# Patient Record
Sex: MALE | Race: BLACK OR AFRICAN AMERICAN | Employment: UNEMPLOYED | URBAN - METROPOLITAN AREA
[De-identification: names, ages, dates, MRNs, and addresses within clinical notes are randomized per-mention and may not be internally consistent; named-entity substitution may affect disease eponyms.]

---

## 2017-01-01 ENCOUNTER — APPOINTMENT (OUTPATIENT)
Dept: GENERAL RADIOLOGY | Age: 62
DRG: 871 | End: 2017-01-01
Attending: HOSPITALIST
Payer: MEDICARE

## 2017-01-01 ENCOUNTER — PATIENT OUTREACH (OUTPATIENT)
Dept: CASE MANAGEMENT | Age: 62
End: 2017-01-01

## 2017-01-01 ENCOUNTER — HOSPITAL ENCOUNTER (INPATIENT)
Age: 62
LOS: 5 days | Discharge: HOME HEALTH | DRG: 871 | End: 2017-07-02
Attending: EMERGENCY MEDICINE | Admitting: INTERNAL MEDICINE
Payer: MEDICARE

## 2017-01-01 ENCOUNTER — APPOINTMENT (OUTPATIENT)
Dept: CT IMAGING | Age: 62
DRG: 872 | End: 2017-01-01
Payer: MEDICARE

## 2017-01-01 ENCOUNTER — APPOINTMENT (OUTPATIENT)
Dept: GENERAL RADIOLOGY | Age: 62
DRG: 872 | End: 2017-01-01
Attending: EMERGENCY MEDICINE
Payer: MEDICARE

## 2017-01-01 ENCOUNTER — APPOINTMENT (OUTPATIENT)
Dept: CT IMAGING | Age: 62
DRG: 394 | End: 2017-01-01
Attending: EMERGENCY MEDICINE
Payer: MEDICARE

## 2017-01-01 ENCOUNTER — APPOINTMENT (OUTPATIENT)
Dept: MRI IMAGING | Age: 62
DRG: 394 | End: 2017-01-01
Payer: MEDICARE

## 2017-01-01 ENCOUNTER — HOSPICE ADMISSION (OUTPATIENT)
Dept: HOSPICE | Facility: HOSPICE | Age: 62
End: 2017-01-01
Payer: MEDICARE

## 2017-01-01 ENCOUNTER — APPOINTMENT (OUTPATIENT)
Dept: GENERAL RADIOLOGY | Age: 62
DRG: 871 | End: 2017-01-01
Attending: PHYSICIAN ASSISTANT
Payer: MEDICARE

## 2017-01-01 ENCOUNTER — APPOINTMENT (OUTPATIENT)
Dept: GENERAL RADIOLOGY | Age: 62
DRG: 871 | End: 2017-01-01
Attending: EMERGENCY MEDICINE
Payer: MEDICARE

## 2017-01-01 ENCOUNTER — APPOINTMENT (OUTPATIENT)
Dept: CT IMAGING | Age: 62
DRG: 871 | End: 2017-01-01
Attending: INTERNAL MEDICINE
Payer: MEDICARE

## 2017-01-01 ENCOUNTER — HOSPITAL ENCOUNTER (INPATIENT)
Age: 62
LOS: 5 days | End: 2017-07-30
Attending: INTERNAL MEDICINE | Admitting: INTERNAL MEDICINE

## 2017-01-01 ENCOUNTER — HOSPITAL ENCOUNTER (OUTPATIENT)
Dept: LAB | Age: 62
Discharge: HOME | End: 2017-06-17

## 2017-01-01 ENCOUNTER — APPOINTMENT (OUTPATIENT)
Dept: GENERAL RADIOLOGY | Age: 62
DRG: 871 | End: 2017-01-01
Attending: INTERNAL MEDICINE
Payer: MEDICARE

## 2017-01-01 ENCOUNTER — HOSPITAL ENCOUNTER (EMERGENCY)
Age: 62
Discharge: HOME | End: 2017-07-02
Attending: EMERGENCY MEDICINE
Payer: MEDICARE

## 2017-01-01 ENCOUNTER — HOSPITAL ENCOUNTER (OUTPATIENT)
Dept: LAB | Age: 62
Discharge: HOME | End: 2017-06-23

## 2017-01-01 ENCOUNTER — HOSPITAL ENCOUNTER (INPATIENT)
Age: 62
LOS: 4 days | Discharge: HOSPICE | DRG: 872 | End: 2017-07-25
Attending: EMERGENCY MEDICINE | Admitting: INTERNAL MEDICINE
Payer: MEDICARE

## 2017-01-01 ENCOUNTER — APPOINTMENT (OUTPATIENT)
Dept: ULTRASOUND IMAGING | Age: 62
DRG: 871 | End: 2017-01-01
Attending: HOSPITALIST
Payer: MEDICARE

## 2017-01-01 ENCOUNTER — HOSPITAL ENCOUNTER (INPATIENT)
Age: 62
LOS: 3 days | Discharge: NURSING, REHAB OR LONG-TERM CARE | DRG: 394 | End: 2017-06-16
Attending: EMERGENCY MEDICINE | Admitting: INTERNAL MEDICINE
Payer: MEDICARE

## 2017-01-01 ENCOUNTER — APPOINTMENT (OUTPATIENT)
Dept: ULTRASOUND IMAGING | Age: 62
DRG: 872 | End: 2017-01-01
Payer: MEDICARE

## 2017-01-01 VITALS
DIASTOLIC BLOOD PRESSURE: 62 MMHG | WEIGHT: 107 LBS | OXYGEN SATURATION: 94 % | HEIGHT: 60 IN | RESPIRATION RATE: 18 BRPM | SYSTOLIC BLOOD PRESSURE: 117 MMHG | HEART RATE: 89 BPM | TEMPERATURE: 99.7 F | BODY MASS INDEX: 21.01 KG/M2

## 2017-01-01 VITALS
DIASTOLIC BLOOD PRESSURE: 67 MMHG | HEIGHT: 60 IN | OXYGEN SATURATION: 98 % | HEART RATE: 80 BPM | WEIGHT: 107 LBS | SYSTOLIC BLOOD PRESSURE: 128 MMHG | BODY MASS INDEX: 21.01 KG/M2 | TEMPERATURE: 99.5 F | RESPIRATION RATE: 16 BRPM

## 2017-01-01 VITALS
OXYGEN SATURATION: 97 % | DIASTOLIC BLOOD PRESSURE: 63 MMHG | RESPIRATION RATE: 18 BRPM | HEIGHT: 68 IN | HEART RATE: 87 BPM | BODY MASS INDEX: 18.94 KG/M2 | SYSTOLIC BLOOD PRESSURE: 103 MMHG | TEMPERATURE: 98 F | WEIGHT: 125 LBS

## 2017-01-01 VITALS
TEMPERATURE: 101 F | OXYGEN SATURATION: 96 % | WEIGHT: 103.1 LBS | HEART RATE: 94 BPM | SYSTOLIC BLOOD PRESSURE: 116 MMHG | BODY MASS INDEX: 43.12 KG/M2 | RESPIRATION RATE: 19 BRPM | DIASTOLIC BLOOD PRESSURE: 57 MMHG

## 2017-01-01 VITALS
RESPIRATION RATE: 16 BRPM | HEART RATE: 93 BPM | DIASTOLIC BLOOD PRESSURE: 52 MMHG | SYSTOLIC BLOOD PRESSURE: 78 MMHG | TEMPERATURE: 96 F

## 2017-01-01 DIAGNOSIS — G93.41 ACUTE METABOLIC ENCEPHALOPATHY: ICD-10-CM

## 2017-01-01 DIAGNOSIS — J18.9 HOSPITAL-ACQUIRED PNEUMONIA: Primary | ICD-10-CM

## 2017-01-01 DIAGNOSIS — I50.22 CHRONIC SYSTOLIC CONGESTIVE HEART FAILURE (HCC): ICD-10-CM

## 2017-01-01 DIAGNOSIS — Z97.8 COMPLAINT ABOUT URINARY CATHETER: Primary | ICD-10-CM

## 2017-01-01 DIAGNOSIS — R53.81 DEBILITY: ICD-10-CM

## 2017-01-01 DIAGNOSIS — I95.9 HYPOTENSION, UNSPECIFIED HYPOTENSION TYPE: ICD-10-CM

## 2017-01-01 DIAGNOSIS — R91.8 PULMONARY INFILTRATES: ICD-10-CM

## 2017-01-01 DIAGNOSIS — F10.10 ETOH ABUSE: Chronic | ICD-10-CM

## 2017-01-01 DIAGNOSIS — F03.90 DEMENTIA WITHOUT BEHAVIORAL DISTURBANCE, UNSPECIFIED DEMENTIA TYPE: Chronic | ICD-10-CM

## 2017-01-01 DIAGNOSIS — Z89.612 S/P AKA (ABOVE KNEE AMPUTATION) BILATERAL (HCC): Chronic | ICD-10-CM

## 2017-01-01 DIAGNOSIS — K61.1 PERIRECTAL ABSCESS: ICD-10-CM
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
## 2017-01-01 DIAGNOSIS — R09.02 HYPOXEMIA: ICD-10-CM

## 2017-01-01 DIAGNOSIS — R39.9 COMPLAINT ABOUT URINARY CATHETER: Primary | ICD-10-CM

## 2017-01-01 DIAGNOSIS — J90 PLEURAL EFFUSION, BILATERAL: ICD-10-CM

## 2017-01-01 DIAGNOSIS — I10 ESSENTIAL HYPERTENSION: Chronic | ICD-10-CM

## 2017-01-01 DIAGNOSIS — K61.1 RECTAL ABSCESS: Primary | ICD-10-CM

## 2017-01-01 DIAGNOSIS — E16.2 HYPOGLYCEMIA: ICD-10-CM

## 2017-01-01 DIAGNOSIS — D64.9 ANEMIA, UNSPECIFIED TYPE: ICD-10-CM

## 2017-01-01 DIAGNOSIS — R79.89 ELEVATED BRAIN NATRIURETIC PEPTIDE (BNP) LEVEL: ICD-10-CM

## 2017-01-01 DIAGNOSIS — N17.9 ACUTE RENAL FAILURE, UNSPECIFIED ACUTE RENAL FAILURE TYPE (HCC): ICD-10-CM

## 2017-01-01 DIAGNOSIS — N17.9 AKI (ACUTE KIDNEY INJURY) (HCC): ICD-10-CM

## 2017-01-01 DIAGNOSIS — N30.01 ACUTE CYSTITIS WITH HEMATURIA: Primary | ICD-10-CM

## 2017-01-01 DIAGNOSIS — Z89.611 S/P AKA (ABOVE KNEE AMPUTATION) BILATERAL (HCC): Chronic | ICD-10-CM

## 2017-01-01 LAB
ABO + RH BLD: NORMAL
ALBUMIN SERPL BCP-MCNC: 1.4 G/DL (ref 3.2–4.6)
ALBUMIN SERPL BCP-MCNC: 1.5 G/DL (ref 3.2–4.6)
ALBUMIN SERPL BCP-MCNC: 1.8 G/DL (ref 3.2–4.6)
ALBUMIN SERPL BCP-MCNC: 2 G/DL (ref 3.2–4.6)
ALBUMIN SERPL BCP-MCNC: 2.1 G/DL (ref 3.2–4.6)
ALBUMIN SERPL BCP-MCNC: 2.4 G/DL (ref 3.2–4.6)
ALBUMIN SERPL BCP-MCNC: 2.6 G/DL (ref 3.2–4.6)
ALBUMIN/GLOB SERPL: 0.3 {RATIO} (ref 1.2–3.5)
ALBUMIN/GLOB SERPL: 0.4 {RATIO} (ref 1.2–3.5)
ALBUMIN/GLOB SERPL: 0.5 {RATIO} (ref 1.2–3.5)
ALBUMIN/GLOB SERPL: 0.5 {RATIO} (ref 1.2–3.5)
ALP SERPL-CCNC: 106 U/L (ref 50–136)
ALP SERPL-CCNC: 115 U/L (ref 50–136)
ALP SERPL-CCNC: 117 U/L (ref 50–136)
ALP SERPL-CCNC: 120 U/L (ref 50–136)
ALP SERPL-CCNC: 149 U/L (ref 50–136)
ALP SERPL-CCNC: 181 U/L (ref 50–136)
ALP SERPL-CCNC: 94 U/L (ref 50–136)
ALT SERPL-CCNC: 10 U/L (ref 12–65)
ALT SERPL-CCNC: 110 U/L (ref 12–65)
ALT SERPL-CCNC: 12 U/L (ref 12–65)
ALT SERPL-CCNC: 15 U/L (ref 12–65)
ALT SERPL-CCNC: 183 U/L (ref 12–65)
AMMONIA PLAS-SCNC: 22 UMOL/L (ref 11–32)
AMPHET UR QL SCN: NEGATIVE
ANION GAP BLD CALC-SCNC: 10 MMOL/L (ref 7–16)
ANION GAP BLD CALC-SCNC: 11 MMOL/L (ref 7–16)
ANION GAP BLD CALC-SCNC: 12 MMOL/L (ref 7–16)
ANION GAP BLD CALC-SCNC: 13 MMOL/L (ref 7–16)
ANION GAP BLD CALC-SCNC: 6 MMOL/L (ref 7–16)
ANION GAP BLD CALC-SCNC: 7 MMOL/L (ref 7–16)
ANION GAP BLD CALC-SCNC: 7 MMOL/L (ref 7–16)
ANION GAP BLD CALC-SCNC: 8 MMOL/L (ref 7–16)
ANION GAP BLD CALC-SCNC: 9 MMOL/L (ref 7–16)
ANION GAP BLD CALC-SCNC: 9 MMOL/L (ref 7–16)
APPEARANCE UR: ABNORMAL
APPEARANCE UR: ABNORMAL
APPEARANCE UR: CLEAR
ARTERIAL PATENCY WRIST A: POSITIVE
AST SERPL W P-5'-P-CCNC: 153 U/L (ref 15–37)
AST SERPL W P-5'-P-CCNC: 19 U/L (ref 15–37)
AST SERPL W P-5'-P-CCNC: 23 U/L (ref 15–37)
AST SERPL W P-5'-P-CCNC: 24 U/L (ref 15–37)
AST SERPL W P-5'-P-CCNC: 50 U/L (ref 15–37)
AST SERPL W P-5'-P-CCNC: 79 U/L (ref 15–37)
AST SERPL W P-5'-P-CCNC: 9 U/L (ref 15–37)
ATRIAL RATE: 86 BPM
BACTERIA SPEC CULT: ABNORMAL
BACTERIA SPEC CULT: NORMAL
BACTERIA URNS QL MICRO: ABNORMAL /HPF
BARBITURATES UR QL SCN: NEGATIVE
BASE DEFICIT BLDA-SCNC: 6.7 MMOL/L (ref 0–2)
BASOPHILS # BLD AUTO: 0 K/UL (ref 0–0.2)
BASOPHILS # BLD: 0 % (ref 0–2)
BASOPHILS # BLD: 1 % (ref 0–2)
BDY SITE: ABNORMAL
BENZODIAZ UR QL: NEGATIVE
BILIRUB DIRECT SERPL-MCNC: <0.1 MG/DL
BILIRUB SERPL-MCNC: 0.1 MG/DL (ref 0.2–1.1)
BILIRUB SERPL-MCNC: 0.2 MG/DL (ref 0.2–1.1)
BILIRUB SERPL-MCNC: 0.3 MG/DL (ref 0.2–1.1)
BILIRUB SERPL-MCNC: 0.3 MG/DL (ref 0.2–1.1)
BILIRUB SERPL-MCNC: 0.4 MG/DL (ref 0.2–1.1)
BILIRUB UR QL: ABNORMAL
BILIRUB UR QL: NEGATIVE
BILIRUB UR QL: NEGATIVE
BLD PROD TYP BPU: NORMAL
BLD PROD TYP BPU: NORMAL
BLOOD GROUP ANTIBODIES SERPL: NORMAL
BNP SERPL-MCNC: 1125 PG/ML
BNP SERPL-MCNC: 2285 PG/ML
BNP SERPL-MCNC: 884 PG/ML
BPU ID: NORMAL
BPU ID: NORMAL
BUN SERPL-MCNC: 10 MG/DL (ref 8–23)
BUN SERPL-MCNC: 17 MG/DL (ref 8–23)
BUN SERPL-MCNC: 19 MG/DL (ref 8–23)
BUN SERPL-MCNC: 20 MG/DL (ref 8–23)
BUN SERPL-MCNC: 21 MG/DL (ref 8–23)
BUN SERPL-MCNC: 23 MG/DL (ref 8–23)
BUN SERPL-MCNC: 23 MG/DL (ref 8–23)
BUN SERPL-MCNC: 25 MG/DL (ref 8–23)
BUN SERPL-MCNC: 27 MG/DL (ref 8–23)
BUN SERPL-MCNC: 31 MG/DL (ref 8–23)
BUN SERPL-MCNC: 33 MG/DL (ref 8–23)
BUN SERPL-MCNC: 37 MG/DL (ref 8–23)
BUN SERPL-MCNC: 39 MG/DL (ref 8–23)
BUN SERPL-MCNC: 45 MG/DL (ref 8–23)
BUN SERPL-MCNC: 51 MG/DL (ref 8–23)
BUN SERPL-MCNC: 56 MG/DL (ref 8–23)
BUN SERPL-MCNC: 58 MG/DL (ref 8–23)
BUN SERPL-MCNC: 60 MG/DL (ref 8–23)
C TRACH RRNA SPEC QL NAA+PROBE: NEGATIVE
CA-I BLD-SCNC: 1.11 MMOL/L (ref 1–1.3)
CALCIUM SERPL-MCNC: 7.2 MG/DL (ref 8.3–10.4)
CALCIUM SERPL-MCNC: 7.6 MG/DL (ref 8.3–10.4)
CALCIUM SERPL-MCNC: 7.7 MG/DL (ref 8.3–10.4)
CALCIUM SERPL-MCNC: 7.8 MG/DL (ref 8.3–10.4)
CALCIUM SERPL-MCNC: 7.9 MG/DL (ref 8.3–10.4)
CALCIUM SERPL-MCNC: 7.9 MG/DL (ref 8.3–10.4)
CALCIUM SERPL-MCNC: 8 MG/DL (ref 8.3–10.4)
CALCIUM SERPL-MCNC: 8.1 MG/DL (ref 8.3–10.4)
CALCIUM SERPL-MCNC: 8.1 MG/DL (ref 8.3–10.4)
CALCIUM SERPL-MCNC: 8.2 MG/DL (ref 8.3–10.4)
CALCIUM SERPL-MCNC: 8.5 MG/DL (ref 8.3–10.4)
CALCIUM SERPL-MCNC: 8.6 MG/DL (ref 8.3–10.4)
CALCIUM SERPL-MCNC: 8.7 MG/DL (ref 8.3–10.4)
CALCIUM SERPL-MCNC: 9 MG/DL (ref 8.3–10.4)
CALCIUM SERPL-MCNC: 9 MG/DL (ref 8.3–10.4)
CALCIUM SERPL-MCNC: 9.2 MG/DL (ref 8.3–10.4)
CALCULATED P AXIS, ECG09: 48 DEGREES
CALCULATED R AXIS, ECG10: 12 DEGREES
CALCULATED T AXIS, ECG11: -77 DEGREES
CANNABINOIDS UR QL SCN: NEGATIVE
CASTS URNS QL MICRO: 0 /LPF
CASTS URNS QL MICRO: 0 /LPF
CHLORIDE BLDA-SCNC: 99 MMOL/L (ref 98–106)
CHLORIDE SERPL-SCNC: 100 MMOL/L (ref 98–107)
CHLORIDE SERPL-SCNC: 102 MMOL/L (ref 98–107)
CHLORIDE SERPL-SCNC: 102 MMOL/L (ref 98–107)
CHLORIDE SERPL-SCNC: 103 MMOL/L (ref 98–107)
CHLORIDE SERPL-SCNC: 90 MMOL/L (ref 98–107)
CHLORIDE SERPL-SCNC: 94 MMOL/L (ref 98–107)
CHLORIDE SERPL-SCNC: 95 MMOL/L (ref 98–107)
CHLORIDE SERPL-SCNC: 95 MMOL/L (ref 98–107)
CHLORIDE SERPL-SCNC: 96 MMOL/L (ref 98–107)
CHLORIDE SERPL-SCNC: 98 MMOL/L (ref 98–107)
CHLORIDE SERPL-SCNC: 99 MMOL/L (ref 98–107)
CO2 SERPL-SCNC: 19 MMOL/L (ref 21–32)
CO2 SERPL-SCNC: 19 MMOL/L (ref 21–32)
CO2 SERPL-SCNC: 21 MMOL/L (ref 21–32)
CO2 SERPL-SCNC: 22 MMOL/L (ref 21–32)
CO2 SERPL-SCNC: 23 MMOL/L (ref 21–32)
CO2 SERPL-SCNC: 23 MMOL/L (ref 21–32)
CO2 SERPL-SCNC: 24 MMOL/L (ref 21–32)
CO2 SERPL-SCNC: 25 MMOL/L (ref 21–32)
CO2 SERPL-SCNC: 25 MMOL/L (ref 21–32)
CO2 SERPL-SCNC: 26 MMOL/L (ref 21–32)
CO2 SERPL-SCNC: 27 MMOL/L (ref 21–32)
CO2 SERPL-SCNC: 28 MMOL/L (ref 21–32)
CO2 SERPL-SCNC: 30 MMOL/L (ref 21–32)
CO2 SERPL-SCNC: 32 MMOL/L (ref 21–32)
CO2 SERPL-SCNC: 34 MMOL/L (ref 21–32)
COCAINE UR QL SCN: NEGATIVE
COHGB MFR BLD: 0.8 % (ref 0.5–1.5)
COLOR UR: YELLOW
CREAT SERPL-MCNC: 0.55 MG/DL (ref 0.8–1.5)
CREAT SERPL-MCNC: 0.62 MG/DL (ref 0.8–1.5)
CREAT SERPL-MCNC: 0.63 MG/DL (ref 0.8–1.5)
CREAT SERPL-MCNC: 0.65 MG/DL (ref 0.8–1.5)
CREAT SERPL-MCNC: 0.66 MG/DL (ref 0.8–1.5)
CREAT SERPL-MCNC: 0.66 MG/DL (ref 0.8–1.5)
CREAT SERPL-MCNC: 0.71 MG/DL (ref 0.8–1.5)
CREAT SERPL-MCNC: 0.72 MG/DL (ref 0.8–1.5)
CREAT SERPL-MCNC: 0.8 MG/DL (ref 0.8–1.5)
CREAT SERPL-MCNC: 0.81 MG/DL (ref 0.8–1.5)
CREAT SERPL-MCNC: 0.87 MG/DL (ref 0.8–1.5)
CREAT SERPL-MCNC: 0.92 MG/DL (ref 0.8–1.5)
CREAT SERPL-MCNC: 0.96 MG/DL (ref 0.8–1.5)
CREAT SERPL-MCNC: 0.98 MG/DL (ref 0.8–1.5)
CREAT SERPL-MCNC: 1.01 MG/DL (ref 0.8–1.5)
CREAT SERPL-MCNC: 1.19 MG/DL (ref 0.8–1.5)
CREAT SERPL-MCNC: 1.19 MG/DL (ref 0.8–1.5)
CREAT SERPL-MCNC: 1.2 MG/DL (ref 0.8–1.5)
CROSSMATCH RESULT,%XM: NORMAL
CROSSMATCH RESULT,%XM: NORMAL
CRYSTALS URNS QL MICRO: 0 /LPF
CRYSTALS URNS QL MICRO: 0 /LPF
DIAGNOSIS, 93000: NORMAL
DIFFERENTIAL METHOD BLD: ABNORMAL
DO-HGB BLD-MCNC: 7 % (ref 0–5)
EOSINOPHIL # BLD: 0 K/UL (ref 0–0.8)
EOSINOPHIL # BLD: 0.1 K/UL (ref 0–0.8)
EOSINOPHIL # BLD: 0.1 K/UL (ref 0–0.8)
EOSINOPHIL # BLD: 0.2 K/UL (ref 0–0.8)
EOSINOPHIL NFR BLD: 0 % (ref 0.5–7.8)
EOSINOPHIL NFR BLD: 0 % (ref 0.5–7.8)
EOSINOPHIL NFR BLD: 1 % (ref 0.5–7.8)
EOSINOPHIL NFR BLD: 2 % (ref 0.5–7.8)
EOSINOPHIL NFR BLD: 3 % (ref 0.5–7.8)
EPI CELLS #/AREA URNS HPF: 0 /HPF
EPI CELLS #/AREA URNS HPF: ABNORMAL /HPF
ERYTHROCYTE [DISTWIDTH] IN BLOOD BY AUTOMATED COUNT: 18 % (ref 11.9–14.6)
ERYTHROCYTE [DISTWIDTH] IN BLOOD BY AUTOMATED COUNT: 18.1 % (ref 11.9–14.6)
ERYTHROCYTE [DISTWIDTH] IN BLOOD BY AUTOMATED COUNT: 18.1 % (ref 11.9–14.6)
ERYTHROCYTE [DISTWIDTH] IN BLOOD BY AUTOMATED COUNT: 18.5 % (ref 11.9–14.6)
ERYTHROCYTE [DISTWIDTH] IN BLOOD BY AUTOMATED COUNT: 18.5 % (ref 11.9–14.6)
ERYTHROCYTE [DISTWIDTH] IN BLOOD BY AUTOMATED COUNT: 18.6 % (ref 11.9–14.6)
ERYTHROCYTE [DISTWIDTH] IN BLOOD BY AUTOMATED COUNT: 18.7 % (ref 11.9–14.6)
ERYTHROCYTE [DISTWIDTH] IN BLOOD BY AUTOMATED COUNT: 18.8 % (ref 11.9–14.6)
ERYTHROCYTE [DISTWIDTH] IN BLOOD BY AUTOMATED COUNT: 19.7 % (ref 11.9–14.6)
ERYTHROCYTE [DISTWIDTH] IN BLOOD BY AUTOMATED COUNT: 20.5 % (ref 11.9–14.6)
ERYTHROCYTE [DISTWIDTH] IN BLOOD BY AUTOMATED COUNT: 21.2 % (ref 11.9–14.6)
ERYTHROCYTE [DISTWIDTH] IN BLOOD BY AUTOMATED COUNT: 21.4 % (ref 11.9–14.6)
EST. AVERAGE GLUCOSE BLD GHB EST-MCNC: 289 MG/DL
EST. AVERAGE GLUCOSE BLD GHB EST-MCNC: 292 MG/DL
GAS FLOW.O2 O2 DELIVERY SYS: 4 L/MIN
GLOBULIN SER CALC-MCNC: 4.9 G/DL (ref 2.3–3.5)
GLOBULIN SER CALC-MCNC: 5 G/DL (ref 2.3–3.5)
GLOBULIN SER CALC-MCNC: 5.4 G/DL (ref 2.3–3.5)
GLOBULIN SER CALC-MCNC: 5.5 G/DL (ref 2.3–3.5)
GLOBULIN SER CALC-MCNC: 5.5 G/DL (ref 2.3–3.5)
GLOBULIN SER CALC-MCNC: 6.1 G/DL (ref 2.3–3.5)
GLOBULIN SER CALC-MCNC: 6.2 G/DL (ref 2.3–3.5)
GLUCOSE BLD STRIP.AUTO-MCNC: 100 MG/DL (ref 65–100)
GLUCOSE BLD STRIP.AUTO-MCNC: 101 MG/DL (ref 65–100)
GLUCOSE BLD STRIP.AUTO-MCNC: 104 MG/DL (ref 65–100)
GLUCOSE BLD STRIP.AUTO-MCNC: 107 MG/DL (ref 65–100)
GLUCOSE BLD STRIP.AUTO-MCNC: 107 MG/DL (ref 65–100)
GLUCOSE BLD STRIP.AUTO-MCNC: 108 MG/DL (ref 65–100)
GLUCOSE BLD STRIP.AUTO-MCNC: 109 MG/DL (ref 65–100)
GLUCOSE BLD STRIP.AUTO-MCNC: 109 MG/DL (ref 65–100)
GLUCOSE BLD STRIP.AUTO-MCNC: 119 MG/DL (ref 65–100)
GLUCOSE BLD STRIP.AUTO-MCNC: 120 MG/DL (ref 65–100)
GLUCOSE BLD STRIP.AUTO-MCNC: 128 MG/DL (ref 65–100)
GLUCOSE BLD STRIP.AUTO-MCNC: 130 MG/DL (ref 65–100)
GLUCOSE BLD STRIP.AUTO-MCNC: 135 MG/DL (ref 65–100)
GLUCOSE BLD STRIP.AUTO-MCNC: 135 MG/DL (ref 65–100)
GLUCOSE BLD STRIP.AUTO-MCNC: 137 MG/DL (ref 65–100)
GLUCOSE BLD STRIP.AUTO-MCNC: 141 MG/DL (ref 65–100)
GLUCOSE BLD STRIP.AUTO-MCNC: 146 MG/DL (ref 65–100)
GLUCOSE BLD STRIP.AUTO-MCNC: 148 MG/DL (ref 65–100)
GLUCOSE BLD STRIP.AUTO-MCNC: 163 MG/DL (ref 65–100)
GLUCOSE BLD STRIP.AUTO-MCNC: 172 MG/DL (ref 65–100)
GLUCOSE BLD STRIP.AUTO-MCNC: 173 MG/DL (ref 65–100)
GLUCOSE BLD STRIP.AUTO-MCNC: 175 MG/DL (ref 65–100)
GLUCOSE BLD STRIP.AUTO-MCNC: 184 MG/DL (ref 65–100)
GLUCOSE BLD STRIP.AUTO-MCNC: 185 MG/DL (ref 65–100)
GLUCOSE BLD STRIP.AUTO-MCNC: 189 MG/DL (ref 65–100)
GLUCOSE BLD STRIP.AUTO-MCNC: 19 MG/DL (ref 65–100)
GLUCOSE BLD STRIP.AUTO-MCNC: 196 MG/DL (ref 65–100)
GLUCOSE BLD STRIP.AUTO-MCNC: 199 MG/DL (ref 65–100)
GLUCOSE BLD STRIP.AUTO-MCNC: 208 MG/DL (ref 65–100)
GLUCOSE BLD STRIP.AUTO-MCNC: 210 MG/DL (ref 65–100)
GLUCOSE BLD STRIP.AUTO-MCNC: 213 MG/DL (ref 65–100)
GLUCOSE BLD STRIP.AUTO-MCNC: 219 MG/DL (ref 65–100)
GLUCOSE BLD STRIP.AUTO-MCNC: 221 MG/DL (ref 65–100)
GLUCOSE BLD STRIP.AUTO-MCNC: 224 MG/DL (ref 65–100)
GLUCOSE BLD STRIP.AUTO-MCNC: 245 MG/DL (ref 65–100)
GLUCOSE BLD STRIP.AUTO-MCNC: 247 MG/DL (ref 65–100)
GLUCOSE BLD STRIP.AUTO-MCNC: 250 MG/DL (ref 65–100)
GLUCOSE BLD STRIP.AUTO-MCNC: 254 MG/DL (ref 65–100)
GLUCOSE BLD STRIP.AUTO-MCNC: 260 MG/DL (ref 65–100)
GLUCOSE BLD STRIP.AUTO-MCNC: 265 MG/DL (ref 65–100)
GLUCOSE BLD STRIP.AUTO-MCNC: 269 MG/DL (ref 65–100)
GLUCOSE BLD STRIP.AUTO-MCNC: 27 MG/DL (ref 65–100)
GLUCOSE BLD STRIP.AUTO-MCNC: 276 MG/DL (ref 65–100)
GLUCOSE BLD STRIP.AUTO-MCNC: 311 MG/DL (ref 65–100)
GLUCOSE BLD STRIP.AUTO-MCNC: 322 MG/DL (ref 65–100)
GLUCOSE BLD STRIP.AUTO-MCNC: 324 MG/DL (ref 65–100)
GLUCOSE BLD STRIP.AUTO-MCNC: 324 MG/DL (ref 65–100)
GLUCOSE BLD STRIP.AUTO-MCNC: 334 MG/DL (ref 65–100)
GLUCOSE BLD STRIP.AUTO-MCNC: 345 MG/DL (ref 65–100)
GLUCOSE BLD STRIP.AUTO-MCNC: 359 MG/DL (ref 65–100)
GLUCOSE BLD STRIP.AUTO-MCNC: 374 MG/DL (ref 65–100)
GLUCOSE BLD STRIP.AUTO-MCNC: 384 MG/DL (ref 65–100)
GLUCOSE BLD STRIP.AUTO-MCNC: 393 MG/DL (ref 65–100)
GLUCOSE BLD STRIP.AUTO-MCNC: 45 MG/DL (ref 65–100)
GLUCOSE BLD STRIP.AUTO-MCNC: 46 MG/DL (ref 65–100)
GLUCOSE BLD STRIP.AUTO-MCNC: 65 MG/DL (ref 65–100)
GLUCOSE BLD STRIP.AUTO-MCNC: 79 MG/DL (ref 65–100)
GLUCOSE BLD STRIP.AUTO-MCNC: 79 MG/DL (ref 65–100)
GLUCOSE BLD STRIP.AUTO-MCNC: 94 MG/DL (ref 65–100)
GLUCOSE BLD STRIP.AUTO-MCNC: 95 MG/DL (ref 65–100)
GLUCOSE BLD STRIP.AUTO-MCNC: 97 MG/DL (ref 65–100)
GLUCOSE BLD STRIP.AUTO-MCNC: 98 MG/DL (ref 65–100)
GLUCOSE BLD STRIP.AUTO-MCNC: >600 MG/DL (ref 65–100)
GLUCOSE SERPL-MCNC: 103 MG/DL (ref 65–100)
GLUCOSE SERPL-MCNC: 123 MG/DL (ref 65–100)
GLUCOSE SERPL-MCNC: 132 MG/DL (ref 65–100)
GLUCOSE SERPL-MCNC: 146 MG/DL (ref 65–100)
GLUCOSE SERPL-MCNC: 154 MG/DL (ref 65–100)
GLUCOSE SERPL-MCNC: 173 MG/DL (ref 65–100)
GLUCOSE SERPL-MCNC: 195 MG/DL (ref 65–100)
GLUCOSE SERPL-MCNC: 241 MG/DL (ref 65–100)
GLUCOSE SERPL-MCNC: 244 MG/DL (ref 65–100)
GLUCOSE SERPL-MCNC: 336 MG/DL (ref 65–100)
GLUCOSE SERPL-MCNC: 336 MG/DL (ref 65–100)
GLUCOSE SERPL-MCNC: 359 MG/DL (ref 65–100)
GLUCOSE SERPL-MCNC: 38 MG/DL (ref 65–100)
GLUCOSE SERPL-MCNC: 45 MG/DL (ref 65–100)
GLUCOSE SERPL-MCNC: 596 MG/DL (ref 65–100)
GLUCOSE SERPL-MCNC: 644 MG/DL (ref 65–100)
GLUCOSE SERPL-MCNC: 76 MG/DL (ref 65–100)
GLUCOSE SERPL-MCNC: 96 MG/DL (ref 65–100)
GLUCOSE SERPL-MCNC: 97 MG/DL (ref 65–100)
GLUCOSE UR STRIP.AUTO-MCNC: >1000 MG/DL
GLUCOSE UR STRIP.AUTO-MCNC: >1000 MG/DL
GLUCOSE UR STRIP.AUTO-MCNC: NEGATIVE MG/DL
GRAM STN SPEC: ABNORMAL
HAV IGM SERPL QL IA: NEGATIVE
HBA1C MFR BLD: 11.7 % (ref 4.8–6)
HBA1C MFR BLD: 11.8 % (ref 4.8–6)
HBV CORE IGM SERPL QL IA: NEGATIVE
HBV SURFACE AG SERPL QL IA: NEGATIVE
HCO3 BLDA-SCNC: 18 MMOL/L (ref 22–26)
HCT VFR BLD AUTO: 17.1 % (ref 41.1–50.3)
HCT VFR BLD AUTO: 21.2 % (ref 41.1–50.3)
HCT VFR BLD AUTO: 22.6 % (ref 41.1–50.3)
HCT VFR BLD AUTO: 25.3 % (ref 41.1–50.3)
HCT VFR BLD AUTO: 25.3 % (ref 41.1–50.3)
HCT VFR BLD AUTO: 25.5 % (ref 41.1–50.3)
HCT VFR BLD AUTO: 25.5 % (ref 41.1–50.3)
HCT VFR BLD AUTO: 26.4 % (ref 41.1–50.3)
HCT VFR BLD AUTO: 26.4 % (ref 41.1–50.3)
HCT VFR BLD AUTO: 26.6 % (ref 41.1–50.3)
HCT VFR BLD AUTO: 27.1 % (ref 41.1–50.3)
HCT VFR BLD AUTO: 27.2 % (ref 41.1–50.3)
HCT VFR BLD AUTO: 27.4 % (ref 41.1–50.3)
HCT VFR BLD AUTO: 27.7 % (ref 41.1–50.3)
HCT VFR BLD AUTO: 28.1 % (ref 41.1–50.3)
HCT VFR BLD AUTO: 28.9 % (ref 41.1–50.3)
HCT VFR BLD AUTO: 29.9 % (ref 41.1–50.3)
HCT VFR BLD AUTO: 30.5 % (ref 41.1–50.3)
HCT VFR BLD AUTO: 32.1 % (ref 41.1–50.3)
HCT VFR BLD AUTO: 32.4 % (ref 41.1–50.3)
HCT VFR BLD AUTO: 33.4 % (ref 41.1–50.3)
HCT VFR BLD AUTO: 33.9 % (ref 41.1–50.3)
HCV AB S/CO SERPL IA: <0.1 S/CO RATIO (ref 0–0.9)
HEMOCCULT STL QL: POSITIVE
HGB BLD-MCNC: 10.9 G/DL (ref 13.6–17.2)
HGB BLD-MCNC: 11.1 G/DL (ref 13.6–17.2)
HGB BLD-MCNC: 11.1 G/DL (ref 13.6–17.2)
HGB BLD-MCNC: 11.6 G/DL (ref 13.6–17.2)
HGB BLD-MCNC: 5.7 G/DL (ref 13.6–17.2)
HGB BLD-MCNC: 7 G/DL (ref 13.6–17.2)
HGB BLD-MCNC: 7.6 G/DL (ref 13.6–17.2)
HGB BLD-MCNC: 8.6 G/DL (ref 13.6–17.2)
HGB BLD-MCNC: 8.7 G/DL (ref 13.6–17.2)
HGB BLD-MCNC: 8.8 G/DL (ref 13.6–17.2)
HGB BLD-MCNC: 8.9 G/DL (ref 13.6–17.2)
HGB BLD-MCNC: 9 G/DL (ref 13.6–17.2)
HGB BLD-MCNC: 9.1 G/DL (ref 13.6–17.2)
HGB BLD-MCNC: 9.1 G/DL (ref 13.6–17.2)
HGB BLD-MCNC: 9.2 G/DL (ref 13.6–17.2)
HGB BLD-MCNC: 9.3 G/DL (ref 13.6–17.2)
HGB BLD-MCNC: 9.5 G/DL (ref 13.6–17.2)
HGB BLD-MCNC: 9.8 G/DL (ref 13.6–17.2)
HGB BLD-MCNC: 9.9 G/DL (ref 13.6–17.2)
HGB BLD-MCNC: 9.9 G/DL (ref 13.6–17.2)
HGB BLDMV-MCNC: 6.4 GM/DL (ref 11.7–15)
HGB UR QL STRIP: ABNORMAL
HIV1 P24 AG SERPL QL IA: NONREACTIVE
HIV1+2 AB SERPL QL IA: NONREACTIVE
IMM GRANULOCYTES # BLD: 0 K/UL (ref 0–0.5)
IMM GRANULOCYTES # BLD: 0.1 K/UL (ref 0–0.5)
IMM GRANULOCYTES NFR BLD AUTO: 0.3 % (ref 0–5)
IMM GRANULOCYTES NFR BLD AUTO: 0.4 % (ref 0–5)
IMM GRANULOCYTES NFR BLD AUTO: 0.4 % (ref 0–5)
IMM GRANULOCYTES NFR BLD AUTO: 0.5 % (ref 0–5)
IMM GRANULOCYTES NFR BLD AUTO: 0.6 % (ref 0–5)
IMM GRANULOCYTES NFR BLD AUTO: 0.6 % (ref 0–5)
IMM GRANULOCYTES NFR BLD AUTO: 0.8 % (ref 0–5)
INR PPP: 1 (ref 0.9–1.2)
KETONES UR QL STRIP.AUTO: ABNORMAL MG/DL
KETONES UR QL STRIP.AUTO: NEGATIVE MG/DL
KETONES UR QL STRIP.AUTO: NEGATIVE MG/DL
LACOSAMIDE SERPL-MCNC: 11 UG/ML (ref 5–10)
LACOSAMIDE SERPL-MCNC: 9.6 UG/ML (ref 5–10)
LACTATE BLD-SCNC: 0.8 MMOL/L (ref 0.5–1.9)
LACTATE BLD-SCNC: 1.7 MMOL/L (ref 0.5–1.9)
LACTATE SERPL-SCNC: 1.4 MMOL/L (ref 0.4–2)
LACTATE SERPL-SCNC: 1.6 MMOL/L (ref 0.4–2)
LEUKOCYTE ESTERASE UR QL STRIP.AUTO: ABNORMAL
LEVETIRACETAM SERPL-MCNC: 42.3 UG/ML (ref 10–40)
LEVETIRACETAM SERPL-MCNC: 46.1 UG/ML (ref 10–40)
LEVETIRACETAM SERPL-MCNC: NORMAL UG/ML (ref 10–40)
LIPASE SERPL-CCNC: 164 U/L (ref 73–393)
LYMPHOCYTES # BLD AUTO: 13 % (ref 13–44)
LYMPHOCYTES # BLD AUTO: 14 % (ref 13–44)
LYMPHOCYTES # BLD AUTO: 16 % (ref 13–44)
LYMPHOCYTES # BLD AUTO: 17 % (ref 13–44)
LYMPHOCYTES # BLD AUTO: 19 % (ref 13–44)
LYMPHOCYTES # BLD AUTO: 22 % (ref 13–44)
LYMPHOCYTES # BLD AUTO: 23 % (ref 13–44)
LYMPHOCYTES # BLD AUTO: 38 % (ref 13–44)
LYMPHOCYTES # BLD AUTO: 46 % (ref 13–44)
LYMPHOCYTES # BLD AUTO: 50 % (ref 13–44)
LYMPHOCYTES # BLD: 1.5 K/UL (ref 0.5–4.6)
LYMPHOCYTES # BLD: 1.5 K/UL (ref 0.5–4.6)
LYMPHOCYTES # BLD: 2.1 K/UL (ref 0.5–4.6)
LYMPHOCYTES # BLD: 2.6 K/UL (ref 0.5–4.6)
LYMPHOCYTES # BLD: 2.7 K/UL (ref 0.5–4.6)
LYMPHOCYTES # BLD: 3.3 K/UL (ref 0.5–4.6)
LYMPHOCYTES # BLD: 3.5 K/UL (ref 0.5–4.6)
LYMPHOCYTES # BLD: 3.5 K/UL (ref 0.5–4.6)
LYMPHOCYTES # BLD: 5.1 K/UL (ref 0.5–4.6)
LYMPHOCYTES # BLD: 6 K/UL (ref 0.5–4.6)
MAGNESIUM SERPL-MCNC: 2 MG/DL (ref 1.8–2.4)
MAGNESIUM SERPL-MCNC: 2 MG/DL (ref 1.8–2.4)
MAGNESIUM SERPL-MCNC: 2.1 MG/DL (ref 1.8–2.4)
MAGNESIUM SERPL-MCNC: 2.1 MG/DL (ref 1.8–2.4)
MAGNESIUM SERPL-MCNC: 2.3 MG/DL (ref 1.8–2.4)
MCH RBC QN AUTO: 26.8 PG (ref 26.1–32.9)
MCH RBC QN AUTO: 26.9 PG (ref 26.1–32.9)
MCH RBC QN AUTO: 27 PG (ref 26.1–32.9)
MCH RBC QN AUTO: 27.1 PG (ref 26.1–32.9)
MCH RBC QN AUTO: 27.2 PG (ref 26.1–32.9)
MCH RBC QN AUTO: 27.3 PG (ref 26.1–32.9)
MCH RBC QN AUTO: 27.5 PG (ref 26.1–32.9)
MCH RBC QN AUTO: 27.6 PG (ref 26.1–32.9)
MCH RBC QN AUTO: 27.6 PG (ref 26.1–32.9)
MCH RBC QN AUTO: 28.3 PG (ref 26.1–32.9)
MCHC RBC AUTO-ENTMCNC: 32.5 G/DL (ref 31.4–35)
MCHC RBC AUTO-ENTMCNC: 32.8 G/DL (ref 31.4–35)
MCHC RBC AUTO-ENTMCNC: 33.2 G/DL (ref 31.4–35)
MCHC RBC AUTO-ENTMCNC: 33.3 G/DL (ref 31.4–35)
MCHC RBC AUTO-ENTMCNC: 33.5 G/DL (ref 31.4–35)
MCHC RBC AUTO-ENTMCNC: 33.6 G/DL (ref 31.4–35)
MCHC RBC AUTO-ENTMCNC: 34 G/DL (ref 31.4–35)
MCHC RBC AUTO-ENTMCNC: 34.2 G/DL (ref 31.4–35)
MCHC RBC AUTO-ENTMCNC: 34.3 G/DL (ref 31.4–35)
MCHC RBC AUTO-ENTMCNC: 34.4 G/DL (ref 31.4–35)
MCV RBC AUTO: 79.4 FL (ref 79.6–97.8)
MCV RBC AUTO: 79.8 FL (ref 79.6–97.8)
MCV RBC AUTO: 79.9 FL (ref 79.6–97.8)
MCV RBC AUTO: 81.3 FL (ref 79.6–97.8)
MCV RBC AUTO: 81.7 FL (ref 79.6–97.8)
MCV RBC AUTO: 82 FL (ref 79.6–97.8)
MCV RBC AUTO: 82.1 FL (ref 79.6–97.8)
MCV RBC AUTO: 82.2 FL (ref 79.6–97.8)
MCV RBC AUTO: 82.4 FL (ref 79.6–97.8)
MCV RBC AUTO: 82.7 FL (ref 79.6–97.8)
METHADONE UR QL: NEGATIVE
METHGB MFR BLD: 0.7 % (ref 0–1.5)
MM INDURATION POC: 0 MM (ref 0–5)
MM INDURATION POC: NORMAL 0 (ref 0–5)
MONOCYTES # BLD: 0 K/UL (ref 0.1–1.3)
MONOCYTES # BLD: 0.5 K/UL (ref 0.1–1.3)
MONOCYTES # BLD: 1 K/UL (ref 0.1–1.3)
MONOCYTES # BLD: 1 K/UL (ref 0.1–1.3)
MONOCYTES # BLD: 1.2 K/UL (ref 0.1–1.3)
MONOCYTES # BLD: 1.3 K/UL (ref 0.1–1.3)
MONOCYTES # BLD: 1.4 K/UL (ref 0.1–1.3)
MONOCYTES # BLD: 1.7 K/UL (ref 0.1–1.3)
MONOCYTES NFR BLD AUTO: 1 % (ref 4–12)
MONOCYTES NFR BLD AUTO: 11 % (ref 4–12)
MONOCYTES NFR BLD AUTO: 7 % (ref 4–12)
MONOCYTES NFR BLD AUTO: 8 % (ref 4–12)
MONOCYTES NFR BLD AUTO: 9 % (ref 4–12)
MUCOUS THREADS URNS QL MICRO: 0 /LPF
MUCOUS THREADS URNS QL MICRO: 0 /LPF
N GONORRHOEA RRNA SPEC QL NAA+PROBE: NEGATIVE
NEUTS SEG # BLD: 10.2 K/UL (ref 1.7–8.2)
NEUTS SEG # BLD: 13.9 K/UL (ref 1.7–8.2)
NEUTS SEG # BLD: 14.9 K/UL (ref 1.7–8.2)
NEUTS SEG # BLD: 3.2 K/UL (ref 1.7–8.2)
NEUTS SEG # BLD: 4.8 K/UL (ref 1.7–8.2)
NEUTS SEG # BLD: 4.9 K/UL (ref 1.7–8.2)
NEUTS SEG # BLD: 7.1 K/UL (ref 1.7–8.2)
NEUTS SEG # BLD: 8.4 K/UL (ref 1.7–8.2)
NEUTS SEG # BLD: 9.6 K/UL (ref 1.7–8.2)
NEUTS SEG # BLD: 9.9 K/UL (ref 1.7–8.2)
NEUTS SEG NFR BLD AUTO: 40 % (ref 43–78)
NEUTS SEG NFR BLD AUTO: 43 % (ref 43–78)
NEUTS SEG NFR BLD AUTO: 52 % (ref 43–78)
NEUTS SEG NFR BLD AUTO: 66 % (ref 43–78)
NEUTS SEG NFR BLD AUTO: 71 % (ref 43–78)
NEUTS SEG NFR BLD AUTO: 73 % (ref 43–78)
NEUTS SEG NFR BLD AUTO: 74 % (ref 43–78)
NEUTS SEG NFR BLD AUTO: 75 % (ref 43–78)
NEUTS SEG NFR BLD AUTO: 76 % (ref 43–78)
NEUTS SEG NFR BLD AUTO: 77 % (ref 43–78)
NITRITE UR QL STRIP.AUTO: NEGATIVE
NITRITE UR QL STRIP.AUTO: NEGATIVE
NITRITE UR QL STRIP.AUTO: POSITIVE
OPIATES UR QL: POSITIVE
OTHER OBSERVATIONS,UCOM: ABNORMAL
OXYHGB MFR BLDA: 91.6 % (ref 94–97)
P-R INTERVAL, ECG05: 116 MS
PCO2 BLDA: 34 MMHG (ref 35–45)
PCP UR QL: NEGATIVE
PH BLDA: 7.35 [PH] (ref 7.35–7.45)
PH UR STRIP: 5 [PH] (ref 5–9)
PH UR STRIP: 6 [PH] (ref 5–9)
PH UR STRIP: 6 [PH] (ref 5–9)
PHOSPHATE SERPL-MCNC: 2.3 MG/DL (ref 2.3–3.7)
PLATELET # BLD AUTO: 223 K/UL (ref 150–450)
PLATELET # BLD AUTO: 261 K/UL (ref 150–450)
PLATELET # BLD AUTO: 297 K/UL (ref 150–450)
PLATELET # BLD AUTO: 362 K/UL (ref 150–450)
PLATELET # BLD AUTO: 374 K/UL (ref 150–450)
PLATELET # BLD AUTO: 388 K/UL (ref 150–450)
PLATELET # BLD AUTO: 390 K/UL (ref 150–450)
PLATELET # BLD AUTO: 398 K/UL (ref 150–450)
PLATELET # BLD AUTO: 402 K/UL (ref 150–450)
PLATELET # BLD AUTO: 407 K/UL (ref 150–450)
PLATELET # BLD AUTO: 424 K/UL (ref 150–450)
PLATELET # BLD AUTO: 456 K/UL (ref 150–450)
PLATELET COMMENTS,PCOM: ADEQUATE
PMV BLD AUTO: 10.2 FL (ref 10.8–14.1)
PMV BLD AUTO: 8.7 FL (ref 10.8–14.1)
PMV BLD AUTO: 8.9 FL (ref 10.8–14.1)
PMV BLD AUTO: 9 FL (ref 10.8–14.1)
PMV BLD AUTO: 9.3 FL (ref 10.8–14.1)
PMV BLD AUTO: 9.4 FL (ref 10.8–14.1)
PMV BLD AUTO: 9.9 FL (ref 10.8–14.1)
PMV BLD AUTO: 9.9 FL (ref 10.8–14.1)
PO2 BLDA: 76 MMHG (ref 75–100)
POTASSIUM BLDA-SCNC: 5.36 MMOL/L (ref 3.5–5.3)
POTASSIUM SERPL-SCNC: 3.1 MMOL/L (ref 3.5–5.1)
POTASSIUM SERPL-SCNC: 3.6 MMOL/L (ref 3.5–5.1)
POTASSIUM SERPL-SCNC: 3.7 MMOL/L (ref 3.5–5.1)
POTASSIUM SERPL-SCNC: 3.9 MMOL/L (ref 3.5–5.1)
POTASSIUM SERPL-SCNC: 4 MMOL/L (ref 3.5–5.1)
POTASSIUM SERPL-SCNC: 4.1 MMOL/L (ref 3.5–5.1)
POTASSIUM SERPL-SCNC: 4.3 MMOL/L (ref 3.5–5.1)
POTASSIUM SERPL-SCNC: 4.3 MMOL/L (ref 3.5–5.1)
POTASSIUM SERPL-SCNC: 4.4 MMOL/L (ref 3.5–5.1)
POTASSIUM SERPL-SCNC: 4.6 MMOL/L (ref 3.5–5.1)
POTASSIUM SERPL-SCNC: 4.9 MMOL/L (ref 3.5–5.1)
POTASSIUM SERPL-SCNC: 4.9 MMOL/L (ref 3.5–5.1)
POTASSIUM SERPL-SCNC: 5 MMOL/L (ref 3.5–5.1)
POTASSIUM SERPL-SCNC: 5 MMOL/L (ref 3.5–5.1)
POTASSIUM SERPL-SCNC: 5.4 MMOL/L (ref 3.5–5.1)
POTASSIUM SERPL-SCNC: 5.4 MMOL/L (ref 3.5–5.1)
POTASSIUM SERPL-SCNC: 5.5 MMOL/L (ref 3.5–5.1)
POTASSIUM SERPL-SCNC: 5.8 MMOL/L (ref 3.5–5.1)
POTASSIUM SERPL-SCNC: 6 MMOL/L (ref 3.5–5.1)
PPD POC: NORMAL NEGATIVE
PROT SERPL-MCNC: 6.8 G/DL (ref 6.3–8.2)
PROT SERPL-MCNC: 6.9 G/DL (ref 6.3–8.2)
PROT SERPL-MCNC: 7 G/DL (ref 6.3–8.2)
PROT SERPL-MCNC: 7.4 G/DL (ref 6.3–8.2)
PROT SERPL-MCNC: 7.9 G/DL (ref 6.3–8.2)
PROT SERPL-MCNC: 8.1 G/DL (ref 6.3–8.2)
PROT SERPL-MCNC: 8.3 G/DL (ref 6.3–8.2)
PROT UR STRIP-MCNC: 100 MG/DL
PROT UR STRIP-MCNC: 100 MG/DL
PROT UR STRIP-MCNC: 30 MG/DL
PROTHROMBIN TIME: 10.9 SEC (ref 9.6–12)
PSA SERPL-MCNC: 0.8 NG/ML
Q-T INTERVAL, ECG07: 370 MS
QRS DURATION, ECG06: 114 MS
QTC CALCULATION (BEZET), ECG08: 442 MS
RBC # BLD AUTO: 2.35 M/UL (ref 4.23–5.67)
RBC # BLD AUTO: 2.75 M/UL (ref 4.23–5.67)
RBC # BLD AUTO: 3.07 M/UL (ref 4.23–5.67)
RBC # BLD AUTO: 3.23 M/UL (ref 4.23–5.67)
RBC # BLD AUTO: 3.37 M/UL (ref 4.23–5.67)
RBC # BLD AUTO: 3.38 M/UL (ref 4.23–5.67)
RBC # BLD AUTO: 3.64 M/UL (ref 4.23–5.67)
RBC # BLD AUTO: 3.7 M/UL (ref 4.23–5.67)
RBC # BLD AUTO: 4.02 M/UL (ref 4.23–5.67)
RBC # BLD AUTO: 4.04 M/UL (ref 4.23–5.67)
RBC # BLD AUTO: 4.08 M/UL (ref 4.23–5.67)
RBC # BLD AUTO: 4.25 M/UL (ref 4.23–5.67)
RBC #/AREA URNS HPF: ABNORMAL /HPF
RBC MORPH BLD: ABNORMAL
SAO2 % BLD: 93 % (ref 92–98.5)
SERVICE CMNT-IMP: ABNORMAL
SERVICE CMNT-IMP: NORMAL
SODIUM BLDA-SCNC: 124.5 MMOL/L (ref 135–148)
SODIUM SERPL-SCNC: 125 MMOL/L (ref 136–145)
SODIUM SERPL-SCNC: 130 MMOL/L (ref 136–145)
SODIUM SERPL-SCNC: 130 MMOL/L (ref 136–145)
SODIUM SERPL-SCNC: 131 MMOL/L (ref 136–145)
SODIUM SERPL-SCNC: 133 MMOL/L (ref 136–145)
SODIUM SERPL-SCNC: 134 MMOL/L (ref 136–145)
SODIUM SERPL-SCNC: 134 MMOL/L (ref 136–145)
SODIUM SERPL-SCNC: 135 MMOL/L (ref 136–145)
SODIUM SERPL-SCNC: 135 MMOL/L (ref 136–145)
SODIUM SERPL-SCNC: 136 MMOL/L (ref 136–145)
SP GR UR REFRACTOMETRY: 1.02 (ref 1–1.02)
SPECIMEN EXP DATE BLD: NORMAL
SPECIMEN SOURCE: NORMAL
STATUS OF UNIT,%ST: NORMAL
STATUS OF UNIT,%ST: NORMAL
TROPONIN I SERPL-MCNC: 0.96 NG/ML (ref 0.02–0.05)
TROPONIN I SERPL-MCNC: 1.01 NG/ML (ref 0.02–0.05)
TROPONIN I SERPL-MCNC: 1.15 NG/ML (ref 0.02–0.05)
TSH SERPL DL<=0.005 MIU/L-ACNC: 1.34 UIU/ML (ref 0.36–3.74)
UNIT DIVISION, %UDIV: 0
UNIT DIVISION, %UDIV: 0
UROBILINOGEN UR QL STRIP.AUTO: 0.2 EU/DL (ref 0.2–1)
UROBILINOGEN UR QL STRIP.AUTO: 1 EU/DL (ref 0.2–1)
UROBILINOGEN UR QL STRIP.AUTO: 1 EU/DL (ref 0.2–1)
VANCOMYCIN TROUGH SERPL-MCNC: 10.9 UG/ML (ref 5–20)
VENTRICULAR RATE, ECG03: 86 BPM
WBC # BLD AUTO: 11.4 K/UL (ref 4.3–11.1)
WBC # BLD AUTO: 11.5 K/UL (ref 4.3–11.1)
WBC # BLD AUTO: 12 K/UL (ref 4.3–11.1)
WBC # BLD AUTO: 13.3 K/UL (ref 4.3–11.1)
WBC # BLD AUTO: 13.4 K/UL (ref 4.3–11.1)
WBC # BLD AUTO: 13.5 K/UL (ref 4.3–11.1)
WBC # BLD AUTO: 14.4 K/UL (ref 4.3–11.1)
WBC # BLD AUTO: 14.5 K/UL (ref 4.3–11.1)
WBC # BLD AUTO: 18.2 K/UL (ref 4.3–11.1)
WBC # BLD AUTO: 20.3 K/UL (ref 4.3–11.1)
WBC # BLD AUTO: 6.5 K/UL (ref 4.3–11.1)
WBC # BLD AUTO: 7.4 K/UL (ref 4.3–11.1)
WBC MORPH BLD: ABNORMAL
WBC URNS QL MICRO: >100 /HPF
WBC URNS QL MICRO: ABNORMAL /HPF
WBC URNS QL MICRO: ABNORMAL /HPF
WET PREP GENITAL: NORMAL
WET PREP GENITAL: NORMAL
YEAST URNS QL MICRO: ABNORMAL
YEAST URNS QL MICRO: ABNORMAL

## 2017-01-01 PROCEDURE — 83735 ASSAY OF MAGNESIUM: CPT | Performed by: NURSE PRACTITIONER

## 2017-01-01 PROCEDURE — 36600 WITHDRAWAL OF ARTERIAL BLOOD: CPT

## 2017-01-01 PROCEDURE — 97161 PT EVAL LOW COMPLEX 20 MIN: CPT

## 2017-01-01 PROCEDURE — 84153 ASSAY OF PSA TOTAL: CPT | Performed by: NURSE PRACTITIONER

## 2017-01-01 PROCEDURE — 74011250636 HC RX REV CODE- 250/636: Performed by: INTERNAL MEDICINE

## 2017-01-01 PROCEDURE — 83605 ASSAY OF LACTIC ACID: CPT | Performed by: HOSPITALIST

## 2017-01-01 PROCEDURE — 82803 BLOOD GASES ANY COMBINATION: CPT

## 2017-01-01 PROCEDURE — 87210 SMEAR WET MOUNT SALINE/INK: CPT | Performed by: NURSE PRACTITIONER

## 2017-01-01 PROCEDURE — 80048 BASIC METABOLIC PNL TOTAL CA: CPT | Performed by: INTERNAL MEDICINE

## 2017-01-01 PROCEDURE — 97165 OT EVAL LOW COMPLEX 30 MIN: CPT

## 2017-01-01 PROCEDURE — 77030019605

## 2017-01-01 PROCEDURE — 71010 XR CHEST SNGL V: CPT

## 2017-01-01 PROCEDURE — 77030019927 HC TBNG IRR CYSTO BAXT -A

## 2017-01-01 PROCEDURE — 74011250637 HC RX REV CODE- 250/637: Performed by: INTERNAL MEDICINE

## 2017-01-01 PROCEDURE — 74011000250 HC RX REV CODE- 250: Performed by: HOSPITALIST

## 2017-01-01 PROCEDURE — 74011000250 HC RX REV CODE- 250: Performed by: NURSE PRACTITIONER

## 2017-01-01 PROCEDURE — 65660000000 HC RM CCU STEPDOWN

## 2017-01-01 PROCEDURE — 97162 PT EVAL MOD COMPLEX 30 MIN: CPT

## 2017-01-01 PROCEDURE — 74011250637 HC RX REV CODE- 250/637: Performed by: HOSPITALIST

## 2017-01-01 PROCEDURE — 74011000302 HC RX REV CODE- 302: Performed by: NURSE PRACTITIONER

## 2017-01-01 PROCEDURE — 87077 CULTURE AEROBIC IDENTIFY: CPT | Performed by: NURSE PRACTITIONER

## 2017-01-01 PROCEDURE — 80053 COMPREHEN METABOLIC PANEL: CPT | Performed by: NURSE PRACTITIONER

## 2017-01-01 PROCEDURE — 80177 DRUG SCRN QUAN LEVETIRACETAM: CPT | Performed by: INTERNAL MEDICINE

## 2017-01-01 PROCEDURE — 82962 GLUCOSE BLOOD TEST: CPT

## 2017-01-01 PROCEDURE — 71010 XR CHEST PORT: CPT

## 2017-01-01 PROCEDURE — 65270000029 HC RM PRIVATE

## 2017-01-01 PROCEDURE — 83880 ASSAY OF NATRIURETIC PEPTIDE: CPT | Performed by: EMERGENCY MEDICINE

## 2017-01-01 PROCEDURE — 74011000258 HC RX REV CODE- 258: Performed by: INTERNAL MEDICINE

## 2017-01-01 PROCEDURE — 81001 URINALYSIS AUTO W/SCOPE: CPT | Performed by: HOSPITALIST

## 2017-01-01 PROCEDURE — 74011636637 HC RX REV CODE- 636/637: Performed by: INTERNAL MEDICINE

## 2017-01-01 PROCEDURE — 99284 EMERGENCY DEPT VISIT MOD MDM: CPT | Performed by: EMERGENCY MEDICINE

## 2017-01-01 PROCEDURE — 74011000258 HC RX REV CODE- 258: Performed by: HOSPITALIST

## 2017-01-01 PROCEDURE — 87086 URINE CULTURE/COLONY COUNT: CPT | Performed by: HOSPITALIST

## 2017-01-01 PROCEDURE — 85018 HEMOGLOBIN: CPT | Performed by: HOSPITALIST

## 2017-01-01 PROCEDURE — 85027 COMPLETE CBC AUTOMATED: CPT | Performed by: INTERNAL MEDICINE

## 2017-01-01 PROCEDURE — 85025 COMPLETE CBC W/AUTO DIFF WBC: CPT | Performed by: EMERGENCY MEDICINE

## 2017-01-01 PROCEDURE — 99285 EMERGENCY DEPT VISIT HI MDM: CPT | Performed by: EMERGENCY MEDICINE

## 2017-01-01 PROCEDURE — 77010033678 HC OXYGEN DAILY

## 2017-01-01 PROCEDURE — 36430 TRANSFUSION BLD/BLD COMPNT: CPT | Performed by: NURSE ANESTHETIST, CERTIFIED REGISTERED

## 2017-01-01 PROCEDURE — 85025 COMPLETE CBC W/AUTO DIFF WBC: CPT | Performed by: INTERNAL MEDICINE

## 2017-01-01 PROCEDURE — 36415 COLL VENOUS BLD VENIPUNCTURE: CPT | Performed by: INTERNAL MEDICINE

## 2017-01-01 PROCEDURE — 85018 HEMOGLOBIN: CPT | Performed by: INTERNAL MEDICINE

## 2017-01-01 PROCEDURE — 92526 ORAL FUNCTION THERAPY: CPT

## 2017-01-01 PROCEDURE — 74011250636 HC RX REV CODE- 250/636: Performed by: HOSPITALIST

## 2017-01-01 PROCEDURE — 77030010545

## 2017-01-01 PROCEDURE — 83036 HEMOGLOBIN GLYCOSYLATED A1C: CPT

## 2017-01-01 PROCEDURE — 83605 ASSAY OF LACTIC ACID: CPT

## 2017-01-01 PROCEDURE — 86923 COMPATIBILITY TEST ELECTRIC: CPT | Performed by: NURSE PRACTITIONER

## 2017-01-01 PROCEDURE — 80048 BASIC METABOLIC PNL TOTAL CA: CPT | Performed by: HOSPITALIST

## 2017-01-01 PROCEDURE — 74011000250 HC RX REV CODE- 250: Performed by: INTERNAL MEDICINE

## 2017-01-01 PROCEDURE — 74011250636 HC RX REV CODE- 250/636: Performed by: NURSE PRACTITIONER

## 2017-01-01 PROCEDURE — 87641 MR-STAPH DNA AMP PROBE: CPT | Performed by: INTERNAL MEDICINE

## 2017-01-01 PROCEDURE — 36556 INSERT NON-TUNNEL CV CATH: CPT | Performed by: INTERNAL MEDICINE

## 2017-01-01 PROCEDURE — 36415 COLL VENOUS BLD VENIPUNCTURE: CPT | Performed by: NURSE PRACTITIONER

## 2017-01-01 PROCEDURE — 74011250636 HC RX REV CODE- 250/636: Performed by: EMERGENCY MEDICINE

## 2017-01-01 PROCEDURE — 77030012935 HC DRSG AQUACEL BMS -B

## 2017-01-01 PROCEDURE — 81015 MICROSCOPIC EXAM OF URINE: CPT | Performed by: HOSPITALIST

## 2017-01-01 PROCEDURE — 80339 ANTIEPILEPTICS NOS 1-3: CPT | Performed by: HOSPITALIST

## 2017-01-01 PROCEDURE — 80202 ASSAY OF VANCOMYCIN: CPT | Performed by: INTERNAL MEDICINE

## 2017-01-01 PROCEDURE — 74011000258 HC RX REV CODE- 258: Performed by: EMERGENCY MEDICINE

## 2017-01-01 PROCEDURE — 77030018836 HC SOL IRR NACL ICUM -A

## 2017-01-01 PROCEDURE — 83880 ASSAY OF NATRIURETIC PEPTIDE: CPT | Performed by: HOSPITALIST

## 2017-01-01 PROCEDURE — 83735 ASSAY OF MAGNESIUM: CPT | Performed by: INTERNAL MEDICINE

## 2017-01-01 PROCEDURE — B548ZZA ULTRASONOGRAPHY OF SUPERIOR VENA CAVA, GUIDANCE: ICD-10-PCS | Performed by: INTERNAL MEDICINE

## 2017-01-01 PROCEDURE — 80076 HEPATIC FUNCTION PANEL: CPT | Performed by: INTERNAL MEDICINE

## 2017-01-01 PROCEDURE — C1751 CATH, INF, PER/CENT/MIDLINE: HCPCS

## 2017-01-01 PROCEDURE — 86580 TB INTRADERMAL TEST: CPT | Performed by: NURSE PRACTITIONER

## 2017-01-01 PROCEDURE — 71250 CT THORAX DX C-: CPT

## 2017-01-01 PROCEDURE — 80074 ACUTE HEPATITIS PANEL: CPT | Performed by: HOSPITALIST

## 2017-01-01 PROCEDURE — 74011250637 HC RX REV CODE- 250/637: Performed by: NURSE PRACTITIONER

## 2017-01-01 PROCEDURE — 74011250637 HC RX REV CODE- 250/637

## 2017-01-01 PROCEDURE — 94640 AIRWAY INHALATION TREATMENT: CPT

## 2017-01-01 PROCEDURE — 80048 BASIC METABOLIC PNL TOTAL CA: CPT | Performed by: SURGERY

## 2017-01-01 PROCEDURE — 87086 URINE CULTURE/COLONY COUNT: CPT | Performed by: NURSE PRACTITIONER

## 2017-01-01 PROCEDURE — 77030034850

## 2017-01-01 PROCEDURE — 74011000258 HC RX REV CODE- 258: Performed by: NURSE PRACTITIONER

## 2017-01-01 PROCEDURE — 87106 FUNGI IDENTIFICATION YEAST: CPT | Performed by: HOSPITALIST

## 2017-01-01 PROCEDURE — 65620000000 HC RM CCU GENERAL

## 2017-01-01 PROCEDURE — 87491 CHLMYD TRACH DNA AMP PROBE: CPT | Performed by: NURSE PRACTITIONER

## 2017-01-01 PROCEDURE — 87088 URINE BACTERIA CULTURE: CPT | Performed by: EMERGENCY MEDICINE

## 2017-01-01 PROCEDURE — 36592 COLLECT BLOOD FROM PICC: CPT

## 2017-01-01 PROCEDURE — 87040 BLOOD CULTURE FOR BACTERIA: CPT | Performed by: HOSPITALIST

## 2017-01-01 PROCEDURE — 76870 US EXAM SCROTUM: CPT

## 2017-01-01 PROCEDURE — 77030027688 HC DRSG MEPILEX 16-48IN NO BORD MOLN -A

## 2017-01-01 PROCEDURE — 80053 COMPREHEN METABOLIC PANEL: CPT | Performed by: EMERGENCY MEDICINE

## 2017-01-01 PROCEDURE — 82140 ASSAY OF AMMONIA: CPT | Performed by: HOSPITALIST

## 2017-01-01 PROCEDURE — 85025 COMPLETE CBC W/AUTO DIFF WBC: CPT | Performed by: NURSE PRACTITIONER

## 2017-01-01 PROCEDURE — 51798 US URINE CAPACITY MEASURE: CPT

## 2017-01-01 PROCEDURE — 80053 COMPREHEN METABOLIC PANEL: CPT | Performed by: HOSPITALIST

## 2017-01-01 PROCEDURE — 83690 ASSAY OF LIPASE: CPT | Performed by: HOSPITALIST

## 2017-01-01 PROCEDURE — 0656 HSPC GENERAL INPATIENT

## 2017-01-01 PROCEDURE — 99232 SBSQ HOSP IP/OBS MODERATE 35: CPT | Performed by: INTERNAL MEDICINE

## 2017-01-01 PROCEDURE — 87205 SMEAR GRAM STAIN: CPT | Performed by: NURSE PRACTITIONER

## 2017-01-01 PROCEDURE — 86900 BLOOD TYPING SEROLOGIC ABO: CPT | Performed by: NURSE PRACTITIONER

## 2017-01-01 PROCEDURE — 02HV33Z INSERTION OF INFUSION DEVICE INTO SUPERIOR VENA CAVA, PERCUTANEOUS APPROACH: ICD-10-PCS | Performed by: INTERNAL MEDICINE

## 2017-01-01 PROCEDURE — 77030021668 HC NEB PREFIL KT VYRM -A

## 2017-01-01 PROCEDURE — 72192 CT PELVIS W/O DYE: CPT

## 2017-01-01 PROCEDURE — 74011000250 HC RX REV CODE- 250: Performed by: EMERGENCY MEDICINE

## 2017-01-01 PROCEDURE — 81015 MICROSCOPIC EXAM OF URINE: CPT | Performed by: EMERGENCY MEDICINE

## 2017-01-01 PROCEDURE — 81001 URINALYSIS AUTO W/SCOPE: CPT | Performed by: NURSE PRACTITIONER

## 2017-01-01 PROCEDURE — 74011000302 HC RX REV CODE- 302: Performed by: INTERNAL MEDICINE

## 2017-01-01 PROCEDURE — 80307 DRUG TEST PRSMV CHEM ANLYZR: CPT | Performed by: HOSPITALIST

## 2017-01-01 PROCEDURE — 84443 ASSAY THYROID STIM HORMONE: CPT | Performed by: HOSPITALIST

## 2017-01-01 PROCEDURE — 84484 ASSAY OF TROPONIN QUANT: CPT | Performed by: HOSPITALIST

## 2017-01-01 PROCEDURE — 99223 1ST HOSP IP/OBS HIGH 75: CPT | Performed by: INTERNAL MEDICINE

## 2017-01-01 PROCEDURE — 74011250637 HC RX REV CODE- 250/637: Performed by: EMERGENCY MEDICINE

## 2017-01-01 PROCEDURE — 87186 SC STD MICRODIL/AGAR DIL: CPT | Performed by: NURSE PRACTITIONER

## 2017-01-01 PROCEDURE — 99407 BEHAV CHNG SMOKING > 10 MIN: CPT

## 2017-01-01 PROCEDURE — P9016 RBC LEUKOCYTES REDUCED: HCPCS | Performed by: NURSE PRACTITIONER

## 2017-01-01 PROCEDURE — 85018 HEMOGLOBIN: CPT | Performed by: FAMILY MEDICINE

## 2017-01-01 PROCEDURE — 85025 COMPLETE CBC W/AUTO DIFF WBC: CPT

## 2017-01-01 PROCEDURE — 83036 HEMOGLOBIN GLYCOSYLATED A1C: CPT | Performed by: NURSE PRACTITIONER

## 2017-01-01 PROCEDURE — 30243N1 TRANSFUSION OF NONAUTOLOGOUS RED BLOOD CELLS INTO CENTRAL VEIN, PERCUTANEOUS APPROACH: ICD-10-PCS | Performed by: HOSPITALIST

## 2017-01-01 PROCEDURE — 80053 COMPREHEN METABOLIC PANEL: CPT

## 2017-01-01 PROCEDURE — 85025 COMPLETE CBC W/AUTO DIFF WBC: CPT | Performed by: HOSPITALIST

## 2017-01-01 PROCEDURE — 77030005549 HC CATH URETH FOL 3W COVD -A

## 2017-01-01 PROCEDURE — 87186 SC STD MICRODIL/AGAR DIL: CPT | Performed by: EMERGENCY MEDICINE

## 2017-01-01 PROCEDURE — 82272 OCCULT BLD FECES 1-3 TESTS: CPT | Performed by: INTERNAL MEDICINE

## 2017-01-01 PROCEDURE — 72197 MRI PELVIS W/O & W/DYE: CPT

## 2017-01-01 PROCEDURE — 87086 URINE CULTURE/COLONY COUNT: CPT | Performed by: EMERGENCY MEDICINE

## 2017-01-01 PROCEDURE — A9577 INJ MULTIHANCE: HCPCS | Performed by: INTERNAL MEDICINE

## 2017-01-01 PROCEDURE — 71020 XR CHEST PA LAT: CPT

## 2017-01-01 PROCEDURE — 74011000250 HC RX REV CODE- 250

## 2017-01-01 PROCEDURE — 3336500001 HSPC ELECTION

## 2017-01-01 PROCEDURE — 76770 US EXAM ABDO BACK WALL COMP: CPT

## 2017-01-01 PROCEDURE — 77030013131 HC IV BLD ST ICUM -A

## 2017-01-01 PROCEDURE — 80177 DRUG SCRN QUAN LEVETIRACETAM: CPT | Performed by: HOSPITALIST

## 2017-01-01 PROCEDURE — 83880 ASSAY OF NATRIURETIC PEPTIDE: CPT | Performed by: INTERNAL MEDICINE

## 2017-01-01 PROCEDURE — 84132 ASSAY OF SERUM POTASSIUM: CPT | Performed by: HOSPITALIST

## 2017-01-01 PROCEDURE — 85610 PROTHROMBIN TIME: CPT | Performed by: INTERNAL MEDICINE

## 2017-01-01 PROCEDURE — 87389 HIV-1 AG W/HIV-1&-2 AB AG IA: CPT | Performed by: NURSE PRACTITIONER

## 2017-01-01 PROCEDURE — 81003 URINALYSIS AUTO W/O SCOPE: CPT | Performed by: EMERGENCY MEDICINE

## 2017-01-01 PROCEDURE — 86580 TB INTRADERMAL TEST: CPT | Performed by: INTERNAL MEDICINE

## 2017-01-01 PROCEDURE — 93005 ELECTROCARDIOGRAM TRACING: CPT | Performed by: HOSPITALIST

## 2017-01-01 PROCEDURE — 94760 N-INVAS EAR/PLS OXIMETRY 1: CPT

## 2017-01-01 PROCEDURE — 87040 BLOOD CULTURE FOR BACTERIA: CPT | Performed by: EMERGENCY MEDICINE

## 2017-01-01 PROCEDURE — 84100 ASSAY OF PHOSPHORUS: CPT | Performed by: NURSE PRACTITIONER

## 2017-01-01 PROCEDURE — 82947 ASSAY GLUCOSE BLOOD QUANT: CPT

## 2017-01-01 PROCEDURE — 93306 TTE W/DOPPLER COMPLETE: CPT

## 2017-01-01 RX ORDER — SODIUM CHLORIDE 0.9 % (FLUSH) 0.9 %
5-10 SYRINGE (ML) INJECTION EVERY 8 HOURS
Status: DISCONTINUED | OUTPATIENT
Start: 2017-01-01 | End: 2017-01-01 | Stop reason: HOSPADM

## 2017-01-01 RX ORDER — ZOLPIDEM TARTRATE 5 MG/1
5 TABLET ORAL
Status: DISCONTINUED | OUTPATIENT
Start: 2017-01-01 | End: 2017-01-01 | Stop reason: HOSPADM

## 2017-01-01 RX ORDER — HYDROCODONE BITARTRATE AND ACETAMINOPHEN 5; 325 MG/1; MG/1
1 TABLET ORAL
Status: DISCONTINUED | OUTPATIENT
Start: 2017-01-01 | End: 2017-01-01 | Stop reason: HOSPADM

## 2017-01-01 RX ORDER — HYDROMORPHONE HYDROCHLORIDE 1 MG/ML
1 INJECTION, SOLUTION INTRAMUSCULAR; INTRAVENOUS; SUBCUTANEOUS
Status: DISCONTINUED | OUTPATIENT
Start: 2017-01-01 | End: 2017-01-01 | Stop reason: HOSPADM

## 2017-01-01 RX ORDER — CEFPODOXIME PROXETIL 200 MG/1
200 TABLET, FILM COATED ORAL 2 TIMES DAILY
Qty: 14 TAB | Refills: 0 | Status: SHIPPED
Start: 2017-01-01 | End: 2017-01-01

## 2017-01-01 RX ORDER — NOREPINEPHRINE BITARTRATE/D5W 4MG/250ML
2-16 PLASTIC BAG, INJECTION (ML) INTRAVENOUS
Status: DISCONTINUED | OUTPATIENT
Start: 2017-01-01 | End: 2017-01-01

## 2017-01-01 RX ORDER — LACOSAMIDE 100 MG/1
100 TABLET ORAL 2 TIMES DAILY
Status: DISCONTINUED | OUTPATIENT
Start: 2017-01-01 | End: 2017-01-01 | Stop reason: HOSPADM

## 2017-01-01 RX ORDER — UREA 10 %
2 LOTION (ML) TOPICAL 2 TIMES DAILY
Status: DISCONTINUED | OUTPATIENT
Start: 2017-01-01 | End: 2017-01-01 | Stop reason: HOSPADM

## 2017-01-01 RX ORDER — DEXTROSE 50 % IN WATER (D50W) INTRAVENOUS SYRINGE
Status: COMPLETED
Start: 2017-01-01 | End: 2017-01-01

## 2017-01-01 RX ORDER — ACETAMINOPHEN 325 MG/1
650 TABLET ORAL
Status: DISCONTINUED | OUTPATIENT
Start: 2017-01-01 | End: 2017-01-01 | Stop reason: HOSPADM

## 2017-01-01 RX ORDER — SODIUM CHLORIDE 9 MG/ML
100 INJECTION, SOLUTION INTRAVENOUS CONTINUOUS
Status: DISCONTINUED | OUTPATIENT
Start: 2017-01-01 | End: 2017-01-01

## 2017-01-01 RX ORDER — HYDROMORPHONE HYDROCHLORIDE 2 MG/1
2 TABLET ORAL
Status: DISCONTINUED | OUTPATIENT
Start: 2017-01-01 | End: 2017-01-01

## 2017-01-01 RX ORDER — ATORVASTATIN CALCIUM 40 MG/1
40 TABLET, FILM COATED ORAL
Status: DISCONTINUED | OUTPATIENT
Start: 2017-01-01 | End: 2017-01-01

## 2017-01-01 RX ORDER — FUROSEMIDE 10 MG/ML
40 INJECTION INTRAMUSCULAR; INTRAVENOUS ONCE
Status: COMPLETED | OUTPATIENT
Start: 2017-01-01 | End: 2017-01-01

## 2017-01-01 RX ORDER — GLYCOPYRROLATE 0.2 MG/ML
0.2 INJECTION INTRAMUSCULAR; INTRAVENOUS
Status: DISCONTINUED | OUTPATIENT
Start: 2017-01-01 | End: 2017-01-01 | Stop reason: HOSPADM

## 2017-01-01 RX ORDER — ATORVASTATIN CALCIUM 40 MG/1
80 TABLET, FILM COATED ORAL
Status: DISCONTINUED | OUTPATIENT
Start: 2017-01-01 | End: 2017-01-01 | Stop reason: HOSPADM

## 2017-01-01 RX ORDER — HYDROMORPHONE HYDROCHLORIDE 1 MG/ML
0.5 INJECTION, SOLUTION INTRAMUSCULAR; INTRAVENOUS; SUBCUTANEOUS
Status: DISCONTINUED | OUTPATIENT
Start: 2017-01-01 | End: 2017-01-01

## 2017-01-01 RX ORDER — HYDROMORPHONE HYDROCHLORIDE 1 MG/ML
1 INJECTION, SOLUTION INTRAMUSCULAR; INTRAVENOUS; SUBCUTANEOUS
Status: DISCONTINUED | OUTPATIENT
Start: 2017-01-01 | End: 2017-01-01

## 2017-01-01 RX ORDER — HEPARIN SODIUM 5000 [USP'U]/ML
5000 INJECTION, SOLUTION INTRAVENOUS; SUBCUTANEOUS EVERY 8 HOURS
Status: DISCONTINUED | OUTPATIENT
Start: 2017-01-01 | End: 2017-01-01

## 2017-01-01 RX ORDER — FUROSEMIDE 40 MG/1
40 TABLET ORAL DAILY
Qty: 30 TAB | Refills: 1 | Status: SHIPPED | OUTPATIENT
Start: 2017-01-01

## 2017-01-01 RX ORDER — INSULIN GLARGINE 100 [IU]/ML
10 INJECTION, SOLUTION SUBCUTANEOUS
Status: DISCONTINUED | OUTPATIENT
Start: 2017-01-01 | End: 2017-01-01

## 2017-01-01 RX ORDER — HALOPERIDOL 5 MG/ML
4 INJECTION INTRAMUSCULAR
Status: DISCONTINUED | OUTPATIENT
Start: 2017-01-01 | End: 2017-01-01 | Stop reason: HOSPADM

## 2017-01-01 RX ORDER — VANCOMYCIN/0.9 % SOD CHLORIDE 1.5G/250ML
1500 PLASTIC BAG, INJECTION (ML) INTRAVENOUS EVERY 24 HOURS
Status: DISCONTINUED | OUTPATIENT
Start: 2017-01-01 | End: 2017-01-01 | Stop reason: HOSPADM

## 2017-01-01 RX ORDER — IBUPROFEN 200 MG
1 TABLET ORAL DAILY
Status: COMPLETED | OUTPATIENT
Start: 2017-01-01 | End: 2017-01-01

## 2017-01-01 RX ORDER — CLINDAMYCIN HYDROCHLORIDE 300 MG/1
300 CAPSULE ORAL 3 TIMES DAILY
Qty: 21 CAP | Refills: 0 | Status: SHIPPED
Start: 2017-01-01 | End: 2017-01-01

## 2017-01-01 RX ORDER — FENTANYL 75 UG/H
1 PATCH TRANSDERMAL
Status: DISCONTINUED | OUTPATIENT
Start: 2017-01-01 | End: 2017-01-01 | Stop reason: HOSPADM

## 2017-01-01 RX ORDER — HALOPERIDOL 5 MG/ML
2 INJECTION INTRAMUSCULAR
Status: DISCONTINUED | OUTPATIENT
Start: 2017-01-01 | End: 2017-01-01 | Stop reason: HOSPADM

## 2017-01-01 RX ORDER — LORAZEPAM 2 MG/ML
2 INJECTION INTRAMUSCULAR
Status: DISCONTINUED | OUTPATIENT
Start: 2017-01-01 | End: 2017-01-01 | Stop reason: HOSPADM

## 2017-01-01 RX ORDER — FLUCONAZOLE 100 MG/1
200 TABLET ORAL DAILY
Status: DISCONTINUED | OUTPATIENT
Start: 2017-01-01 | End: 2017-01-01 | Stop reason: HOSPADM

## 2017-01-01 RX ORDER — DEXTROSE 40 %
15 GEL (GRAM) ORAL AS NEEDED
Status: DISCONTINUED | OUTPATIENT
Start: 2017-01-01 | End: 2017-01-01 | Stop reason: HOSPADM

## 2017-01-01 RX ORDER — SODIUM CHLORIDE 0.9 % (FLUSH) 0.9 %
5-10 SYRINGE (ML) INJECTION AS NEEDED
Status: DISCONTINUED | OUTPATIENT
Start: 2017-01-01 | End: 2017-01-01 | Stop reason: SDUPTHER

## 2017-01-01 RX ORDER — SODIUM CHLORIDE 0.9 % (FLUSH) 0.9 %
10 SYRINGE (ML) INJECTION
Status: COMPLETED | OUTPATIENT
Start: 2017-01-01 | End: 2017-01-01

## 2017-01-01 RX ORDER — LEVETIRACETAM 500 MG/1
500 TABLET ORAL 2 TIMES DAILY
Status: DISCONTINUED | OUTPATIENT
Start: 2017-01-01 | End: 2017-01-01 | Stop reason: HOSPADM

## 2017-01-01 RX ORDER — DIPHENHYDRAMINE HYDROCHLORIDE 50 MG/ML
25 INJECTION, SOLUTION INTRAMUSCULAR; INTRAVENOUS
Status: DISCONTINUED | OUTPATIENT
Start: 2017-01-01 | End: 2017-01-01 | Stop reason: HOSPADM

## 2017-01-01 RX ORDER — QUETIAPINE FUMARATE 25 MG/1
25-50 TABLET, FILM COATED ORAL
Status: DISCONTINUED | OUTPATIENT
Start: 2017-01-01 | End: 2017-01-01 | Stop reason: HOSPADM

## 2017-01-01 RX ORDER — SPIRONOLACTONE 25 MG/1
25 TABLET ORAL DAILY
Qty: 30 TAB | Refills: 1 | Status: SHIPPED | OUTPATIENT
Start: 2017-01-01

## 2017-01-01 RX ORDER — DIPHENHYDRAMINE HCL 25 MG
50 CAPSULE ORAL ONCE
Status: COMPLETED | OUTPATIENT
Start: 2017-01-01 | End: 2017-01-01

## 2017-01-01 RX ORDER — LEVETIRACETAM 500 MG/1
500 TABLET ORAL 2 TIMES DAILY
Status: DISCONTINUED | OUTPATIENT
Start: 2017-01-01 | End: 2017-01-01

## 2017-01-01 RX ORDER — INSULIN GLARGINE 100 [IU]/ML
20 INJECTION, SOLUTION SUBCUTANEOUS DAILY
Status: DISCONTINUED | OUTPATIENT
Start: 2017-01-01 | End: 2017-01-01 | Stop reason: HOSPADM

## 2017-01-01 RX ORDER — HYDROCODONE BITARTRATE AND ACETAMINOPHEN 10; 325 MG/1; MG/1
1 TABLET ORAL
Status: DISCONTINUED | OUTPATIENT
Start: 2017-01-01 | End: 2017-01-01 | Stop reason: HOSPADM

## 2017-01-01 RX ORDER — LEVOFLOXACIN 750 MG/1
750 TABLET ORAL DAILY
Qty: 4 TAB | Refills: 0 | Status: SHIPPED | OUTPATIENT
Start: 2017-01-01 | End: 2017-01-01

## 2017-01-01 RX ORDER — ASPIRIN 81 MG/1
81 TABLET ORAL DAILY
Status: DISCONTINUED | OUTPATIENT
Start: 2017-01-01 | End: 2017-01-01 | Stop reason: HOSPADM

## 2017-01-01 RX ORDER — DEXTROSE MONOHYDRATE 100 MG/ML
70 INJECTION, SOLUTION INTRAVENOUS CONTINUOUS
Status: DISCONTINUED | OUTPATIENT
Start: 2017-01-01 | End: 2017-01-01

## 2017-01-01 RX ORDER — HALOPERIDOL 1 MG/1
2 TABLET ORAL
Status: DISCONTINUED | OUTPATIENT
Start: 2017-01-01 | End: 2017-01-01

## 2017-01-01 RX ORDER — DIPHENHYDRAMINE HYDROCHLORIDE 50 MG/ML
12.5 INJECTION, SOLUTION INTRAMUSCULAR; INTRAVENOUS
Status: DISCONTINUED | OUTPATIENT
Start: 2017-01-01 | End: 2017-01-01 | Stop reason: HOSPADM

## 2017-01-01 RX ORDER — CARVEDILOL 3.12 MG/1
3.12 TABLET ORAL 2 TIMES DAILY WITH MEALS
Status: DISCONTINUED | OUTPATIENT
Start: 2017-01-01 | End: 2017-01-01

## 2017-01-01 RX ORDER — FAMOTIDINE 20 MG/1
20 TABLET, FILM COATED ORAL DAILY
Status: DISCONTINUED | OUTPATIENT
Start: 2017-01-01 | End: 2017-01-01 | Stop reason: HOSPADM

## 2017-01-01 RX ORDER — LISINOPRIL 5 MG/1
2.5 TABLET ORAL DAILY
Status: DISCONTINUED | OUTPATIENT
Start: 2017-01-01 | End: 2017-01-01 | Stop reason: HOSPADM

## 2017-01-01 RX ORDER — SPIRONOLACTONE 25 MG/1
25 TABLET ORAL DAILY
Status: DISCONTINUED | OUTPATIENT
Start: 2017-01-01 | End: 2017-01-01 | Stop reason: HOSPADM

## 2017-01-01 RX ORDER — ONDANSETRON 2 MG/ML
4 INJECTION INTRAMUSCULAR; INTRAVENOUS
Status: DISCONTINUED | OUTPATIENT
Start: 2017-01-01 | End: 2017-01-01 | Stop reason: HOSPADM

## 2017-01-01 RX ORDER — HYDROCODONE BITARTRATE AND ACETAMINOPHEN 5; 325 MG/1; MG/1
1 TABLET ORAL
Qty: 15 TAB | Refills: 0 | Status: SHIPPED | OUTPATIENT
Start: 2017-01-01

## 2017-01-01 RX ORDER — DEXTROSE 40 %
GEL (GRAM) ORAL
Status: COMPLETED
Start: 2017-01-01 | End: 2017-01-01

## 2017-01-01 RX ORDER — INSULIN GLARGINE 100 [IU]/ML
25 INJECTION, SOLUTION SUBCUTANEOUS
Status: ON HOLD | COMMUNITY
End: 2017-01-01

## 2017-01-01 RX ORDER — PHENOBARBITAL SODIUM 65 MG/ML
65 INJECTION INTRAMUSCULAR EVERY 12 HOURS
Status: DISCONTINUED | OUTPATIENT
Start: 2017-01-01 | End: 2017-01-01 | Stop reason: HOSPADM

## 2017-01-01 RX ORDER — ACETAMINOPHEN 650 MG/1
650 SUPPOSITORY RECTAL
Status: DISCONTINUED | OUTPATIENT
Start: 2017-01-01 | End: 2017-01-01 | Stop reason: HOSPADM

## 2017-01-01 RX ORDER — AMOXICILLIN 250 MG
2 CAPSULE ORAL DAILY
Qty: 30 TAB | Refills: 0 | Status: SHIPPED
Start: 2017-01-01

## 2017-01-01 RX ORDER — SODIUM CHLORIDE 9 MG/ML
250 INJECTION, SOLUTION INTRAVENOUS AS NEEDED
Status: DISCONTINUED | OUTPATIENT
Start: 2017-01-01 | End: 2017-01-01 | Stop reason: HOSPADM

## 2017-01-01 RX ORDER — FLUCONAZOLE 2 MG/ML
200 INJECTION, SOLUTION INTRAVENOUS ONCE
Status: COMPLETED | OUTPATIENT
Start: 2017-01-01 | End: 2017-01-01

## 2017-01-01 RX ORDER — INSULIN GLARGINE 100 [IU]/ML
35 INJECTION, SOLUTION SUBCUTANEOUS
Status: DISCONTINUED | OUTPATIENT
Start: 2017-01-01 | End: 2017-01-01

## 2017-01-01 RX ORDER — LACOSAMIDE 100 MG/1
100 TABLET ORAL 2 TIMES DAILY
COMMUNITY

## 2017-01-01 RX ORDER — SODIUM CHLORIDE 0.9 % (FLUSH) 0.9 %
3 SYRINGE (ML) INJECTION AS NEEDED
Status: DISCONTINUED | OUTPATIENT
Start: 2017-01-01 | End: 2017-01-01 | Stop reason: HOSPADM

## 2017-01-01 RX ORDER — AMOXICILLIN 250 MG
2 CAPSULE ORAL
Status: DISCONTINUED | OUTPATIENT
Start: 2017-01-01 | End: 2017-01-01 | Stop reason: HOSPADM

## 2017-01-01 RX ORDER — SODIUM CHLORIDE 0.9 % (FLUSH) 0.9 %
5-10 SYRINGE (ML) INJECTION AS NEEDED
Status: DISCONTINUED | OUTPATIENT
Start: 2017-01-01 | End: 2017-01-01

## 2017-01-01 RX ORDER — DEXTROSE 50 % IN WATER (D50W) INTRAVENOUS SYRINGE
25-50 AS NEEDED
Status: DISCONTINUED | OUTPATIENT
Start: 2017-01-01 | End: 2017-01-01 | Stop reason: HOSPADM

## 2017-01-01 RX ORDER — PREDNISONE 50 MG/1
50 TABLET ORAL ONCE
Status: COMPLETED | OUTPATIENT
Start: 2017-01-01 | End: 2017-01-01

## 2017-01-01 RX ORDER — ATORVASTATIN CALCIUM 40 MG/1
80 TABLET, FILM COATED ORAL DAILY
Status: DISCONTINUED | OUTPATIENT
Start: 2017-01-01 | End: 2017-01-01

## 2017-01-01 RX ORDER — INSULIN GLARGINE 100 [IU]/ML
25 INJECTION, SOLUTION SUBCUTANEOUS
Status: DISCONTINUED | OUTPATIENT
Start: 2017-01-01 | End: 2017-01-01

## 2017-01-01 RX ORDER — FUROSEMIDE 40 MG/1
40 TABLET ORAL DAILY
Status: DISCONTINUED | OUTPATIENT
Start: 2017-01-01 | End: 2017-01-01 | Stop reason: HOSPADM

## 2017-01-01 RX ORDER — LEVETIRACETAM 250 MG/1
250 TABLET ORAL 2 TIMES DAILY
Status: DISCONTINUED | OUTPATIENT
Start: 2017-01-01 | End: 2017-01-01 | Stop reason: HOSPADM

## 2017-01-01 RX ORDER — SODIUM CHLORIDE 0.9 % (FLUSH) 0.9 %
5-10 SYRINGE (ML) INJECTION EVERY 8 HOURS
Status: DISCONTINUED | OUTPATIENT
Start: 2017-01-01 | End: 2017-01-01

## 2017-01-01 RX ORDER — POLYETHYLENE GLYCOL 3350 17 G/17G
17 POWDER, FOR SOLUTION ORAL
Qty: 30 EACH | Refills: 0 | Status: SHIPPED
Start: 2017-01-01

## 2017-01-01 RX ORDER — HEPARIN SODIUM 5000 [USP'U]/ML
5000 INJECTION, SOLUTION INTRAVENOUS; SUBCUTANEOUS EVERY 8 HOURS
Status: DISCONTINUED | OUTPATIENT
Start: 2017-01-01 | End: 2017-01-01 | Stop reason: HOSPADM

## 2017-01-01 RX ORDER — CARVEDILOL 6.25 MG/1
6.25 TABLET ORAL 2 TIMES DAILY WITH MEALS
Status: DISCONTINUED | OUTPATIENT
Start: 2017-01-01 | End: 2017-01-01

## 2017-01-01 RX ORDER — DIPHENHYDRAMINE HCL 25 MG
25 CAPSULE ORAL
Status: DISCONTINUED | OUTPATIENT
Start: 2017-01-01 | End: 2017-01-01

## 2017-01-01 RX ORDER — MORPHINE SULFATE 4 MG/ML
4 INJECTION, SOLUTION INTRAMUSCULAR; INTRAVENOUS
Status: DISCONTINUED | OUTPATIENT
Start: 2017-01-01 | End: 2017-01-01 | Stop reason: HOSPADM

## 2017-01-01 RX ORDER — INSULIN LISPRO 100 [IU]/ML
INJECTION, SOLUTION INTRAVENOUS; SUBCUTANEOUS
Status: DISCONTINUED | OUTPATIENT
Start: 2017-01-01 | End: 2017-01-01

## 2017-01-01 RX ORDER — INSULIN GLARGINE 100 [IU]/ML
20 INJECTION, SOLUTION SUBCUTANEOUS
Status: DISCONTINUED | OUTPATIENT
Start: 2017-01-01 | End: 2017-01-01

## 2017-01-01 RX ORDER — GABAPENTIN 300 MG/1
600 CAPSULE ORAL 3 TIMES DAILY
Status: DISCONTINUED | OUTPATIENT
Start: 2017-01-01 | End: 2017-01-01 | Stop reason: HOSPADM

## 2017-01-01 RX ORDER — INSULIN GLARGINE 100 [IU]/ML
20 INJECTION, SOLUTION SUBCUTANEOUS
Qty: 1 VIAL | Refills: 0 | Status: SHIPPED
Start: 2017-01-01 | End: 2017-01-01

## 2017-01-01 RX ORDER — HYDRALAZINE HYDROCHLORIDE 20 MG/ML
20 INJECTION INTRAMUSCULAR; INTRAVENOUS
Status: DISCONTINUED | OUTPATIENT
Start: 2017-01-01 | End: 2017-01-01 | Stop reason: HOSPADM

## 2017-01-01 RX ORDER — HYDROCODONE BITARTRATE AND ACETAMINOPHEN 5; 325 MG/1; MG/1
1 TABLET ORAL
Qty: 15 TAB | Refills: 0 | Status: SHIPPED | OUTPATIENT
Start: 2017-01-01 | End: 2017-01-01

## 2017-01-01 RX ORDER — ALBUTEROL SULFATE 0.83 MG/ML
2.5 SOLUTION RESPIRATORY (INHALATION)
Status: COMPLETED | OUTPATIENT
Start: 2017-01-01 | End: 2017-01-01

## 2017-01-01 RX ORDER — FAMOTIDINE 20 MG/1
20 TABLET, FILM COATED ORAL 2 TIMES DAILY
Status: DISCONTINUED | OUTPATIENT
Start: 2017-01-01 | End: 2017-01-01

## 2017-01-01 RX ORDER — ACETAMINOPHEN 650 MG/1
975 SUPPOSITORY RECTAL
Status: DISCONTINUED | OUTPATIENT
Start: 2017-01-01 | End: 2017-01-01 | Stop reason: HOSPADM

## 2017-01-01 RX ORDER — AMOXICILLIN 250 MG
2 CAPSULE ORAL DAILY
Status: DISCONTINUED | OUTPATIENT
Start: 2017-01-01 | End: 2017-01-01 | Stop reason: HOSPADM

## 2017-01-01 RX ORDER — FUROSEMIDE 10 MG/ML
40 INJECTION INTRAMUSCULAR; INTRAVENOUS 2 TIMES DAILY
Status: DISCONTINUED | OUTPATIENT
Start: 2017-01-01 | End: 2017-01-01

## 2017-01-01 RX ORDER — SODIUM CHLORIDE 0.9 % (FLUSH) 0.9 %
5-10 SYRINGE (ML) INJECTION AS NEEDED
Status: DISCONTINUED | OUTPATIENT
Start: 2017-01-01 | End: 2017-01-01 | Stop reason: HOSPADM

## 2017-01-01 RX ORDER — ASPIRIN 81 MG/1
81 TABLET ORAL DAILY
Status: DISCONTINUED | OUTPATIENT
Start: 2017-01-01 | End: 2017-01-01

## 2017-01-01 RX ORDER — POTASSIUM CHLORIDE 20 MEQ/1
40 TABLET, EXTENDED RELEASE ORAL
Status: COMPLETED | OUTPATIENT
Start: 2017-01-01 | End: 2017-01-01

## 2017-01-01 RX ORDER — INSULIN LISPRO 100 [IU]/ML
INJECTION, SOLUTION INTRAVENOUS; SUBCUTANEOUS
Status: DISCONTINUED | OUTPATIENT
Start: 2017-01-01 | End: 2017-01-01 | Stop reason: HOSPADM

## 2017-01-01 RX ORDER — INSULIN GLARGINE 100 [IU]/ML
10 INJECTION, SOLUTION SUBCUTANEOUS
Status: ON HOLD | COMMUNITY
End: 2017-01-01

## 2017-01-01 RX ORDER — LISINOPRIL 5 MG/1
2.5 TABLET ORAL DAILY
Status: DISCONTINUED | OUTPATIENT
Start: 2017-01-01 | End: 2017-01-01

## 2017-01-01 RX ORDER — IBUPROFEN 200 MG
1 TABLET ORAL DAILY
Status: DISCONTINUED | OUTPATIENT
Start: 2017-01-01 | End: 2017-01-01

## 2017-01-01 RX ORDER — MORPHINE SULFATE 2 MG/ML
2 INJECTION, SOLUTION INTRAMUSCULAR; INTRAVENOUS
Status: DISCONTINUED | OUTPATIENT
Start: 2017-01-01 | End: 2017-01-01 | Stop reason: HOSPADM

## 2017-01-01 RX ORDER — IBUPROFEN 200 MG
1 TABLET ORAL DAILY
Status: DISCONTINUED | OUTPATIENT
Start: 2017-01-01 | End: 2017-01-01 | Stop reason: HOSPADM

## 2017-01-01 RX ORDER — GABAPENTIN 100 MG/1
200 CAPSULE ORAL 3 TIMES DAILY
Status: DISCONTINUED | OUTPATIENT
Start: 2017-01-01 | End: 2017-01-01

## 2017-01-01 RX ORDER — POLYETHYLENE GLYCOL 3350 17 G/17G
17 POWDER, FOR SOLUTION ORAL
Status: DISCONTINUED | OUTPATIENT
Start: 2017-01-01 | End: 2017-01-01 | Stop reason: HOSPADM

## 2017-01-01 RX ORDER — SENNOSIDES 8.6 MG/1
1 TABLET ORAL 2 TIMES DAILY
Status: DISCONTINUED | OUTPATIENT
Start: 2017-01-01 | End: 2017-01-01

## 2017-01-01 RX ORDER — FLUCONAZOLE 200 MG/1
200 TABLET ORAL DAILY
Qty: 8 TAB | Refills: 0 | Status: SHIPPED | OUTPATIENT
Start: 2017-01-01 | End: 2017-01-01

## 2017-01-01 RX ORDER — POTASSIUM CHLORIDE 20 MEQ/1
40 TABLET, EXTENDED RELEASE ORAL 2 TIMES DAILY
Status: COMPLETED | OUTPATIENT
Start: 2017-01-01 | End: 2017-01-01

## 2017-01-01 RX ORDER — CARVEDILOL 6.25 MG/1
6.25 TABLET ORAL 2 TIMES DAILY WITH MEALS
Qty: 60 TAB | Refills: 1 | Status: SHIPPED | OUTPATIENT
Start: 2017-01-01

## 2017-01-01 RX ORDER — INSULIN GLARGINE 100 [IU]/ML
35 INJECTION, SOLUTION SUBCUTANEOUS
Status: DISCONTINUED | OUTPATIENT
Start: 2017-01-01 | End: 2017-01-01 | Stop reason: HOSPADM

## 2017-01-01 RX ORDER — ALBUTEROL SULFATE 0.83 MG/ML
5 SOLUTION RESPIRATORY (INHALATION)
Status: DISCONTINUED | OUTPATIENT
Start: 2017-01-01 | End: 2017-01-01

## 2017-01-01 RX ORDER — SODIUM CHLORIDE 0.9 % (FLUSH) 0.9 %
3 SYRINGE (ML) INJECTION EVERY 12 HOURS
Status: DISCONTINUED | OUTPATIENT
Start: 2017-01-01 | End: 2017-01-01 | Stop reason: HOSPADM

## 2017-01-01 RX ORDER — FACIAL-BODY WIPES
10 EACH TOPICAL AS NEEDED
Status: DISCONTINUED | OUTPATIENT
Start: 2017-01-01 | End: 2017-01-01 | Stop reason: HOSPADM

## 2017-01-01 RX ORDER — SODIUM POLYSTYRENE SULFONATE 15 G/60ML
30 SUSPENSION ORAL; RECTAL
Status: DISPENSED | OUTPATIENT
Start: 2017-01-01 | End: 2017-01-01

## 2017-01-01 RX ORDER — DEXTROSE 50 % IN WATER (D50W) INTRAVENOUS SYRINGE
25
Status: DISCONTINUED | OUTPATIENT
Start: 2017-01-01 | End: 2017-01-01 | Stop reason: HOSPADM

## 2017-01-01 RX ORDER — NALOXONE HYDROCHLORIDE 0.4 MG/ML
0.4 INJECTION, SOLUTION INTRAMUSCULAR; INTRAVENOUS; SUBCUTANEOUS AS NEEDED
Status: DISCONTINUED | OUTPATIENT
Start: 2017-01-01 | End: 2017-01-01 | Stop reason: HOSPADM

## 2017-01-01 RX ORDER — LISINOPRIL 5 MG/1
10 TABLET ORAL DAILY
Status: DISCONTINUED | OUTPATIENT
Start: 2017-01-01 | End: 2017-01-01 | Stop reason: HOSPADM

## 2017-01-01 RX ORDER — HEPARIN SODIUM 5000 [USP'U]/ML
5000 INJECTION, SOLUTION INTRAVENOUS; SUBCUTANEOUS EVERY 8 HOURS
Status: DISCONTINUED | OUTPATIENT
Start: 2017-01-01 | End: 2017-01-01 | Stop reason: SDUPTHER

## 2017-01-01 RX ORDER — SODIUM CHLORIDE 9 MG/ML
75 INJECTION, SOLUTION INTRAVENOUS CONTINUOUS
Status: DISCONTINUED | OUTPATIENT
Start: 2017-01-01 | End: 2017-01-01 | Stop reason: HOSPADM

## 2017-01-01 RX ORDER — IBUPROFEN 200 MG
1 TABLET ORAL DAILY
Qty: 24 PATCH | Refills: 0 | Status: SHIPPED | OUTPATIENT
Start: 2017-01-01 | End: 2017-08-01

## 2017-01-01 RX ORDER — INSULIN GLARGINE 100 [IU]/ML
35 INJECTION, SOLUTION SUBCUTANEOUS
Qty: 1 VIAL | Refills: 0 | Status: SHIPPED
Start: 2017-01-01 | End: 2017-01-01

## 2017-01-01 RX ORDER — INSULIN GLARGINE 100 [IU]/ML
20 INJECTION, SOLUTION SUBCUTANEOUS
Status: DISCONTINUED | OUTPATIENT
Start: 2017-01-01 | End: 2017-01-01 | Stop reason: HOSPADM

## 2017-01-01 RX ORDER — SODIUM POLYSTYRENE SULFONATE 15 G/60ML
30 SUSPENSION ORAL; RECTAL
Status: COMPLETED | OUTPATIENT
Start: 2017-01-01 | End: 2017-01-01

## 2017-01-01 RX ORDER — INSULIN LISPRO 100 [IU]/ML
INJECTION, SOLUTION INTRAVENOUS; SUBCUTANEOUS 2 TIMES DAILY
COMMUNITY
End: 2017-01-01

## 2017-01-01 RX ORDER — LISINOPRIL 10 MG/1
10 TABLET ORAL DAILY
Qty: 30 TAB | Refills: 0 | Status: SHIPPED | OUTPATIENT
Start: 2017-01-01

## 2017-01-01 RX ORDER — GABAPENTIN 300 MG/1
600 CAPSULE ORAL 3 TIMES DAILY
Status: DISCONTINUED | OUTPATIENT
Start: 2017-01-01 | End: 2017-01-01

## 2017-01-01 RX ORDER — SODIUM POLYSTYRENE SULFONATE 15 G/60ML
30 SUSPENSION ORAL; RECTAL EVERY 6 HOURS
Status: DISCONTINUED | OUTPATIENT
Start: 2017-01-01 | End: 2017-01-01

## 2017-01-01 RX ORDER — CARVEDILOL 6.25 MG/1
6.25 TABLET ORAL 2 TIMES DAILY WITH MEALS
Status: DISCONTINUED | OUTPATIENT
Start: 2017-01-01 | End: 2017-01-01 | Stop reason: HOSPADM

## 2017-01-01 RX ORDER — ACETAMINOPHEN 325 MG/1
650 TABLET ORAL
Status: DISCONTINUED | OUTPATIENT
Start: 2017-01-01 | End: 2017-01-01

## 2017-01-01 RX ADMIN — VANCOMYCIN HYDROCHLORIDE 1000 MG: 1 INJECTION, POWDER, LYOPHILIZED, FOR SOLUTION INTRAVENOUS at 12:02

## 2017-01-01 RX ADMIN — FAMOTIDINE 20 MG: 20 TABLET ORAL at 17:15

## 2017-01-01 RX ADMIN — SODIUM CHLORIDE 600 MG: 900 INJECTION, SOLUTION INTRAVENOUS at 16:41

## 2017-01-01 RX ADMIN — Medication 10 ML: at 13:35

## 2017-01-01 RX ADMIN — STANDARDIZED SENNA CONCENTRATE AND DOCUSATE SODIUM 2 TABLET: 8.6; 5 TABLET, FILM COATED ORAL at 09:30

## 2017-01-01 RX ADMIN — Medication 3 ML: at 09:21

## 2017-01-01 RX ADMIN — HEPARIN SODIUM 5000 UNITS: 5000 INJECTION, SOLUTION INTRAVENOUS; SUBCUTANEOUS at 05:00

## 2017-01-01 RX ADMIN — HALOPERIDOL LACTATE 2 MG: 5 INJECTION, SOLUTION INTRAMUSCULAR at 06:09

## 2017-01-01 RX ADMIN — LACOSAMIDE 100 MG: 100 TABLET, FILM COATED ORAL at 20:37

## 2017-01-01 RX ADMIN — ATORVASTATIN CALCIUM 80 MG: 40 TABLET, FILM COATED ORAL at 09:05

## 2017-01-01 RX ADMIN — LACTOBACILLUS TAB 2 TABLET: TAB at 08:09

## 2017-01-01 RX ADMIN — LACOSAMIDE 100 MG: 100 TABLET, FILM COATED ORAL at 13:52

## 2017-01-01 RX ADMIN — HUMAN INSULIN 8 UNITS: 100 INJECTION, SOLUTION SUBCUTANEOUS at 07:30

## 2017-01-01 RX ADMIN — LEVETIRACETAM 500 MG: 500 TABLET, FILM COATED ORAL at 16:40

## 2017-01-01 RX ADMIN — LEVETIRACETAM 500 MG: 500 TABLET, FILM COATED ORAL at 12:25

## 2017-01-01 RX ADMIN — INSULIN GLARGINE 20 UNITS: 100 INJECTION, SOLUTION SUBCUTANEOUS at 10:16

## 2017-01-01 RX ADMIN — Medication 3 ML: at 20:34

## 2017-01-01 RX ADMIN — SENNOSIDES 8.6 MG: 8.6 TABLET, FILM COATED ORAL at 22:28

## 2017-01-01 RX ADMIN — FAMOTIDINE 20 MG: 20 TABLET ORAL at 17:09

## 2017-01-01 RX ADMIN — GABAPENTIN 600 MG: 300 CAPSULE ORAL at 21:45

## 2017-01-01 RX ADMIN — LACOSAMIDE 100 MG: 100 TABLET, FILM COATED ORAL at 08:01

## 2017-01-01 RX ADMIN — LACOSAMIDE 100 MG: 100 TABLET, FILM COATED ORAL at 09:13

## 2017-01-01 RX ADMIN — LEVETIRACETAM 500 MG: 500 TABLET ORAL at 08:25

## 2017-01-01 RX ADMIN — CARVEDILOL 3.12 MG: 3.12 TABLET, FILM COATED ORAL at 16:28

## 2017-01-01 RX ADMIN — INSULIN LISPRO 6 UNITS: 100 INJECTION, SOLUTION INTRAVENOUS; SUBCUTANEOUS at 21:29

## 2017-01-01 RX ADMIN — Medication 10 ML: at 13:17

## 2017-01-01 RX ADMIN — HALOPERIDOL LACTATE 2 MG: 5 INJECTION, SOLUTION INTRAMUSCULAR at 16:28

## 2017-01-01 RX ADMIN — PHENOBARBITAL SODIUM 65 MG: 65 INJECTION INTRAMUSCULAR; INTRAVENOUS at 21:16

## 2017-01-01 RX ADMIN — LACTOBACILLUS TAB 2 TABLET: TAB at 08:01

## 2017-01-01 RX ADMIN — CLINDAMYCIN PHOSPHATE 600 MG: 150 INJECTION, SOLUTION, CONCENTRATE INTRAVENOUS at 08:00

## 2017-01-01 RX ADMIN — HUMAN INSULIN 5 UNITS: 100 INJECTION, SOLUTION SUBCUTANEOUS at 17:38

## 2017-01-01 RX ADMIN — HYDROCODONE BITARTRATE AND ACETAMINOPHEN 1 TABLET: 5; 325 TABLET ORAL at 08:24

## 2017-01-01 RX ADMIN — PIPERACILLIN SODIUM,TAZOBACTAM SODIUM 4.5 G: 4; .5 INJECTION, POWDER, FOR SOLUTION INTRAVENOUS at 08:14

## 2017-01-01 RX ADMIN — HYDROCODONE BITARTRATE AND ACETAMINOPHEN 1 TABLET: 5; 325 TABLET ORAL at 22:16

## 2017-01-01 RX ADMIN — HUMAN INSULIN 10 UNITS: 100 INJECTION, SOLUTION SUBCUTANEOUS at 22:00

## 2017-01-01 RX ADMIN — Medication 5 ML: at 05:34

## 2017-01-01 RX ADMIN — FUROSEMIDE 40 MG: 10 INJECTION, SOLUTION INTRAMUSCULAR; INTRAVENOUS at 09:39

## 2017-01-01 RX ADMIN — PIPERACILLIN SODIUM,TAZOBACTAM SODIUM 4.5 G: 4; .5 INJECTION, POWDER, FOR SOLUTION INTRAVENOUS at 02:04

## 2017-01-01 RX ADMIN — PHENOBARBITAL SODIUM 65 MG: 65 INJECTION INTRAMUSCULAR; INTRAVENOUS at 20:32

## 2017-01-01 RX ADMIN — INSULIN GLARGINE 20 UNITS: 100 INJECTION, SOLUTION SUBCUTANEOUS at 09:02

## 2017-01-01 RX ADMIN — HEPARIN SODIUM 5000 UNITS: 5000 INJECTION, SOLUTION INTRAVENOUS; SUBCUTANEOUS at 05:01

## 2017-01-01 RX ADMIN — PIPERACILLIN SODIUM,TAZOBACTAM SODIUM 4.5 G: 4; .5 INJECTION, POWDER, FOR SOLUTION INTRAVENOUS at 17:52

## 2017-01-01 RX ADMIN — CARVEDILOL 6.25 MG: 6.25 TABLET, FILM COATED ORAL at 08:13

## 2017-01-01 RX ADMIN — HEPARIN SODIUM 5000 UNITS: 5000 INJECTION, SOLUTION INTRAVENOUS; SUBCUTANEOUS at 21:15

## 2017-01-01 RX ADMIN — LEVETIRACETAM 500 MG: 500 TABLET, FILM COATED ORAL at 22:28

## 2017-01-01 RX ADMIN — Medication 3 ML: at 09:13

## 2017-01-01 RX ADMIN — HYDROCODONE BITARTRATE AND ACETAMINOPHEN 2.5 MG: 7.5; 325 TABLET ORAL at 08:09

## 2017-01-01 RX ADMIN — HYDROMORPHONE HYDROCHLORIDE 1 MG: 1 INJECTION, SOLUTION INTRAMUSCULAR; INTRAVENOUS; SUBCUTANEOUS at 05:19

## 2017-01-01 RX ADMIN — ASPIRIN 81 MG: 81 TABLET, COATED ORAL at 09:01

## 2017-01-01 RX ADMIN — GABAPENTIN 600 MG: 300 CAPSULE ORAL at 22:10

## 2017-01-01 RX ADMIN — CARVEDILOL 6.25 MG: 6.25 TABLET, FILM COATED ORAL at 09:13

## 2017-01-01 RX ADMIN — INSULIN GLARGINE 10 UNITS: 100 INJECTION, SOLUTION SUBCUTANEOUS at 07:00

## 2017-01-01 RX ADMIN — FAMOTIDINE 20 MG: 20 TABLET ORAL at 09:12

## 2017-01-01 RX ADMIN — LEVETIRACETAM 500 MG: 500 TABLET ORAL at 08:35

## 2017-01-01 RX ADMIN — STANDARDIZED SENNA CONCENTRATE AND DOCUSATE SODIUM 2 TABLET: 8.6; 5 TABLET, FILM COATED ORAL at 09:01

## 2017-01-01 RX ADMIN — PIPERACILLIN SODIUM,TAZOBACTAM SODIUM 4.5 G: 4; .5 INJECTION, POWDER, FOR SOLUTION INTRAVENOUS at 18:04

## 2017-01-01 RX ADMIN — CEFTRIAXONE 1 G: 1 INJECTION, POWDER, FOR SOLUTION INTRAMUSCULAR; INTRAVENOUS at 17:09

## 2017-01-01 RX ADMIN — LACTOBACILLUS TAB 2 TABLET: TAB at 08:38

## 2017-01-01 RX ADMIN — PHENOBARBITAL SODIUM 65 MG: 65 INJECTION INTRAMUSCULAR; INTRAVENOUS at 09:20

## 2017-01-01 RX ADMIN — HYDROCODONE BITARTRATE AND ACETAMINOPHEN 2.5 MG: 7.5; 325 TABLET ORAL at 13:52

## 2017-01-01 RX ADMIN — FUROSEMIDE 40 MG: 40 TABLET ORAL at 09:13

## 2017-01-01 RX ADMIN — LEVETIRACETAM 500 MG: 500 TABLET, FILM COATED ORAL at 09:05

## 2017-01-01 RX ADMIN — LEVETIRACETAM 500 MG: 500 TABLET, FILM COATED ORAL at 17:47

## 2017-01-01 RX ADMIN — LEVETIRACETAM 500 MG: 500 TABLET, FILM COATED ORAL at 17:00

## 2017-01-01 RX ADMIN — Medication 3 ML: at 22:28

## 2017-01-01 RX ADMIN — HEPARIN SODIUM 5000 UNITS: 5000 INJECTION, SOLUTION INTRAVENOUS; SUBCUTANEOUS at 05:34

## 2017-01-01 RX ADMIN — INSULIN HUMAN 20 UNITS: 100 INJECTION, SOLUTION PARENTERAL at 22:09

## 2017-01-01 RX ADMIN — CARVEDILOL 6.25 MG: 6.25 TABLET, FILM COATED ORAL at 09:05

## 2017-01-01 RX ADMIN — HYDROMORPHONE HYDROCHLORIDE 1 MG: 1 INJECTION, SOLUTION INTRAMUSCULAR; INTRAVENOUS; SUBCUTANEOUS at 16:27

## 2017-01-01 RX ADMIN — HYDROMORPHONE HYDROCHLORIDE 1 MG: 1 INJECTION, SOLUTION INTRAMUSCULAR; INTRAVENOUS; SUBCUTANEOUS at 19:59

## 2017-01-01 RX ADMIN — HALOPERIDOL LACTATE 2 MG: 5 INJECTION, SOLUTION INTRAMUSCULAR at 21:36

## 2017-01-01 RX ADMIN — PIPERACILLIN SODIUM,TAZOBACTAM SODIUM 4.5 G: 4; .5 INJECTION, POWDER, FOR SOLUTION INTRAVENOUS at 01:13

## 2017-01-01 RX ADMIN — CEFTRIAXONE SODIUM 1 G: 1 INJECTION, POWDER, FOR SOLUTION INTRAMUSCULAR; INTRAVENOUS at 11:09

## 2017-01-01 RX ADMIN — GABAPENTIN 600 MG: 300 CAPSULE ORAL at 08:01

## 2017-01-01 RX ADMIN — HEPARIN SODIUM 5000 UNITS: 5000 INJECTION, SOLUTION INTRAVENOUS; SUBCUTANEOUS at 15:35

## 2017-01-01 RX ADMIN — HEPARIN SODIUM 5000 UNITS: 5000 INJECTION, SOLUTION INTRAVENOUS; SUBCUTANEOUS at 14:41

## 2017-01-01 RX ADMIN — HALOPERIDOL LACTATE 2 MG: 5 INJECTION, SOLUTION INTRAMUSCULAR at 13:39

## 2017-01-01 RX ADMIN — STANDARDIZED SENNA CONCENTRATE AND DOCUSATE SODIUM 2 TABLET: 8.6; 5 TABLET, FILM COATED ORAL at 09:37

## 2017-01-01 RX ADMIN — LACOSAMIDE 100 MG: 100 TABLET, FILM COATED ORAL at 21:30

## 2017-01-01 RX ADMIN — HYDROMORPHONE HYDROCHLORIDE 1 MG: 1 INJECTION, SOLUTION INTRAMUSCULAR; INTRAVENOUS; SUBCUTANEOUS at 00:07

## 2017-01-01 RX ADMIN — GABAPENTIN 600 MG: 300 CAPSULE ORAL at 17:18

## 2017-01-01 RX ADMIN — LEVETIRACETAM 500 MG: 500 TABLET ORAL at 17:16

## 2017-01-01 RX ADMIN — GADOBENATE DIMEGLUMINE 10 ML: 529 INJECTION, SOLUTION INTRAVENOUS at 13:17

## 2017-01-01 RX ADMIN — FUROSEMIDE 40 MG: 10 INJECTION, SOLUTION INTRAMUSCULAR; INTRAVENOUS at 09:30

## 2017-01-01 RX ADMIN — HALOPERIDOL LACTATE 2 MG: 5 INJECTION, SOLUTION INTRAMUSCULAR at 01:18

## 2017-01-01 RX ADMIN — POTASSIUM CHLORIDE 40 MEQ: 1500 TABLET, EXTENDED RELEASE ORAL at 15:45

## 2017-01-01 RX ADMIN — FAMOTIDINE 20 MG: 20 TABLET ORAL at 09:05

## 2017-01-01 RX ADMIN — HEPARIN SODIUM 5000 UNITS: 5000 INJECTION, SOLUTION INTRAVENOUS; SUBCUTANEOUS at 22:07

## 2017-01-01 RX ADMIN — Medication 5 ML: at 13:14

## 2017-01-01 RX ADMIN — LACOSAMIDE 100 MG: 100 TABLET, FILM COATED ORAL at 21:15

## 2017-01-01 RX ADMIN — HEPARIN SODIUM 5000 UNITS: 5000 INJECTION, SOLUTION INTRAVENOUS; SUBCUTANEOUS at 21:17

## 2017-01-01 RX ADMIN — Medication 10 ML: at 05:01

## 2017-01-01 RX ADMIN — ASPIRIN 81 MG: 81 TABLET, COATED ORAL at 08:13

## 2017-01-01 RX ADMIN — Medication 10 ML: at 13:20

## 2017-01-01 RX ADMIN — HEPARIN SODIUM 5000 UNITS: 5000 INJECTION, SOLUTION INTRAVENOUS; SUBCUTANEOUS at 21:45

## 2017-01-01 RX ADMIN — LISINOPRIL 2.5 MG: 5 TABLET ORAL at 12:06

## 2017-01-01 RX ADMIN — LISINOPRIL 10 MG: 5 TABLET ORAL at 09:00

## 2017-01-01 RX ADMIN — SODIUM CHLORIDE 75 ML/HR: 900 INJECTION, SOLUTION INTRAVENOUS at 05:35

## 2017-01-01 RX ADMIN — Medication 3 ML: at 09:53

## 2017-01-01 RX ADMIN — HYDROCODONE BITARTRATE AND ACETAMINOPHEN 1 TABLET: 5; 325 TABLET ORAL at 17:22

## 2017-01-01 RX ADMIN — VANCOMYCIN HYDROCHLORIDE 1500 MG: 10 INJECTION, POWDER, LYOPHILIZED, FOR SOLUTION INTRAVENOUS at 16:24

## 2017-01-01 RX ADMIN — LACTOBACILLUS TAB 2 TABLET: TAB at 21:02

## 2017-01-01 RX ADMIN — LEVETIRACETAM 500 MG: 500 TABLET ORAL at 09:05

## 2017-01-01 RX ADMIN — ALBUTEROL SULFATE 2.5 MG: 2.5 SOLUTION RESPIRATORY (INHALATION) at 11:17

## 2017-01-01 RX ADMIN — HUMAN INSULIN 8 UNITS: 100 INJECTION, SOLUTION SUBCUTANEOUS at 16:30

## 2017-01-01 RX ADMIN — Medication 5 ML: at 22:00

## 2017-01-01 RX ADMIN — HYDROMORPHONE HYDROCHLORIDE 2 MG: 2 TABLET ORAL at 22:34

## 2017-01-01 RX ADMIN — FOLIC ACID: 5 INJECTION, SOLUTION INTRAMUSCULAR; INTRAVENOUS; SUBCUTANEOUS at 09:03

## 2017-01-01 RX ADMIN — ATORVASTATIN CALCIUM 80 MG: 40 TABLET, FILM COATED ORAL at 08:13

## 2017-01-01 RX ADMIN — Medication 10 ML: at 21:33

## 2017-01-01 RX ADMIN — CLINDAMYCIN PHOSPHATE 600 MG: 150 INJECTION, SOLUTION, CONCENTRATE INTRAVENOUS at 01:18

## 2017-01-01 RX ADMIN — LEVETIRACETAM 500 MG: 500 TABLET, FILM COATED ORAL at 18:05

## 2017-01-01 RX ADMIN — FAMOTIDINE 20 MG: 20 TABLET ORAL at 08:24

## 2017-01-01 RX ADMIN — LACOSAMIDE 100 MG: 100 TABLET, FILM COATED ORAL at 12:25

## 2017-01-01 RX ADMIN — INSULIN LISPRO 3 UNITS: 100 INJECTION, SOLUTION INTRAVENOUS; SUBCUTANEOUS at 22:30

## 2017-01-01 RX ADMIN — FUROSEMIDE 40 MG: 10 INJECTION, SOLUTION INTRAMUSCULAR; INTRAVENOUS at 18:01

## 2017-01-01 RX ADMIN — Medication 10 ML: at 22:11

## 2017-01-01 RX ADMIN — LACOSAMIDE 100 MG: 100 TABLET, FILM COATED ORAL at 20:54

## 2017-01-01 RX ADMIN — SODIUM CHLORIDE 1000 ML: 900 INJECTION, SOLUTION INTRAVENOUS at 10:16

## 2017-01-01 RX ADMIN — HYDROCODONE BITARTRATE AND ACETAMINOPHEN 1 TABLET: 5; 325 TABLET ORAL at 21:02

## 2017-01-01 RX ADMIN — HYDROMORPHONE HYDROCHLORIDE 1 MG: 1 INJECTION, SOLUTION INTRAMUSCULAR; INTRAVENOUS; SUBCUTANEOUS at 21:17

## 2017-01-01 RX ADMIN — LACTOBACILLUS TAB 2 TABLET: TAB at 17:00

## 2017-01-01 RX ADMIN — LACOSAMIDE 100 MG: 100 TABLET, FILM COATED ORAL at 22:13

## 2017-01-01 RX ADMIN — HEPARIN SODIUM 5000 UNITS: 5000 INJECTION, SOLUTION INTRAVENOUS; SUBCUTANEOUS at 05:36

## 2017-01-01 RX ADMIN — Medication 10 ML: at 13:28

## 2017-01-01 RX ADMIN — ATORVASTATIN CALCIUM 80 MG: 40 TABLET, FILM COATED ORAL at 04:00

## 2017-01-01 RX ADMIN — HEPARIN SODIUM 5000 UNITS: 5000 INJECTION, SOLUTION INTRAVENOUS; SUBCUTANEOUS at 23:00

## 2017-01-01 RX ADMIN — PREDNISONE 50 MG: 50 TABLET ORAL at 09:56

## 2017-01-01 RX ADMIN — HYDROCODONE BITARTRATE AND ACETAMINOPHEN 1 TABLET: 10; 325 TABLET ORAL at 04:57

## 2017-01-01 RX ADMIN — HALOPERIDOL LACTATE 2 MG: 5 INJECTION, SOLUTION INTRAMUSCULAR at 00:08

## 2017-01-01 RX ADMIN — Medication 10 ML: at 16:24

## 2017-01-01 RX ADMIN — PIPERACILLIN SODIUM,TAZOBACTAM SODIUM 4.5 G: 4; .5 INJECTION, POWDER, FOR SOLUTION INTRAVENOUS at 09:29

## 2017-01-01 RX ADMIN — GABAPENTIN 600 MG: 300 CAPSULE ORAL at 15:06

## 2017-01-01 RX ADMIN — SODIUM CHLORIDE 75 ML/HR: 900 INJECTION, SOLUTION INTRAVENOUS at 10:24

## 2017-01-01 RX ADMIN — FAMOTIDINE 20 MG: 10 INJECTION INTRAVENOUS at 10:00

## 2017-01-01 RX ADMIN — Medication 10 ML: at 22:48

## 2017-01-01 RX ADMIN — CALCIUM GLUCONATE 1 G: 94 INJECTION, SOLUTION INTRAVENOUS at 12:16

## 2017-01-01 RX ADMIN — INSULIN LISPRO 2 UNITS: 100 INJECTION, SOLUTION INTRAVENOUS; SUBCUTANEOUS at 16:18

## 2017-01-01 RX ADMIN — INSULIN LISPRO 5 UNITS: 100 INJECTION, SOLUTION INTRAVENOUS; SUBCUTANEOUS at 23:30

## 2017-01-01 RX ADMIN — Medication 10 ML: at 22:00

## 2017-01-01 RX ADMIN — LEVETIRACETAM 500 MG: 500 TABLET, FILM COATED ORAL at 08:12

## 2017-01-01 RX ADMIN — CLINDAMYCIN PHOSPHATE 600 MG: 150 INJECTION, SOLUTION, CONCENTRATE INTRAVENOUS at 17:00

## 2017-01-01 RX ADMIN — PHENOBARBITAL SODIUM 65 MG: 65 INJECTION INTRAMUSCULAR; INTRAVENOUS at 20:34

## 2017-01-01 RX ADMIN — HYDROCODONE BITARTRATE AND ACETAMINOPHEN 1 TABLET: 5; 325 TABLET ORAL at 00:54

## 2017-01-01 RX ADMIN — ASPIRIN 81 MG: 81 TABLET, COATED ORAL at 09:30

## 2017-01-01 RX ADMIN — HYDROMORPHONE HYDROCHLORIDE 1 MG: 1 INJECTION, SOLUTION INTRAMUSCULAR; INTRAVENOUS; SUBCUTANEOUS at 11:01

## 2017-01-01 RX ADMIN — VANCOMYCIN HYDROCHLORIDE 1000 MG: 1 INJECTION, POWDER, LYOPHILIZED, FOR SOLUTION INTRAVENOUS at 12:35

## 2017-01-01 RX ADMIN — INSULIN LISPRO 2 UNITS: 100 INJECTION, SOLUTION INTRAVENOUS; SUBCUTANEOUS at 12:01

## 2017-01-01 RX ADMIN — PREDNISONE 50 MG: 50 TABLET ORAL at 01:55

## 2017-01-01 RX ADMIN — LACOSAMIDE 100 MG: 100 TABLET, FILM COATED ORAL at 09:41

## 2017-01-01 RX ADMIN — CEFTRIAXONE 1 G: 1 INJECTION, POWDER, FOR SOLUTION INTRAMUSCULAR; INTRAVENOUS at 18:00

## 2017-01-01 RX ADMIN — LACOSAMIDE 100 MG: 100 TABLET, FILM COATED ORAL at 21:36

## 2017-01-01 RX ADMIN — LACTOBACILLUS TAB 2 TABLET: TAB at 16:40

## 2017-01-01 RX ADMIN — LACOSAMIDE 100 MG: 100 TABLET, FILM COATED ORAL at 17:18

## 2017-01-01 RX ADMIN — HEPARIN SODIUM 5000 UNITS: 5000 INJECTION, SOLUTION INTRAVENOUS; SUBCUTANEOUS at 22:49

## 2017-01-01 RX ADMIN — FAMOTIDINE 20 MG: 10 INJECTION INTRAVENOUS at 09:39

## 2017-01-01 RX ADMIN — HYDROMORPHONE HYDROCHLORIDE 0.5 MG: 1 INJECTION, SOLUTION INTRAMUSCULAR; INTRAVENOUS; SUBCUTANEOUS at 05:57

## 2017-01-01 RX ADMIN — FAMOTIDINE 20 MG: 20 TABLET ORAL at 18:00

## 2017-01-01 RX ADMIN — GABAPENTIN 600 MG: 300 CAPSULE ORAL at 21:15

## 2017-01-01 RX ADMIN — PIPERACILLIN SODIUM,TAZOBACTAM SODIUM 4.5 G: 4; .5 INJECTION, POWDER, FOR SOLUTION INTRAVENOUS at 09:42

## 2017-01-01 RX ADMIN — CLINDAMYCIN PHOSPHATE 600 MG: 150 INJECTION, SOLUTION, CONCENTRATE INTRAVENOUS at 16:59

## 2017-01-01 RX ADMIN — LEVETIRACETAM 500 MG: 500 TABLET, FILM COATED ORAL at 09:00

## 2017-01-01 RX ADMIN — LACOSAMIDE 100 MG: 100 TABLET, FILM COATED ORAL at 17:38

## 2017-01-01 RX ADMIN — STANDARDIZED SENNA CONCENTRATE AND DOCUSATE SODIUM 2 TABLET: 8.6; 5 TABLET, FILM COATED ORAL at 15:50

## 2017-01-01 RX ADMIN — DEXTROSE MONOHYDRATE 25 G: 25 INJECTION, SOLUTION INTRAVENOUS at 22:39

## 2017-01-01 RX ADMIN — FUROSEMIDE 40 MG: 10 INJECTION, SOLUTION INTRAMUSCULAR; INTRAVENOUS at 17:16

## 2017-01-01 RX ADMIN — Medication 3 ML: at 21:17

## 2017-01-01 RX ADMIN — Medication 5 ML: at 05:26

## 2017-01-01 RX ADMIN — INSULIN LISPRO 8 UNITS: 100 INJECTION, SOLUTION INTRAVENOUS; SUBCUTANEOUS at 07:48

## 2017-01-01 RX ADMIN — FOLIC ACID: 5 INJECTION, SOLUTION INTRAMUSCULAR; INTRAVENOUS; SUBCUTANEOUS at 09:29

## 2017-01-01 RX ADMIN — FAMOTIDINE 20 MG: 20 TABLET ORAL at 18:04

## 2017-01-01 RX ADMIN — ASPIRIN 81 MG: 81 TABLET, COATED ORAL at 09:38

## 2017-01-01 RX ADMIN — DEXTROSE MONOHYDRATE 25 G: 25 INJECTION, SOLUTION INTRAVENOUS at 02:46

## 2017-01-01 RX ADMIN — LEVETIRACETAM 250 MG: 250 TABLET, FILM COATED ORAL at 09:30

## 2017-01-01 RX ADMIN — INSULIN LISPRO 2 UNITS: 100 INJECTION, SOLUTION INTRAVENOUS; SUBCUTANEOUS at 00:41

## 2017-01-01 RX ADMIN — INSULIN GLARGINE 20 UNITS: 100 INJECTION, SOLUTION SUBCUTANEOUS at 09:29

## 2017-01-01 RX ADMIN — PHENOBARBITAL SODIUM 65 MG: 65 INJECTION INTRAMUSCULAR; INTRAVENOUS at 09:52

## 2017-01-01 RX ADMIN — CARVEDILOL 6.25 MG: 6.25 TABLET, FILM COATED ORAL at 17:14

## 2017-01-01 RX ADMIN — HUMAN INSULIN 6 UNITS: 100 INJECTION, SOLUTION SUBCUTANEOUS at 07:30

## 2017-01-01 RX ADMIN — INSULIN LISPRO 2 UNITS: 100 INJECTION, SOLUTION INTRAVENOUS; SUBCUTANEOUS at 13:10

## 2017-01-01 RX ADMIN — HEPARIN SODIUM 5000 UNITS: 5000 INJECTION, SOLUTION INTRAVENOUS; SUBCUTANEOUS at 06:36

## 2017-01-01 RX ADMIN — HUMAN INSULIN 10 UNITS: 100 INJECTION, SOLUTION SUBCUTANEOUS at 16:30

## 2017-01-01 RX ADMIN — PIPERACILLIN SODIUM,TAZOBACTAM SODIUM 4.5 G: 4; .5 INJECTION, POWDER, FOR SOLUTION INTRAVENOUS at 09:11

## 2017-01-01 RX ADMIN — CLINDAMYCIN PHOSPHATE 600 MG: 150 INJECTION, SOLUTION, CONCENTRATE INTRAVENOUS at 08:38

## 2017-01-01 RX ADMIN — HYDROCODONE BITARTRATE AND ACETAMINOPHEN 1 TABLET: 5; 325 TABLET ORAL at 21:15

## 2017-01-01 RX ADMIN — TUBERCULIN PURIFIED PROTEIN DERIVATIVE 5 UNITS: 5 INJECTION, SOLUTION INTRADERMAL at 19:00

## 2017-01-01 RX ADMIN — GABAPENTIN 600 MG: 300 CAPSULE ORAL at 15:53

## 2017-01-01 RX ADMIN — HEPARIN SODIUM 5000 UNITS: 5000 INJECTION, SOLUTION INTRAVENOUS; SUBCUTANEOUS at 14:32

## 2017-01-01 RX ADMIN — LISINOPRIL 10 MG: 5 TABLET ORAL at 09:30

## 2017-01-01 RX ADMIN — HYDROMORPHONE HYDROCHLORIDE 1 MG: 1 INJECTION, SOLUTION INTRAMUSCULAR; INTRAVENOUS; SUBCUTANEOUS at 18:10

## 2017-01-01 RX ADMIN — HYDROMORPHONE HYDROCHLORIDE 2 MG: 2 TABLET ORAL at 05:24

## 2017-01-01 RX ADMIN — INSULIN LISPRO 4 UNITS: 100 INJECTION, SOLUTION INTRAVENOUS; SUBCUTANEOUS at 12:01

## 2017-01-01 RX ADMIN — Medication 10 ML: at 14:26

## 2017-01-01 RX ADMIN — FUROSEMIDE 40 MG: 10 INJECTION, SOLUTION INTRAMUSCULAR; INTRAVENOUS at 09:59

## 2017-01-01 RX ADMIN — PIPERACILLIN SODIUM,TAZOBACTAM SODIUM 4.5 G: 4; .5 INJECTION, POWDER, FOR SOLUTION INTRAVENOUS at 01:48

## 2017-01-01 RX ADMIN — Medication 5 ML: at 21:26

## 2017-01-01 RX ADMIN — Medication 10 ML: at 15:51

## 2017-01-01 RX ADMIN — INSULIN GLARGINE 20 UNITS: 100 INJECTION, SOLUTION SUBCUTANEOUS at 08:18

## 2017-01-01 RX ADMIN — LACOSAMIDE 100 MG: 100 TABLET, FILM COATED ORAL at 08:12

## 2017-01-01 RX ADMIN — LEVETIRACETAM 500 MG: 500 TABLET, FILM COATED ORAL at 08:08

## 2017-01-01 RX ADMIN — HUMAN INSULIN 4 UNITS: 100 INJECTION, SOLUTION SUBCUTANEOUS at 22:11

## 2017-01-01 RX ADMIN — Medication 10 ML: at 21:47

## 2017-01-01 RX ADMIN — SODIUM CHLORIDE, PRESERVATIVE FREE 3 ML: 5 INJECTION INTRAVENOUS at 11:01

## 2017-01-01 RX ADMIN — Medication 3 ML: at 20:00

## 2017-01-01 RX ADMIN — SODIUM POLYSTYRENE SULFONATE 30 G: 15 SUSPENSION ORAL; RECTAL at 10:19

## 2017-01-01 RX ADMIN — FAMOTIDINE 20 MG: 10 INJECTION INTRAVENOUS at 09:29

## 2017-01-01 RX ADMIN — LEVETIRACETAM 500 MG: 500 TABLET ORAL at 21:15

## 2017-01-01 RX ADMIN — HYDROMORPHONE HYDROCHLORIDE 2 MG: 2 TABLET ORAL at 22:22

## 2017-01-01 RX ADMIN — FAMOTIDINE 20 MG: 20 TABLET ORAL at 12:25

## 2017-01-01 RX ADMIN — INSULIN GLARGINE 35 UNITS: 100 INJECTION, SOLUTION SUBCUTANEOUS at 21:45

## 2017-01-01 RX ADMIN — PIPERACILLIN SODIUM,TAZOBACTAM SODIUM 4.5 G: 4; .5 INJECTION, POWDER, FOR SOLUTION INTRAVENOUS at 09:52

## 2017-01-01 RX ADMIN — ATORVASTATIN CALCIUM 80 MG: 40 TABLET, FILM COATED ORAL at 08:12

## 2017-01-01 RX ADMIN — INSULIN GLARGINE 35 UNITS: 100 INJECTION, SOLUTION SUBCUTANEOUS at 22:10

## 2017-01-01 RX ADMIN — GABAPENTIN 600 MG: 300 CAPSULE ORAL at 08:23

## 2017-01-01 RX ADMIN — LEVETIRACETAM 500 MG: 500 TABLET, FILM COATED ORAL at 13:52

## 2017-01-01 RX ADMIN — CEFTRIAXONE SODIUM 1 G: 1 INJECTION, POWDER, FOR SOLUTION INTRAMUSCULAR; INTRAVENOUS at 18:13

## 2017-01-01 RX ADMIN — CARVEDILOL 6.25 MG: 6.25 TABLET, FILM COATED ORAL at 08:24

## 2017-01-01 RX ADMIN — POTASSIUM CHLORIDE 40 MEQ: 20 TABLET, EXTENDED RELEASE ORAL at 08:38

## 2017-01-01 RX ADMIN — HUMAN INSULIN 8 UNITS: 100 INJECTION, SOLUTION SUBCUTANEOUS at 11:30

## 2017-01-01 RX ADMIN — LEVETIRACETAM 250 MG: 250 TABLET, FILM COATED ORAL at 09:12

## 2017-01-01 RX ADMIN — PREDNISONE 50 MG: 50 TABLET ORAL at 21:16

## 2017-01-01 RX ADMIN — CARVEDILOL 6.25 MG: 6.25 TABLET, FILM COATED ORAL at 17:16

## 2017-01-01 RX ADMIN — Medication 10 ML: at 20:42

## 2017-01-01 RX ADMIN — Medication 10 ML: at 15:50

## 2017-01-01 RX ADMIN — CLINDAMYCIN PHOSPHATE 600 MG: 150 INJECTION, SOLUTION, CONCENTRATE INTRAVENOUS at 01:56

## 2017-01-01 RX ADMIN — Medication 3 ML: at 09:00

## 2017-01-01 RX ADMIN — CLINDAMYCIN PHOSPHATE 600 MG: 150 INJECTION, SOLUTION, CONCENTRATE INTRAVENOUS at 02:14

## 2017-01-01 RX ADMIN — HYDROMORPHONE HYDROCHLORIDE 1 MG: 1 INJECTION, SOLUTION INTRAMUSCULAR; INTRAVENOUS; SUBCUTANEOUS at 08:15

## 2017-01-01 RX ADMIN — HYDROMORPHONE HYDROCHLORIDE 0.5 MG: 1 INJECTION, SOLUTION INTRAMUSCULAR; INTRAVENOUS; SUBCUTANEOUS at 14:58

## 2017-01-01 RX ADMIN — HYDROCODONE BITARTRATE AND ACETAMINOPHEN 1 TABLET: 5; 325 TABLET ORAL at 03:15

## 2017-01-01 RX ADMIN — ASPIRIN 81 MG: 81 TABLET, COATED ORAL at 15:49

## 2017-01-01 RX ADMIN — Medication 3 ML: at 20:53

## 2017-01-01 RX ADMIN — POTASSIUM CHLORIDE 40 MEQ: 20 TABLET, EXTENDED RELEASE ORAL at 21:02

## 2017-01-01 RX ADMIN — INSULIN LISPRO 2 UNITS: 100 INJECTION, SOLUTION INTRAVENOUS; SUBCUTANEOUS at 17:12

## 2017-01-01 RX ADMIN — GABAPENTIN 600 MG: 300 CAPSULE ORAL at 09:05

## 2017-01-01 RX ADMIN — Medication 10 ML: at 05:29

## 2017-01-01 RX ADMIN — FOLIC ACID: 5 INJECTION, SOLUTION INTRAMUSCULAR; INTRAVENOUS; SUBCUTANEOUS at 12:23

## 2017-01-01 RX ADMIN — Medication 5 ML: at 05:38

## 2017-01-01 RX ADMIN — HYDROCODONE BITARTRATE AND ACETAMINOPHEN 1 TABLET: 5; 325 TABLET ORAL at 23:07

## 2017-01-01 RX ADMIN — PIPERACILLIN SODIUM,TAZOBACTAM SODIUM 4.5 G: 4; .5 INJECTION, POWDER, FOR SOLUTION INTRAVENOUS at 17:16

## 2017-01-01 RX ADMIN — FAMOTIDINE 20 MG: 20 TABLET ORAL at 08:12

## 2017-01-01 RX ADMIN — HYDROCODONE BITARTRATE AND ACETAMINOPHEN 1 TABLET: 5; 325 TABLET ORAL at 15:47

## 2017-01-01 RX ADMIN — GABAPENTIN 600 MG: 300 CAPSULE ORAL at 23:00

## 2017-01-01 RX ADMIN — LACOSAMIDE 100 MG: 100 TABLET, FILM COATED ORAL at 09:30

## 2017-01-01 RX ADMIN — INSULIN GLARGINE 35 UNITS: 100 INJECTION, SOLUTION SUBCUTANEOUS at 22:46

## 2017-01-01 RX ADMIN — LACOSAMIDE 100 MG: 100 TABLET, FILM COATED ORAL at 17:15

## 2017-01-01 RX ADMIN — LACOSAMIDE 100 MG: 100 TABLET, FILM COATED ORAL at 09:05

## 2017-01-01 RX ADMIN — SPIRONOLACTONE 25 MG: 25 TABLET, FILM COATED ORAL at 09:12

## 2017-01-01 RX ADMIN — TUBERCULIN PURIFIED PROTEIN DERIVATIVE 5 UNITS: 5 INJECTION INTRADERMAL at 21:23

## 2017-01-01 RX ADMIN — CARVEDILOL 6.25 MG: 6.25 TABLET, FILM COATED ORAL at 18:00

## 2017-01-01 RX ADMIN — PIPERACILLIN SODIUM,TAZOBACTAM SODIUM 3.38 G: 3; .375 INJECTION, POWDER, FOR SOLUTION INTRAVENOUS at 15:35

## 2017-01-01 RX ADMIN — ASPIRIN 81 MG: 81 TABLET, COATED ORAL at 08:24

## 2017-01-01 RX ADMIN — LEVETIRACETAM 500 MG: 500 TABLET, FILM COATED ORAL at 17:45

## 2017-01-01 RX ADMIN — LACOSAMIDE 100 MG: 100 TABLET, FILM COATED ORAL at 16:41

## 2017-01-01 RX ADMIN — Medication 10 ML: at 05:40

## 2017-01-01 RX ADMIN — CARVEDILOL 6.25 MG: 6.25 TABLET, FILM COATED ORAL at 16:04

## 2017-01-01 RX ADMIN — HEPARIN SODIUM 5000 UNITS: 5000 INJECTION, SOLUTION INTRAVENOUS; SUBCUTANEOUS at 05:25

## 2017-01-01 RX ADMIN — ASPIRIN 81 MG: 81 TABLET, COATED ORAL at 09:12

## 2017-01-01 RX ADMIN — INSULIN LISPRO 8 UNITS: 100 INJECTION, SOLUTION INTRAVENOUS; SUBCUTANEOUS at 22:26

## 2017-01-01 RX ADMIN — INSULIN LISPRO 2 UNITS: 100 INJECTION, SOLUTION INTRAVENOUS; SUBCUTANEOUS at 12:06

## 2017-01-01 RX ADMIN — VANCOMYCIN HYDROCHLORIDE 1000 MG: 1 INJECTION, POWDER, LYOPHILIZED, FOR SOLUTION INTRAVENOUS at 13:32

## 2017-01-01 RX ADMIN — Medication 10 ML: at 06:00

## 2017-01-01 RX ADMIN — INSULIN GLARGINE 10 UNITS: 100 INJECTION, SOLUTION SUBCUTANEOUS at 11:02

## 2017-01-01 RX ADMIN — STANDARDIZED SENNA CONCENTRATE AND DOCUSATE SODIUM 2 TABLET: 8.6; 5 TABLET, FILM COATED ORAL at 08:12

## 2017-01-01 RX ADMIN — SPIRONOLACTONE 25 MG: 25 TABLET, FILM COATED ORAL at 09:30

## 2017-01-01 RX ADMIN — Medication 5 ML: at 12:03

## 2017-01-01 RX ADMIN — SODIUM CHLORIDE 75 ML/HR: 900 INJECTION, SOLUTION INTRAVENOUS at 08:43

## 2017-01-01 RX ADMIN — LACOSAMIDE 100 MG: 100 TABLET, FILM COATED ORAL at 08:09

## 2017-01-01 RX ADMIN — HEPARIN SODIUM 5000 UNITS: 5000 INJECTION, SOLUTION INTRAVENOUS; SUBCUTANEOUS at 12:24

## 2017-01-01 RX ADMIN — HYDROCODONE BITARTRATE AND ACETAMINOPHEN 1 TABLET: 5; 325 TABLET ORAL at 22:48

## 2017-01-01 RX ADMIN — DEXTROSE MONOHYDRATE 70 ML/HR: 10 INJECTION, SOLUTION INTRAVENOUS at 03:20

## 2017-01-01 RX ADMIN — CEFTRIAXONE SODIUM 1 G: 1 INJECTION, POWDER, FOR SOLUTION INTRAMUSCULAR; INTRAVENOUS at 13:51

## 2017-01-01 RX ADMIN — HEPARIN SODIUM 5000 UNITS: 5000 INJECTION, SOLUTION INTRAVENOUS; SUBCUTANEOUS at 05:37

## 2017-01-01 RX ADMIN — SENNOSIDES 8.6 MG: 8.6 TABLET, FILM COATED ORAL at 09:05

## 2017-01-01 RX ADMIN — Medication: at 07:00

## 2017-01-01 RX ADMIN — PIPERACILLIN SODIUM,TAZOBACTAM SODIUM 4.5 G: 4; .5 INJECTION, POWDER, FOR SOLUTION INTRAVENOUS at 17:48

## 2017-01-01 RX ADMIN — SODIUM CHLORIDE 75 ML/HR: 900 INJECTION, SOLUTION INTRAVENOUS at 21:13

## 2017-01-01 RX ADMIN — HYDROMORPHONE HYDROCHLORIDE 1 MG: 1 INJECTION, SOLUTION INTRAMUSCULAR; INTRAVENOUS; SUBCUTANEOUS at 15:09

## 2017-01-01 RX ADMIN — GABAPENTIN 600 MG: 300 CAPSULE ORAL at 08:35

## 2017-01-01 RX ADMIN — HUMAN INSULIN 10 UNITS: 100 INJECTION, SOLUTION SUBCUTANEOUS at 22:09

## 2017-01-01 RX ADMIN — DIPHENHYDRAMINE HYDROCHLORIDE 25 MG: 50 INJECTION, SOLUTION INTRAMUSCULAR; INTRAVENOUS at 00:07

## 2017-01-01 RX ADMIN — HYDROMORPHONE HYDROCHLORIDE 0.5 MG: 1 INJECTION, SOLUTION INTRAMUSCULAR; INTRAVENOUS; SUBCUTANEOUS at 18:34

## 2017-01-01 RX ADMIN — INSULIN LISPRO 1 UNITS: 100 INJECTION, SOLUTION INTRAVENOUS; SUBCUTANEOUS at 12:25

## 2017-01-01 RX ADMIN — ATORVASTATIN CALCIUM 80 MG: 40 TABLET, FILM COATED ORAL at 08:23

## 2017-01-01 RX ADMIN — HYDROMORPHONE HYDROCHLORIDE 1 MG: 1 INJECTION, SOLUTION INTRAMUSCULAR; INTRAVENOUS; SUBCUTANEOUS at 05:12

## 2017-01-01 RX ADMIN — GABAPENTIN 600 MG: 300 CAPSULE ORAL at 17:09

## 2017-01-01 RX ADMIN — PIPERACILLIN SODIUM,TAZOBACTAM SODIUM 4.5 G: 4; .5 INJECTION, POWDER, FOR SOLUTION INTRAVENOUS at 02:19

## 2017-01-01 RX ADMIN — LACOSAMIDE 100 MG: 100 TABLET, FILM COATED ORAL at 09:00

## 2017-01-01 RX ADMIN — GABAPENTIN 600 MG: 300 CAPSULE ORAL at 08:13

## 2017-01-01 RX ADMIN — LISINOPRIL 10 MG: 5 TABLET ORAL at 09:12

## 2017-01-01 RX ADMIN — DIPHENHYDRAMINE HYDROCHLORIDE 25 MG: 50 INJECTION, SOLUTION INTRAMUSCULAR; INTRAVENOUS at 14:59

## 2017-01-01 RX ADMIN — FAMOTIDINE 20 MG: 20 TABLET ORAL at 21:15

## 2017-01-01 RX ADMIN — Medication 10 ML: at 21:37

## 2017-01-01 RX ADMIN — CLINDAMYCIN PHOSPHATE 600 MG: 150 INJECTION, SOLUTION, CONCENTRATE INTRAVENOUS at 08:08

## 2017-01-01 RX ADMIN — LACOSAMIDE 100 MG: 100 TABLET, FILM COATED ORAL at 18:00

## 2017-01-01 RX ADMIN — LEVETIRACETAM 500 MG: 500 TABLET ORAL at 18:00

## 2017-01-01 RX ADMIN — HYDROMORPHONE HYDROCHLORIDE 2 MG: 2 TABLET ORAL at 09:06

## 2017-01-01 RX ADMIN — INSULIN GLARGINE 20 UNITS: 100 INJECTION, SOLUTION SUBCUTANEOUS at 07:51

## 2017-01-01 RX ADMIN — INSULIN LISPRO 2 UNITS: 100 INJECTION, SOLUTION INTRAVENOUS; SUBCUTANEOUS at 17:16

## 2017-01-01 RX ADMIN — CARVEDILOL 3.12 MG: 3.12 TABLET, FILM COATED ORAL at 09:38

## 2017-01-01 RX ADMIN — LACOSAMIDE 100 MG: 100 TABLET, FILM COATED ORAL at 08:35

## 2017-01-01 RX ADMIN — HYDROCODONE BITARTRATE AND ACETAMINOPHEN 1 TABLET: 5; 325 TABLET ORAL at 16:20

## 2017-01-01 RX ADMIN — SENNOSIDES 8.6 MG: 8.6 TABLET, FILM COATED ORAL at 17:45

## 2017-01-01 RX ADMIN — GABAPENTIN 600 MG: 300 CAPSULE ORAL at 22:16

## 2017-01-01 RX ADMIN — CARVEDILOL 6.25 MG: 6.25 TABLET, FILM COATED ORAL at 09:00

## 2017-01-01 RX ADMIN — FLUCONAZOLE 200 MG: 2 INJECTION INTRAVENOUS at 09:30

## 2017-01-01 RX ADMIN — HYDROMORPHONE HYDROCHLORIDE 1 MG: 1 INJECTION, SOLUTION INTRAMUSCULAR; INTRAVENOUS; SUBCUTANEOUS at 09:53

## 2017-01-01 RX ADMIN — HALOPERIDOL LACTATE 2 MG: 5 INJECTION, SOLUTION INTRAMUSCULAR at 11:00

## 2017-01-01 RX ADMIN — LACOSAMIDE 100 MG: 100 TABLET, FILM COATED ORAL at 08:24

## 2017-01-01 RX ADMIN — SODIUM CHLORIDE 75 ML/HR: 900 INJECTION, SOLUTION INTRAVENOUS at 12:32

## 2017-01-01 RX ADMIN — INSULIN LISPRO 1 UNITS: 100 INJECTION, SOLUTION INTRAVENOUS; SUBCUTANEOUS at 07:44

## 2017-01-01 RX ADMIN — PHENOBARBITAL SODIUM 65 MG: 65 INJECTION INTRAMUSCULAR; INTRAVENOUS at 08:14

## 2017-01-01 RX ADMIN — FAMOTIDINE 20 MG: 20 TABLET ORAL at 08:13

## 2017-01-01 RX ADMIN — FUROSEMIDE 40 MG: 10 INJECTION, SOLUTION INTRAMUSCULAR; INTRAVENOUS at 17:48

## 2017-01-01 RX ADMIN — CARVEDILOL 6.25 MG: 6.25 TABLET, FILM COATED ORAL at 07:47

## 2017-01-01 RX ADMIN — HYDROCODONE BITARTRATE AND ACETAMINOPHEN 2.5 MG: 7.5; 325 TABLET ORAL at 08:01

## 2017-01-01 RX ADMIN — HEPARIN SODIUM 5000 UNITS: 5000 INJECTION, SOLUTION INTRAVENOUS; SUBCUTANEOUS at 22:16

## 2017-01-01 RX ADMIN — HYDROMORPHONE HYDROCHLORIDE 2 MG: 2 TABLET ORAL at 17:45

## 2017-01-01 RX ADMIN — HEPARIN SODIUM 5000 UNITS: 5000 INJECTION, SOLUTION INTRAVENOUS; SUBCUTANEOUS at 13:14

## 2017-01-01 RX ADMIN — HYDROMORPHONE HYDROCHLORIDE 0.5 MG: 1 INJECTION, SOLUTION INTRAMUSCULAR; INTRAVENOUS; SUBCUTANEOUS at 13:39

## 2017-01-01 RX ADMIN — PIPERACILLIN SODIUM,TAZOBACTAM SODIUM 4.5 G: 4; .5 INJECTION, POWDER, FOR SOLUTION INTRAVENOUS at 09:58

## 2017-01-01 RX ADMIN — LEVETIRACETAM 500 MG: 500 TABLET ORAL at 17:18

## 2017-01-01 RX ADMIN — GABAPENTIN 600 MG: 300 CAPSULE ORAL at 15:49

## 2017-01-01 RX ADMIN — SPIRONOLACTONE 25 MG: 25 TABLET, FILM COATED ORAL at 09:01

## 2017-01-01 RX ADMIN — Medication 2 MCG/MIN: at 14:25

## 2017-01-01 RX ADMIN — HYDROCODONE BITARTRATE AND ACETAMINOPHEN 1 TABLET: 5; 325 TABLET ORAL at 16:31

## 2017-01-01 RX ADMIN — LEVETIRACETAM 500 MG: 500 TABLET, FILM COATED ORAL at 17:51

## 2017-01-01 RX ADMIN — LACOSAMIDE 100 MG: 100 TABLET, FILM COATED ORAL at 09:37

## 2017-01-01 RX ADMIN — GABAPENTIN 600 MG: 300 CAPSULE ORAL at 08:08

## 2017-01-01 RX ADMIN — LEVETIRACETAM 250 MG: 250 TABLET, FILM COATED ORAL at 17:16

## 2017-01-01 RX ADMIN — Medication 10 ML: at 05:02

## 2017-01-01 RX ADMIN — STANDARDIZED SENNA CONCENTRATE AND DOCUSATE SODIUM 2 TABLET: 8.6; 5 TABLET, FILM COATED ORAL at 09:13

## 2017-01-01 RX ADMIN — CARVEDILOL 6.25 MG: 6.25 TABLET, FILM COATED ORAL at 17:09

## 2017-01-01 RX ADMIN — Medication 5 ML: at 09:06

## 2017-01-01 RX ADMIN — HYDROCODONE BITARTRATE AND ACETAMINOPHEN 1 TABLET: 5; 325 TABLET ORAL at 08:13

## 2017-01-01 RX ADMIN — LEVETIRACETAM 500 MG: 500 TABLET, FILM COATED ORAL at 09:37

## 2017-01-01 RX ADMIN — FLUCONAZOLE 200 MG: 100 TABLET ORAL at 09:13

## 2017-01-01 RX ADMIN — INSULIN LISPRO 2 UNITS: 100 INJECTION, SOLUTION INTRAVENOUS; SUBCUTANEOUS at 12:02

## 2017-01-01 RX ADMIN — GABAPENTIN 600 MG: 300 CAPSULE ORAL at 18:00

## 2017-01-01 RX ADMIN — GABAPENTIN 600 MG: 300 CAPSULE ORAL at 16:04

## 2017-01-01 RX ADMIN — HYDROMORPHONE HYDROCHLORIDE 0.5 MG: 1 INJECTION, SOLUTION INTRAMUSCULAR; INTRAVENOUS; SUBCUTANEOUS at 09:02

## 2017-01-01 RX ADMIN — LEVETIRACETAM 500 MG: 500 TABLET ORAL at 08:13

## 2017-01-01 RX ADMIN — LEVETIRACETAM 500 MG: 500 TABLET ORAL at 17:15

## 2017-01-01 RX ADMIN — HYDROCODONE BITARTRATE AND ACETAMINOPHEN 1 TABLET: 5; 325 TABLET ORAL at 18:17

## 2017-01-01 RX ADMIN — DEXTROSE 50 % IN WATER (D50W) INTRAVENOUS SYRINGE 25 G: at 22:39

## 2017-01-01 RX ADMIN — INSULIN LISPRO 2 UNITS: 100 INJECTION, SOLUTION INTRAVENOUS; SUBCUTANEOUS at 18:02

## 2017-01-01 RX ADMIN — GABAPENTIN 600 MG: 300 CAPSULE ORAL at 20:53

## 2017-01-01 RX ADMIN — LACOSAMIDE 100 MG: 100 TABLET, FILM COATED ORAL at 17:09

## 2017-01-01 RX ADMIN — DIPHENHYDRAMINE HYDROCHLORIDE 50 MG: 25 CAPSULE ORAL at 08:38

## 2017-01-01 RX ADMIN — SODIUM CHLORIDE, PRESERVATIVE FREE 3 ML: 5 INJECTION INTRAVENOUS at 16:29

## 2017-01-01 RX ADMIN — GABAPENTIN 600 MG: 300 CAPSULE ORAL at 05:01

## 2017-01-01 RX ADMIN — HYDROMORPHONE HYDROCHLORIDE 1 MG: 1 INJECTION, SOLUTION INTRAMUSCULAR; INTRAVENOUS; SUBCUTANEOUS at 06:08

## 2017-01-01 RX ADMIN — FOLIC ACID: 5 INJECTION, SOLUTION INTRAMUSCULAR; INTRAVENOUS; SUBCUTANEOUS at 11:00

## 2017-01-01 RX ADMIN — ASPIRIN 81 MG: 81 TABLET, COATED ORAL at 09:05

## 2017-01-01 RX ADMIN — FAMOTIDINE 20 MG: 10 INJECTION INTRAVENOUS at 12:01

## 2017-01-01 RX ADMIN — LEVETIRACETAM 250 MG: 250 TABLET, FILM COATED ORAL at 17:14

## 2017-01-01 RX ADMIN — Medication 3 ML: at 09:06

## 2017-01-01 RX ADMIN — PIPERACILLIN SODIUM,TAZOBACTAM SODIUM 4.5 G: 4; .5 INJECTION, POWDER, FOR SOLUTION INTRAVENOUS at 02:39

## 2017-01-01 RX ADMIN — SODIUM CHLORIDE 200 MG: 900 INJECTION, SOLUTION INTRAVENOUS at 16:52

## 2017-01-01 RX ADMIN — PIPERACILLIN SODIUM,TAZOBACTAM SODIUM 4.5 G: 4; .5 INJECTION, POWDER, FOR SOLUTION INTRAVENOUS at 18:13

## 2017-01-01 RX ADMIN — LISINOPRIL 2.5 MG: 5 TABLET ORAL at 12:25

## 2017-01-01 RX ADMIN — SENNOSIDES 8.6 MG: 8.6 TABLET, FILM COATED ORAL at 09:06

## 2017-01-01 RX ADMIN — LEVETIRACETAM 500 MG: 500 TABLET, FILM COATED ORAL at 08:01

## 2017-01-01 RX ADMIN — Medication 5 ML: at 23:01

## 2017-01-01 RX ADMIN — INSULIN LISPRO 10 UNITS: 100 INJECTION, SOLUTION INTRAVENOUS; SUBCUTANEOUS at 16:04

## 2017-01-01 RX ADMIN — SODIUM CHLORIDE 75 ML/HR: 900 INJECTION, SOLUTION INTRAVENOUS at 18:09

## 2017-01-01 RX ADMIN — ACETAMINOPHEN 650 MG: 325 TABLET ORAL at 23:00

## 2017-01-01 RX ADMIN — VANCOMYCIN HYDROCHLORIDE 1000 MG: 1 INJECTION, POWDER, LYOPHILIZED, FOR SOLUTION INTRAVENOUS at 13:19

## 2017-06-13 PROBLEM — R11.2 NAUSEA AND VOMITING: Status: ACTIVE | Noted: 2017-01-01

## 2017-06-13 PROBLEM — K60.4 PERIRECTAL FISTULA: Status: ACTIVE | Noted: 2017-01-01

## 2017-06-13 PROBLEM — F10.11 ALCOHOL ABUSE, IN REMISSION: Chronic | Status: ACTIVE | Noted: 2017-01-01

## 2017-06-13 PROBLEM — E87.6 HYPOKALEMIA: Status: ACTIVE | Noted: 2017-01-01

## 2017-06-13 NOTE — PROGRESS NOTES
TRANSFER - IN REPORT:    Verbal report received from LEIGHANN Joseph(name) on Margret Promise  being received from ED(unit) for routine progression of care      Report consisted of patients Situation, Background, Assessment and   Recommendations(SBAR). Information from the following report(s) Kardex, Intake/Output, MAR and Recent Results was reviewed with the receiving nurse. Opportunity for questions and clarification was provided. Assessment completed upon patients arrival to unit and care assumed.

## 2017-06-13 NOTE — ED TRIAGE NOTES
Pt states he is having rectal pain for the past two weeks. Pt's wife states there is yellow pus coming from the pt's bottom.

## 2017-06-13 NOTE — ED NOTES
Patient transferred to  [phone], receiving RN, Mariposa Elmore, given opportunity to review chart and call with questions.

## 2017-06-13 NOTE — H&P
Hospitalist Admission History and Physical       Chief Complaint   Patient presents with    Anal Pain       Subjective:   Patient is an acutely and chronically ill appearing, frail, malnourished 64 y.o.  male who presented to ER this pm with approximately 2 week history of painful perianal area. He is accompanied by his wife, who reports it suddenly burst and began draining foul smelling thick, yellow pus. He has never had this problem in the past. Wound culture done by me in ER. Patient dosing off and on throughout interview, adds minimal history, and is a very poor historian as is wife. She does report he had three episodes of diarrhea last week with jelly consistency, none this week. Also reports vomiting x 2 two days ago. Denies any melena, bright red bleeding, vomiting or hematochezia, although he did have a GI bleed with peptic ulcer greater than 20 years ago. Treated for UTi about three months ago, no symptoms at present. he formerly drank  \"alot\" of alcohol, quit 2 years ago, has not had any since. He was diagnosed with diabetes about 8 years ago, been on insulin for about the same amount of time, takes lantus bid. He also takes humalog on a sliding scale, but neither of them knows what the scale is. They seem to have a poor understanding of diabetes. Wife states she checks patient' s glucose bid, usually around 420. A1C in November last year was 11.0. He also complains of generalized weakness, and vague pain in the arms. He had bilateral AKAs about 6 years ago after multiple bouts of lower extremity diabetic ulcerations and gangrene bilaterally and is wheelchair bound. Denies shortness of breath, chest pain, any additional symptoms. Additional history as noted below. He also began having seizures about three years ago, last seizure about one year ago. Patient and wife both do not know what kind of seizures he actually has.       Past Medical History:   Diagnosis Date    Alcohol abuse, in remission 6/13/2017    Arthritis     CAD (coronary artery disease) 2008    MI 2006 with stent, stent 2008    Chronic hyponatremia 3/3/2014    Chronic pain     generalized    Dyslipidemia     GERD (gastroesophageal reflux disease)     Hypercholesterolemia     Hypertension     under control with med    Insulin dependent diabetes mellitus ([facility] type 2 since 2005    insulin reliant. bs: \"120'S\" BUT RUNS HIGH    Medical non-compliance 6/25/2012    MI (myocardial infarction) ([facility] [address]  2008    EF 55%    Other postprocedural status 1/2/2014 07/03/2013: S/P Right common femoral endarterectomy     PAD (peripheral artery disease) ([facility] 10/5/2009    10/24/12 Graft thrombectomy through leg incision of left iliac  to popliteal bypass and left popliteal to posterior tibial bypass -Dr. Primo Carlisle  10/15/09 Left external Iliac artery to above the knee popliteal artery bypass graft propaten PTFE, stenting L EIA- Dr. Primo Carlisle 10/5/09 Open amputation left 2nd, 3rd toes- Dr. Allyssa Holman 11/6/08 Left common femoral, superficial femoral and profunda femoris art    PUD (peptic ulcer disease)     PVD (peripheral vascular disease) ([facility]     Seizures ([facility]     x1 about 2010?     Sepsis ([facility] 2/26/2014    Thrombosis of Left External Iliac to above knee popliteal artery bypass graft 9/23/2010    Tobacco abuse 10/5/2009        Past Surgical History:   Procedure Laterality Date    AMPUTATION TOE,I-P JT  2008    3 toes on left foot    BYPASS GRAFT OTHR,ILIOFEMORAL  2009    left     CARDIAC SURG PROCEDURE UNLIST  2009    2-3 stents    HX ABOVE KNEE AMPUTATION Right 2014    HX AMPUTATION Left 11/14/2012    Left leg above the knee amputation     HX FEMORAL BYPASS  8/29/12    OCCLUDED GRAFT    HX THROMBECTOMY  7/02/12    Graft thrombectomy, left common iliac artery angioplasty and stent, left popliteal artery balloon angioplasty    HX THROMBECTOMY  10/22/2012    Graft thrombectomy through leg incision of left iliac to popliteal bypass and left popliteal to posterior tibial bypass    VASCULAR SURGERY PROCEDURE UNLIST  07/03/2013    R common femoral endarterectomy, re-do groin dissection    VASCULAR SURGERY PROCEDURE UNLIST  06/13/2011    R common femoral endarterectomy, left common and external iliac artery balloon angioplasty,  selective catheterization of left external iliac artery from right common femoral artery    VASCULAR SURGERY PROCEDURE UNLIST  06/25/2012    L Femoral-popliteal graft thrombectomy        Family History   Problem Relation Age of Onset    Stroke Mother     Hypertension Mother     Diabetes Other      PATIENT IS  TO CARLOS:  CELL [phone]     Social History   Substance Use Topics    Smoking status: Current Every Day Smoker     Packs/day: 0.25     Years: 46.00    Smokeless tobacco: Never Used    Alcohol use 2.0 oz/week     4 Standard drinks or equivalent per week        History   Drug Use    Yes    Special: Marijuana     Comment: no longer uses       Allergies   Allergen Reactions    Contrast Agent [Iodine] Hives     IVP DYE/Contrast, pt medicated with prednisone 50mg x 3 and still had hives, itching. DID WELL THE NEXT TIME WITHOUT ANY SYMPTOMS    Restoril [Temazepam] Hives       Prior to Admission medications    Medication Sig Start Date End Date Taking? Authorizing Provider   insulin glargine (LANTUS) 100 unit/mL injection 10 Units by SubCUTAneous route every morning. Yes Historical Provider   insulin glargine (LANTUS) 100 unit/mL injection 25 Units by SubCUTAneous route nightly. Yes Historical Provider   insulin lispro (HUMALOG) 100 unit/mL injection by SubCUTAneous route two (2) times a day. Indications: USES SLIDING SCALE BID, NOT SURE OF WHAT IT IS PER WIFE   Yes Historical Provider   lacosamide (VIMPAT) 100 mg tab tablet Take 100 mg by mouth two (2) times a day. Yes Historical Provider   gabapentin (NEURONTIN) 300 mg capsule Take 2 Caps by mouth three (3) times daily. 11/27/12  Yes ARNULFO Loza   levETIRAcetam (KEPPRA) 500 mg tablet Take 1 Tab by mouth two (2) times a day. 1/30/16   Nathalie Rascon MD   atorvastatin (LIPITOR) 80 mg tablet Take 80 mg by mouth daily. Mesfin Waddell MD   aspirin delayed-release 81 mg tablet Take 81 mg by mouth daily. Mesfin Waddell MD   lisinopril (PRINIVIL, ZESTRIL) 2.5 mg tablet Take 1 Tab by mouth daily. 1/18/12   Landy Pennington MD   ranitidine (ZANTAC) 150 mg tablet Take 150 mg by mouth two (2) times a day. Historical Provider     PHP:  NEW HORIZONS:  Jose Mireles MD  Vascular:  [facility]    Review of Systems  A comprehensive review of systems was negative except for that written in the HPI. Objective:     Visit Vitals    /75    Pulse 81    Temp 98.3 °F (36.8 °C)    Resp 16    Ht 5' 8\" (1.727 m)    Wt 56.7 kg (125 lb)    SpO2 97%    BMI 19.01 kg/m2      Data Review:   Recent Results (from the past 24 hour(s))   CBC WITH AUTOMATED DIFF    Collection Time: 06/13/17  2:10 PM   Result Value Ref Range    WBC 7.4 4.3 - 11.1 K/uL    RBC 4.02 (L) 4.23 - 5.67 M/uL    HGB 10.9 (L) 13.6 - 17.2 g/dL    HCT 32.1 (L) 41.1 - 50.3 %    MCV 79.9 79.6 - 97.8 FL    MCH 27.1 26.1 - 32.9 PG    MCHC 34.0 31.4 - 35.0 g/dL    RDW 18.5 (H) 11.9 - 14.6 %    PLATELET 046 431 - 334 K/uL    MPV 8.9 (L) 10.8 - 14.1 FL    DF AUTOMATED      NEUTROPHILS 43 43 - 78 %    LYMPHOCYTES 46 (H) 13 - 44 %    MONOCYTES 7 4.0 - 12.0 %    EOSINOPHILS 3 0.5 - 7.8 %    BASOPHILS 1 0.0 - 2.0 %    IMMATURE GRANULOCYTES 0.3 0.0 - 5.0 %    ABS. NEUTROPHILS 3.2 1.7 - 8.2 K/UL    ABS. LYMPHOCYTES 3.5 0.5 - 4.6 K/UL    ABS. MONOCYTES 0.5 0.1 - 1.3 K/UL    ABS. EOSINOPHILS 0.2 0.0 - 0.8 K/UL    ABS. BASOPHILS 0.0 0.0 - 0.2 K/UL    ABS. IMM.  GRANS. 0.0 0.0 - 0.5 K/UL   METABOLIC PANEL, COMPREHENSIVE    Collection Time: 06/13/17  2:10 PM   Result Value Ref Range    Sodium 136 136 - 145 mmol/L    Potassium 3.1 (L) 3.5 - 5.1 mmol/L    Chloride 95 (L) 98 - 107 mmol/L    CO2 34 (H) 21 - 32 mmol/L    Anion gap 7 7 - 16 mmol/L    Glucose 336 (H) 65 - 100 mg/dL    BUN 10 8 - 23 MG/DL    Creatinine 0.55 (L) 0.8 - 1.5 MG/DL    GFR est AA >60 >60 ml/min/1.73m2    GFR est non-AA >60 >60 ml/min/1.73m2    Calcium 8.6 8.3 - 10.4 MG/DL    Bilirubin, total 0.2 0.2 - 1.1 MG/DL    ALT (SGPT) 10 (L) 12 - 65 U/L    AST (SGOT) 9 (L) 15 - 37 U/L    Alk. phosphatase 117 50 - 136 U/L    Protein, total 8.3 (H) 6.3 - 8.2 g/dL    Albumin 2.1 (L) 3.2 - 4.6 g/dL    Globulin 6.2 (H) 2.3 - 3.5 g/dL    A-G Ratio 0.3 (L) 1.2 - 3.5     POC LACTIC ACID    Collection Time: 06/13/17  2:14 PM   Result Value Ref Range    Lactic Acid (POC) 0.8 0.5 - 1.9 mmol/L   CT PELVIS:  Normal-appearing urinary bladder. Relatively normal-appearing prostate and seminal vesicles on this noncontrast CT. There is mild phlegmonous appearing soft tissue density in the perirectal/perianal region posteriorly, this is suboptimally evaluated in the absence of rectal and IV contrast, a small underlying perirectal/perianal abscess cannot be entirely excluded here. Sigmoid diverticula. Normal-appearing partially visualized small bowel, colon and appendix. Severe calcific     atherosclerosis of the distal abdominal aorta with a vascular extent   extending from the left common iliac artery through the superficial femoral artery. No evidence of pelvic free fluid. No evidence of significant inguinal or pelvic sidewall lymphadenopathy. Visualized osseous structures unremarkable. IMPRESSION:  Mild soft tissue phlegmon in the posterior perirectal/perianal region, suboptimally evaluated here, however a small perirectal/perianal abscess cannot be excluded. Elective outpatient MRI of the pelvis with and without contrast perianal fistula protocol may be helpful in further evaluation. Other incidental findings as above.     PHYSICAL EXAM:  General appearance: Drowsy, acutely and chronically ill appearing. Lying face down in bed sleepy. Oriented.   Both patient and wife are extremely poor historians. Fragile. Perianal drainage extremely foul smelling and purulent appearing. Poor personal hygiene. Head: Normocephalic, without obvious abnormality, atraumatic  Eyes: conjunctivae/corneas clear. PERRL  Throat: Lips, mucosa, and tongue dry. Teeth missing. Neck: supple, symmetrical, trachea midline, no carotid bruit and no JVD  Lungs: clear to auscultation bilaterally, some expiratory wheezes, occasional cough. Heart: regular rate and rhythm, S1, S2 normal, no murmur, click, rub or gallop  Abdomen: soft, non-tender. Bowel sounds normal. No masses,  no organomegaly  Extremities:  Bilateral AKA with well healed stumps. Upper extremities normal, atraumatic, no cyanosis or edema  Skin: approx 2 cm skin opening just above anus with moderate amounts very foul smelling purulent, yellow drainage from same. Wound cultured, Qtip extends at least 2 cm into wound with ease. Skin color, texture, turgor otherwise dulled with chronic disease. rashes or lesions  Neurologic: Grossly normal, no unilateral deficit.     Assessment:   Perirectal fistula, draining    Severe PAD with S/P popliteal bypass  History of multiple amputations with bilateral AKAs  Insulin dependent diabetes, poor control  HTN  Alcohol abuse in remission  Allergy to intravenous contrast  Extensive CAD   Medical non-compliance   Dementia   Malnutrition   Alcohol abuse, in remission   Hypokalemia  Nausea and vomiting   Anemia   Hypokalemia    Plan:   Patient admitted to surgical bed for Dr Alicia Wing  Seen and examined by Dr Bazzi Market also  Most adm orders per Dr Dawna camacho reviewed and reconciled by me  Probiotics  Haldol prn  MRI abscess as per Radiologist recommendation  Use allergy protocol prior to MRI, first dose 2000 tonight making MRI at 0900 in am.   Continue clindamycin  Replace K+  Check mag, phos, A1C  Blood cultures pending  Wound culture pending  Urinalysis  Repeat labs in am   Consult general surgery for possible I+D

## 2017-06-13 NOTE — PROGRESS NOTES
Dr. Tianna Mehta recommended rectal contrast based on diagnosis, Dr. Faizan Medel states he does not want to give patient any contrast.

## 2017-06-13 NOTE — IP AVS SNAPSHOT
Current Discharge Medication List  
  
START taking these medications Dose & Instructions Dispensing Information Comments Morning Noon Evening Bedtime  
 cefpodoxime 200 mg tablet Commonly known as:  Clora Lesches Your last dose was: Your next dose is:    
   
   
 Dose:  200 mg Take 1 Tab by mouth two (2) times a day for 7 days. Quantity:  14 Tab Refills:  0  
     
   
   
   
  
 clindamycin 300 mg capsule Commonly known as:  CLEOCIN Your last dose was: Your next dose is:    
   
   
 Dose:  300 mg Take 1 Cap by mouth three (3) times daily for 7 days. Quantity:  21 Cap Refills:  0 HYDROcodone-acetaminophen 5-325 mg per tablet Commonly known as:  Harish Vincent Your last dose was: Your next dose is:    
   
   
 Dose:  1 Tab Take 1 Tab by mouth every four (4) hours as needed. Max Daily Amount: 6 Tabs. Quantity:  15 Tab Refills:  0  
     
   
   
   
  
 polyethylene glycol 17 gram packet Commonly known as:  Nathaly Villalobos Your last dose was: Your next dose is:    
   
   
 Dose:  17 g Take 1 Packet by mouth daily as needed. Quantity:  30 Each Refills:  0  
     
   
   
   
  
 senna-docusate 8.6-50 mg per tablet Commonly known as:  Watson Sicks Your last dose was: Your next dose is:    
   
   
 Dose:  2 Tab Take 2 Tabs by mouth daily. Quantity:  30 Tab Refills:  0 CONTINUE these medications which have CHANGED Dose & Instructions Dispensing Information Comments Morning Noon Evening Bedtime * insulin glargine 100 unit/mL injection Commonly known as:  LANTUS What changed:  how much to take Your last dose was: Your next dose is:    
   
   
 Dose:  20 Units 20 Units by SubCUTAneous route every morning. Quantity:  1 Vial  
Refills:  0  
     
   
   
   
  
 * insulin glargine 100 unit/mL injection Commonly known as:  LANTUS What changed:  how much to take Your last dose was: Your next dose is:    
   
   
 Dose:  35 Units 35 Units by SubCUTAneous route nightly. Quantity:  1 Vial  
Refills:  0  
     
   
   
   
  
 * Notice: This list has 2 medication(s) that are the same as other medications prescribed for you. Read the directions carefully, and ask your doctor or other care provider to review them with you. CONTINUE these medications which have NOT CHANGED Dose & Instructions Dispensing Information Comments Morning Noon Evening Bedtime  
 aspirin delayed-release 81 mg tablet Your last dose was: Your next dose is:    
   
   
 Dose:  81 mg Take 81 mg by mouth daily. Refills:  0  
     
   
   
   
  
 gabapentin 300 mg capsule Commonly known as:  NEURONTIN Your last dose was: Your next dose is:    
   
   
 Dose:  600 mg Take 2 Caps by mouth three (3) times daily. Quantity:  180 Cap Refills:  3  
     
   
   
   
  
 levETIRAcetam 500 mg tablet Commonly known as:  KEPPRA Your last dose was: Your next dose is:    
   
   
 Dose:  500 mg Take 1 Tab by mouth two (2) times a day. Quantity:  60 Tab Refills:  3 LIPITOR 80 mg tablet Generic drug:  atorvastatin Your last dose was: Your next dose is:    
   
   
 Dose:  80 mg Take 80 mg by mouth daily. Refills:  0  
     
   
   
   
  
 lisinopril 2.5 mg tablet Commonly known as:  Patsy Cozier Your last dose was: Your next dose is:    
   
   
 Dose:  2.5 mg Take 1 Tab by mouth daily. Quantity:  30 Tab Refills:  5  
     
   
   
   
  
 raNITIdine 150 mg tablet Commonly known as:  ZANTAC Your last dose was: Your next dose is:    
   
   
 Dose:  150 mg Take 150 mg by mouth two (2) times a day. Refills:  0 VIMPAT 100 mg Tab tablet Generic drug:  lacosamide Your last dose was: Your next dose is:    
   
   
 Dose:  100 mg Take 100 mg by mouth two (2) times a day. Refills:  0 STOP taking these medications HumaLOG 100 unit/mL injection Generic drug:  insulin lispro Where to Get Your Medications Information on where to get these meds will be given to you by the nurse or doctor. ! Ask your nurse or doctor about these medications  
  cefpodoxime 200 mg tablet  
 clindamycin 300 mg capsule HYDROcodone-acetaminophen 5-325 mg per tablet  
 insulin glargine 100 unit/mL injection  
 insulin glargine 100 unit/mL injection  
 polyethylene glycol 17 gram packet  
 senna-docusate 8.6-50 mg per tablet

## 2017-06-13 NOTE — IP AVS SNAPSHOT
Kimberlee Maldonado 
 
 
 [address] [facility] [address] [facility] 
[phone] Patient: Brayden Betancourt MRN: [id number] CXV:4/28/[birth year]055 You are allergic to the following Allergen Reactions Contrast Agent (Iodine) Hives IVP DYE/Contrast, pt medicated with prednisone 50mg x 3 and still had hives, itching. DID WELL THE NEXT TIME WITHOUT ANY SYMPTOMS Restoril (Temazepam) Hives Immunizations Administered for This Admission Name Date  
 TB Skin Test (PPD) Intradermal 6/13/2017 Recent Documentation Height Weight BMI Smoking Status 1.727 m 56.7 kg 19.01 kg/m2 Current Every Day Smoker Unresulted Labs Order Current Status LEVETIRACETAM (KEPPRA) In process Emergency Contacts Name Discharge Info Relation Home Work Mobile Art French  Child [2] [phone] Lupe Montez  Spouse [3] [phone] About your hospitalization You were admitted on:  June 13, 2017 You last received care in the:  [facility] 6 MED SURG You were discharged on:  June 16, 2017 Unit phone number:  [phone] Why you were hospitalized Your primary diagnosis was:  Perirectal Fistula Your diagnoses also included:  Dementia, Allergy To Intravenous Contrast, Dm (Diabetes Mellitus) Type Ii Uncontrolled, Periph Vascular Disorder (Hcc), Pad (Peripheral Artery Disease) ([facility]), Htn (Hypertension), Alcohol Abuse, In Remission, Tobacco Abuse, Medical Non-Compliance, Cad (Coronary Artery Disease), Status Post Above Knee Amputation (Hcc), Malnutrition (Hcc), Hypokalemia, Nausea And Vomiting Providers Seen During Your Hospitalizations Provider Role Specialty Primary office phone Ayden Lee MD Attending Provider Emergency Medicine [phone] Ana Montoya MD Attending Provider Internal Medicine [phone] Your Primary Care Physician (PCP) Primary Care Physician Office Phone Office [facility] [phone] Follow-up Information Follow up With Details Comments Contact Info Ulises Montilla ([facility])   [address] [facility] [id number] [phone] Jordan Luna MD Schedule an appointment as soon as possible for a visit in 1 week  [address] [place] [id number] 
[phone] Rylie Varma MD On 6/1/2018  [address] [facility] [address] 
[phone] Current Discharge Medication List  
  
START taking these medications Dose & Instructions Dispensing Information Comments Morning Noon Evening Bedtime  
 cefpodoxime 200 mg tablet Commonly known as:  Ammon Hines Your last dose was: Your next dose is:    
   
   
 Dose:  200 mg Take 1 Tab by mouth two (2) times a day for 7 days. Quantity:  14 Tab Refills:  0  
     
   
   
   
  
 clindamycin 300 mg capsule Commonly known as:  CLEOCIN Your last dose was: Your next dose is:    
   
   
 Dose:  300 mg Take 1 Cap by mouth three (3) times daily for 7 days. Quantity:  21 Cap Refills:  0 HYDROcodone-acetaminophen 5-325 mg per tablet Commonly known as:  Lynnda Levo Your last dose was: Your next dose is:    
   
   
 Dose:  1 Tab Take 1 Tab by mouth every four (4) hours as needed. Max Daily Amount: 6 Tabs. Quantity:  15 Tab Refills:  0  
     
   
   
   
  
 polyethylene glycol 17 gram packet Commonly known as:  Loli Marine Your last dose was: Your next dose is:    
   
   
 Dose:  17 g Take 1 Packet by mouth daily as needed. Quantity:  30 Each Refills:  0  
     
   
   
   
  
 senna-docusate 8.6-50 mg per tablet Commonly known as:  Tiffanie Williamsoner Your last dose was: Your next dose is:    
   
   
 Dose:  2 Tab Take 2 Tabs by mouth daily. Quantity:  30 Tab Refills:  0 CONTINUE these medications which have CHANGED Dose & Instructions Dispensing Information Comments Morning Noon Evening Bedtime * insulin glargine 100 unit/mL injection Commonly known as:  LANTUS What changed:  how much to take Your last dose was: Your next dose is:    
   
   
 Dose:  20 Units 20 Units by SubCUTAneous route every morning. Quantity:  1 Vial  
Refills:  0  
     
   
   
   
  
 * insulin glargine 100 unit/mL injection Commonly known as:  LANTUS What changed:  how much to take Your last dose was: Your next dose is:    
   
   
 Dose:  35 Units 35 Units by SubCUTAneous route nightly. Quantity:  1 Vial  
Refills:  0  
     
   
   
   
  
 * Notice: This list has 2 medication(s) that are the same as other medications prescribed for you. Read the directions carefully, and ask your doctor or other care provider to review them with you. CONTINUE these medications which have NOT CHANGED Dose & Instructions Dispensing Information Comments Morning Noon Evening Bedtime  
 aspirin delayed-release 81 mg tablet Your last dose was: Your next dose is:    
   
   
 Dose:  81 mg Take 81 mg by mouth daily. Refills:  0  
     
   
   
   
  
 gabapentin 300 mg capsule Commonly known as:  NEURONTIN Your last dose was: Your next dose is:    
   
   
 Dose:  600 mg Take 2 Caps by mouth three (3) times daily. Quantity:  180 Cap Refills:  3  
     
   
   
   
  
 levETIRAcetam 500 mg tablet Commonly known as:  KEPPRA Your last dose was: Your next dose is:    
   
   
 Dose:  500 mg Take 1 Tab by mouth two (2) times a day. Quantity:  60 Tab Refills:  3 LIPITOR 80 mg tablet Generic drug:  atorvastatin Your last dose was: Your next dose is:    
   
   
 Dose:  80 mg Take 80 mg by mouth daily. Refills:  0 lisinopril 2.5 mg tablet Commonly known as:  Salvadore Dine Your last dose was: Your next dose is:    
   
   
 Dose:  2.5 mg Take 1 Tab by mouth daily. Quantity:  30 Tab Refills:  5  
     
   
   
   
  
 raNITIdine 150 mg tablet Commonly known as:  ZANTAC Your last dose was: Your next dose is:    
   
   
 Dose:  150 mg Take 150 mg by mouth two (2) times a day. Refills:  0 VIMPAT 100 mg Tab tablet Generic drug:  lacosamide Your last dose was: Your next dose is:    
   
   
 Dose:  100 mg Take 100 mg by mouth two (2) times a day. Refills:  0 STOP taking these medications HumaLOG 100 unit/mL injection Generic drug:  insulin lispro Where to Get Your Medications Information on where to get these meds will be given to you by the nurse or doctor. ! Ask your nurse or doctor about these medications  
  cefpodoxime 200 mg tablet  
 clindamycin 300 mg capsule HYDROcodone-acetaminophen 5-325 mg per tablet  
 insulin glargine 100 unit/mL injection  
 insulin glargine 100 unit/mL injection  
 polyethylene glycol 17 gram packet  
 senna-docusate 8.6-50 mg per tablet Discharge Instructions None Discharge Orders None [facility] Announcement We are excited to announce that we are making your provider's discharge notes available to you in [employer]. You will see these notes when they are completed and signed by the physician that discharged you from your recent hospital stay. If you have any questions or concerns about any information you see in [employer], please call the Health Information Department where you were seen or reach out to your Primary Care Provider for more information about your plan of care. Introducing [facility] SERVICES!    
 [employer] introduces [employer] patient portal. Now you can access parts of your medical record, email your doctor's office, and request medication refills online. 1. In your internet browser, go to [email or web address] 2. Click on the First Time User? Click Here link in the Sign In box. You will see the New Member Sign Up page. 3. Enter your [employer] Access Code exactly as it appears below. You will not need to use this code after youve completed the sign-up process. If you do not sign up before the expiration date, you must request a new code. · [employer] Access Code: [email or web address] Expires: 9/12/2017  9:57 AM 
 
4. Enter the last four digits of your Social Security Number (xxxx) and Date of Birth (mm/dd/yyyy) as indicated and click Submit. You will be taken to the next sign-up page. 5. Create a [employer] ID. This will be your [employer] login ID and cannot be changed, so think of one that is secure and easy to remember. 6. Create a [employer] password. You can change your password at any time. 7. Enter your Password Reset Question and Answer. This can be used at a later time if you forget your password. 8. Enter your e-mail address. You will receive e-mail notification when new information is available in [address] 9. Click Sign Up. You can now view and download portions of your medical record. 10. Click the Download Summary menu link to download a portable copy of your medical information. If you have questions, please visit the Frequently Asked Questions section of the [employer] website. Remember, [employer] is NOT to be used for urgent needs. For medical emergencies, dial 911. Now available from your iPhone and Android! General Information Please provide this summary of care documentation to your next provider. Patient Signature:  ____________________________________________________________ Date:  ____________________________________________________________  
  
[facility]  Provider Signature: ____________________________________________________________ Date:  ____________________________________________________________

## 2017-06-14 NOTE — PROGRESS NOTES
Hospitalist Progress Note    2017  Admit Date: 2017  1:44 PM   NAME: Fran Handler   :  1955   MRN:  [id number]   Attending: Lizandro Connelly MD  PCP:  Ernst Mazariegos MD    SUBJECTIVE:     Pt is a 63 yo male admitted  with pain to perianal area x 1-2 weeks with new finding of yellow thick pus. CT pelvis reveals perianal abscess and question of fistula. UA c/w UTI. No leukocytosis, no fever. He was started on Clindamycin. This morning pt is sitting in the chair, states some perirectal pain but feeling okay. He denies urinary symptoms or frequent UTIs. Although his wife reported that he was treated for UTI about 3 months ago. MRI pending. Review of Systems negative with exception of pertinent positives noted above  PHYSICAL EXAM     Visit Vitals    /78 (BP 1 Location: Right arm, BP Patient Position: At rest)    Pulse 87    Temp 98.1 °F (36.7 °C)    Resp 18    Ht 5' 8\" (1.727 m)    Wt 56.7 kg (125 lb)    SpO2 98%    BMI 19.01 kg/m2      Temp (24hrs), Av.1 °F (36.7 °C), Min:97.6 °F (36.4 °C), Max:98.4 °F (36.9 °C)    Oxygen Therapy  O2 Sat (%): 98 % (17 0723)  Pulse via Oximetry: 94 beats per minute (17 0435)  O2 Device: Room air (17 0723)    Intake/Output Summary (Last 24 hours) at 17 1026  Last data filed at 17 0725   Gross per 24 hour   Intake                0 ml   Output             1100 ml   Net            -1100 ml      General: No acute distress    Lungs:  CTA Bilaterally.    Heart:  Regular rate and rhythm,  No murmur, rub, or gallop  Abdomen: Soft, Non distended, Non tender, Positive bowel sounds  Extremities: No cyanosis, clubbing or edema, bilat AKA  Neurologic:  No focal deficits    ASSESSMENT      Active Hospital Problems    Diagnosis Date Noted    Perirectal fistula 2017    Alcohol abuse, in remission 2017    Hypokalemia 2017    Nausea and vomiting 2017    Dementia 2014    Malnutrition ([facility] 12/30/2014    Status post above knee amputation ([facility] 11/21/2012     BILATERAL        DM (diabetes mellitus) type II uncontrolled, periph vascular disorder ([facility] 11/14/2012    Allergy to intravenous contrast 06/25/2012    Medical non-compliance 06/25/2012    CAD (coronary artery disease) 06/25/2012    PAD (peripheral artery disease) ([facility] 10/05/2009     10/24/12 Graft thrombectomy through leg incision of left iliac   to popliteal bypass and left popliteal to posterior tibial bypass -Dr. Vale Lujan    10/15/09 Left external Iliac artery to above the knee popliteal artery bypass graft propaten PTFE, stenting L EIA- Dr. Vale Lujan  10/5/09 Open amputation left 2nd, 3rd toes- Dr. Madi Hdez  11/6/08 Left common femoral, superficial femoral and profunda femoris artery endarterectomy with saphenous vein patch angioplasty- Dr. Vale Lujan  10/30/08 Left gr toe amputation- Dr. Adam Rogers HTN (hypertension) 10/05/2009    Tobacco abuse 10/05/2009     QUIT        A/P:    Rectal abscess with concern for fistula- MRI and general surgery consult pending. Cont Clinda D 2    UTI- Follow culture. Start Rocephin. DM 2- Uncontrolled. Cont home regimen Lantus 10 units am, Lantus 25 units pm.  Humalog SSI with meals. Seizure disorder- Cont home regimen Pari Mckeon DC planning- Consult SW to assist. Place ppd in case STR indicated.      DVT Prophylaxis: Heparin    Signed By: Michelle Aparicio NP     June 14, 2017

## 2017-06-14 NOTE — WOUND CARE
Patient seen for perirectal abscess. Noted it is at the anal sphincter. Patient states he has BM every 2-3 days. Has not had one for 2 days per patient. Wound base clean and pink during this assessment. Packed loosely with Aquacell Ag strip and ABD to secure. Using urinal but noted some on brief, changed brief and cleaned up during dressing change. Wound 0.8x0. 8x3.0cm. Discussed wound healing and goals. He is in agreement. No family at bedside. Answered all questions. Will continue to follow. Discussed with Arthur Ureña Np prior to this evaluation.

## 2017-06-14 NOTE — CONSULTS
[facility]  H&P/Consult Note    Penelope Severance  MRN: [id number]  :1955  Age:61 y.o.    HPI: Penelope Severance is a 64 y.o.  male who we are asked to evaluate for a draining jane-rectal abscess. He is a poor historian and reports that he is not sure how long abscess has been present. States that his wife noted the purulent drainage. He does report noting increased jane-rectal pain over the past week or so. He presented to the ED yesterday and was admitted by the Hospitalist. He has an extensive PMHx and reports that his BS have been running around 400 recently. A1C today 11.7. UA (+). He is a bilat AKA and is WC bound. He denies fevers, chills, sweats, CP, SOB, N/V/D/C or dysuria. General Surgery was consulted to evaluate for possible I&D. WBCs 7.4, LA 0.8. CT pelvis without contrast -- 17  History: pain, RECTAL PAIN and swelling, 61 years Male RECTAL PAIN and swelling  / NO ORAL OR IV CONTRAST PER DR. Sharri Umaña. History of severe IV dye allergy. Comparison: CT abdomen 2013  TECHNIQUE: Oral contrast was not administered. 125 cc of nonionic intravenous  contrast was injected, and axial helical CT images were obtained from the level  of the iliac crests through the pelvis. Coronal reformatted images were obtained  at the scanner console and made available for review. Radiation dose reduction  techniques were used for this study: Our CT scanners use one or all of the  following: Automated exposure control, adjustment of the mA and/or kVp according  to patient's size, iterative reconstruction. FINDINGS:  PELVIS:  Normal-appearing urinary bladder. Relatively normal-appearing prostate and  seminal vesicles on this noncontrast CT.  There is mild phlegmonous appearing  soft tissue density in the perirectal/perianal region posteriorly, this is  suboptimally evaluated in the absence of rectal and IV contrast, a small  underlying perirectal/perianal abscess cannot be entirely excluded here. Sigmoid diverticula. Normal-appearing partially visualized small bowel, colon  and appendix. Severe calcific atherosclerosis of the distal abdominal aorta  with a vascular extent extending from the left common iliac artery through the  superficial femoral artery. No evidence of pelvic free fluid. No evidence of  significant inguinal or pelvic sidewall lymphadenopathy. Visualized osseous  structures unremarkable. IMPRESSION:   1. Mild soft tissue phlegmon in the posterior perirectal/perianal region,  suboptimally evaluated here, however a small perirectal/perianal abscess cannot  be excluded. Elective outpatient MRI of the pelvis with and without contrast  perianal fistula protocol may be helpful in further evaluation. 2. Other incidental findings as above. Past Medical History:   Diagnosis Date    Alcohol abuse, in remission 6/13/2017    Arthritis     CAD (coronary artery disease) 2008    MI 2006 with stent, stent 2008    Chronic hyponatremia 3/3/2014    Chronic pain     generalized    Dyslipidemia     GERD (gastroesophageal reflux disease)     Hypercholesterolemia     Hypertension     under control with med    Insulin dependent diabetes mellitus ([address] type 2 since 2005    insulin reliant.  bs: \"120'S\" BUT RUNS HIGH    Medical non-compliance 6/25/2012    MI (myocardial infarction) ([address]  2008    EF 55%    Other postprocedural status 1/2/2014 07/03/2013: S/P Right common femoral endarterectomy     PAD (peripheral artery disease) ([address] 10/5/2009    10/24/12 Graft thrombectomy through leg incision of left iliac  to popliteal bypass and left popliteal to posterior tibial bypass -Dr. Aleena Lindquist  10/15/09 Left external Iliac artery to above the knee popliteal artery bypass graft propaten PTFE, stenting L EIA- Dr. Aleena Lindquist 10/5/09 Open amputation left 2nd, 3rd toes- Dr. Christal Libman 11/6/08 Left common femoral, superficial femoral and profunda femoris art    PUD (peptic ulcer disease)     PVD [facility]ipheral [facility]) [facility]     Seizures ([facility]     x1 about 2010?     Sepsis ([facility] 2/26/2014    Thrombosis of Left External Iliac to above knee popliteal artery bypass graft 9/23/2010    Tobacco abuse 10/5/2009     Past Surgical History:   Procedure Laterality Date    AMPUTATION TOE,I-P JT  2008    3 toes on left foot    BYPASS GRAFT OTHR,ILIOFEMORAL  2009    left     CARDIAC SURG PROCEDURE UNLIST  2009    2-3 stents    HX ABOVE KNEE AMPUTATION Right 2014    HX AMPUTATION Left 11/14/2012    Left leg above the knee amputation     HX FEMORAL BYPASS  8/29/12    OCCLUDED GRAFT    HX THROMBECTOMY  7/02/12    Graft thrombectomy, left common iliac artery angioplasty and stent, left popliteal artery balloon angioplasty    HX THROMBECTOMY  10/22/2012    Graft thrombectomy through leg incision of left iliac to popliteal bypass and left popliteal to posterior tibial bypass    VASCULAR SURGERY PROCEDURE UNLIST  07/03/2013    R common femoral endarterectomy, re-do groin dissection    VASCULAR SURGERY PROCEDURE UNLIST  06/13/2011    R common femoral endarterectomy, left common and external iliac artery balloon angioplasty,  selective catheterization of left external iliac artery from right common femoral artery    VASCULAR SURGERY PROCEDURE UNLIST  06/25/2012    L Femoral-popliteal graft thrombectomy     Current Facility-Administered Medications   Medication Dose Route Frequency    haloperidol lactate (HALDOL) injection 4 mg  4 mg IntraVENous Q6H PRN    hydrALAZINE (APRESOLINE) 20 mg/mL injection 20 mg  20 mg IntraVENous Q6H PRN    HYDROcodone-acetaminophen (NORCO)  mg tablet 1 Tab  1 Tab Oral Q4H PRN    morphine injection 2 mg  2 mg IntraVENous Q4H PRN    morphine injection 4 mg  4 mg IntraVENous Q4H PRN    polyethylene glycol (MIRALAX) packet 17 g  17 g Oral DAILY PRN    promethazine (PHENERGAN) with saline injection 25 mg  25 mg IntraVENous Q6H PRN    sodium chloride (NS) flush 5-10 mL  5-10 mL IntraVENous Q8H    sodium chloride (NS) flush 5-10 mL  5-10 mL IntraVENous PRN    acetaminophen (TYLENOL) tablet 650 mg  650 mg Oral Q4H PRN    HYDROcodone-acetaminophen (NORCO) 5-325 mg per tablet 1 Tab  1 Tab Oral Q4H PRN    naloxone (NARCAN) injection 0.4 mg  0.4 mg IntraVENous PRN    diphenhydrAMINE (BENADRYL) injection 12.5 mg  12.5 mg IntraVENous Q4H PRN    ondansetron (ZOFRAN) injection 4 mg  4 mg IntraVENous Q4H PRN    senna-docusate (PERICOLACE) 8.6-50 mg per tablet 2 Tab  2 Tab Oral DAILY PRN    zolpidem (AMBIEN) tablet 5 mg  5 mg Oral QHS PRN    insulin regular (NOVOLIN R, HUMULIN R) injection   SubCUTAneous AC&HS    dextrose 40% (GLUTOSE) oral gel 1 Tube  15 g Oral PRN    glucagon (GLUCAGEN) injection 1 mg  1 mg IntraMUSCular PRN    dextrose (D50W) injection syrg 12.5-25 g  25-50 mL IntraVENous PRN    clindamycin phosphate (CLEOCIN) 600 mg in 0.9% sodium chloride (MBP/ADV) 100 mL ADV  600 mg IntraVENous Q8H    Lactobacillus Acidoph & Bulgar (FLORANEX) tablet 2 Tab  2 Tab Oral BID    heparin (porcine) injection 5,000 Units  5,000 Units SubCUTAneous Q8H    predniSONE (DELTASONE) tablet 50 mg  50 mg Oral ONCE     Contrast agent [iodine] and Restoril [temazepam]  Social History     Social History    Marital status: LEGALLY      Spouse name: N/A    Number of children: N/A    Years of education: N/A     Social History Main Topics    Smoking status: Current Every Day Smoker     Packs/day: 0.25     Years: 46.00    Smokeless tobacco: Never Used    Alcohol use 2.0 oz/week     4 Standard drinks or equivalent per week    Drug use: Yes     Special: Marijuana      Comment: no longer uses    Sexual activity: Not Asked     Other Topics Concern    None     Social History Narrative     History   Smoking Status    Current Every Day Smoker    Packs/day: 0.25    Years: 46.00   Smokeless Tobacco    Never Used     Family History   Problem Relation Age of Onset    Stroke Mother     Hypertension Mother     Diabetes Other      ROS: The patient has no difficulty with chest pain or shortness of breath. No fever or chills. Comprehensive review of systems was otherwise unremarkable except as noted above. Physical Exam:   Visit Vitals    /78 (BP 1 Location: Right arm, BP Patient Position: At rest)    Pulse 87    Temp 98.1 °F (36.7 °C)    Resp 18    Ht 5' 8\" (1.727 m)    Wt 56.7 kg (125 lb)    SpO2 98%    BMI 19.01 kg/m2     Constitutional: Alert, oriented, cooperative patient in no acute distress; appears stated age    Eyes: Sclera are clear. EOMs intact  ENMT: No external lesions, gross hearing normal; no obvious neck masses, no ear or lip lesions, nares normal  CV: RRR, S1S2. Normal perfusion, no peripheral edema  Resp: No JVD. Breathing is non-labored; no audible wheezing. BBS clear, on RA  GI: Flat, soft and non-distended, non-tender  Musculoskeletal: Prior bilat AKAs, otherwise normal function. No embolic signs or cyanosis.    Neuro: Alert, oriented; moves all 4; no focal deficits  Psychiatric: Normal affect and mood, no memory impairment  Skin: Open, drainage jane-rectal wound, no surrounding erythema, mildly ttp    Recent vitals (if inpt):  Patient Vitals for the past 24 hrs:   BP Temp Pulse Resp SpO2 Height Weight   06/14/17 0723 153/78 98.1 °F (36.7 °C) 87 18 98 % - -   06/14/17 0435 131/79 98.4 °F (36.9 °C) 86 18 94 % - -   06/14/17 0000 (!) 173/93 98.3 °F (36.8 °C) 90 18 97 % - -   06/13/17 1941 116/72 97.6 °F (36.4 °C) 84 18 98 % - -   06/13/17 1818 146/82 97.7 °F (36.5 °C) 84 16 96 % - -   06/13/17 1720 147/75 98.3 °F (36.8 °C) 81 16 97 % - -   06/13/17 1700 167/87 - 82 - 96 % - -   06/13/17 1647 - - - - 95 % - -   06/13/17 1646 (!) 193/93 - - - - - -   06/13/17 1430 - - - - 98 % - -   06/13/17 1224 118/70 98.3 °F (36.8 °C) 90 18 99 % 5' 8\" (1.727 m) 56.7 kg (125 lb)       Labs:  Recent Labs      06/14/17   0615  06/13/17   1410   WBC  6.5  7.4   HGB 11.6*  10.9*   PLT  402  388   NA  131*  136   K  5.0  3.1*   CL  94*  95*   CO2  30  34*   BUN  17  10   CREA  0.66*  0.55*   GLU  336*  336*   TBILI   --   0.2   SGOT   --   9*   ALT   --   10*   AP   --   117       Lab Results   Component Value Date/Time    WBC 6.5 06/14/2017 06:15 AM    HGB 11.6 06/14/2017 06:15 AM    PLATELET 950 16/13/9661 06:15 AM    Sodium 131 06/14/2017 06:15 AM    Potassium 5.0 06/14/2017 06:15 AM    Chloride 94 06/14/2017 06:15 AM    CO2 30 06/14/2017 06:15 AM    BUN 17 06/14/2017 06:15 AM    Creatinine 0.66 06/14/2017 06:15 AM    Glucose 336 06/14/2017 06:15 AM    Glucose 494 06/10/2011 07:25 AM    INR 1.0 09/05/2012 01:20 AM    aPTT >100.0 10/24/2012 12:30 AM    Bilirubin, total 0.2 06/13/2017 02:10 PM    AST (SGOT) 9 06/13/2017 02:10 PM    ALT (SGPT) 10 06/13/2017 02:10 PM    Alk.  phosphatase 117 06/13/2017 02:10 PM    Lipase 80 04/18/2014 03:20 AM    Lactic acid 1.4 12/27/2014 11:45 AM    Ammonia 37 12/28/2014 04:23 PM    Troponin-I <0.05 01/16/2012 12:01 AM    Troponin-I, Qt. <0.02 01/30/2016 11:26 AM       Admission date (for inpatients): 6/13/2017   * No surgery found *  * No surgery found *    ASSESSMENT/PLAN:  Problem List  Date Reviewed: 6/13/2017          Codes Class Noted    * (Principal)Perirectal fistula ICD-10-CM: K60.4  ICD-9-CM: 565.1  6/13/2017        Alcohol abuse, in remission (Chronic) ICD-10-CM: F10.10  ICD-9-CM: 305.03  6/13/2017        Hypokalemia ICD-10-CM: E87.6  ICD-9-CM: 276.8  6/13/2017        Nausea and vomiting ICD-10-CM: R11.2  ICD-9-CM: 787.01  6/13/2017        UTI (urinary tract infection) ICD-10-CM: N39.0  ICD-9-CM: 599.0  12/30/2014        Acute encephalopathy ICD-10-CM: G93.40  ICD-9-CM: 348.30  12/30/2014        Dementia (Chronic) ICD-10-CM: F03.90  ICD-9-CM: 294.20  12/30/2014        Malnutrition ([facility] (Chronic) ICD-10-CM: E46  ICD-9-CM: 263.9  12/30/2014        Underweight ICD-10-CM: R63.6  ICD-9-CM: 783.22  12/30/2014        Acute renal failure [facility]) ICD-10-CM: N17.9  ICD-9-CM: 584.9  12/5/2014        Rectal bleed ICD-10-CM: K62.5  ICD-9-CM: 569.3  12/5/2014    Overview Signed 12/5/2014  3:54 PM by Lorenza Mondragon     Takes ASA, BRBPR per ER. Stable HGB but blood pressure LOW             Hypotension ICD-10-CM: I95.9  ICD-9-CM: 458.9  12/5/2014        S/P AKA (above knee amputation) bilateral ([facility] ICD-10-CM: A14.890, Z30.756  ICD-9-CM: V49.76  12/5/2014    Overview Signed 12/5/2014  3:54 PM by Lorenza Mondragon     Due to PVD, diabetes etc             Dehydration ICD-10-CM: E86.0  ICD-9-CM: 276.51  12/5/2014        Diarrhea ICD-10-CM: R19.7  ICD-9-CM: 787.91  12/5/2014    Overview Addendum 12/5/2014  3:59 PM by Lorenza Mondragon     With elevated WBC. No recent abx however  And most recent admission to hospital was in Spring             Leukocytosis ICD-10-CM: D72.829  ICD-9-CM: 288.60  12/5/2014    Overview Signed 12/5/2014  4:01 PM by Lorenza Mondragon     No obvious source of              Hyponatremia (Chronic) ICD-10-CM: E87.1  ICD-9-CM: 276.1  3/4/2014    Overview Signed 3/4/2014  4:01 PM by Meir Rojas NP     Chronic, asymptomatic             Chronic hyponatremia (Chronic) ICD-10-CM: E87.1  ICD-9-CM: 276.1  3/3/2014        Sepsis ([facility] (Chronic) ICD-10-CM: A41.9  ICD-9-CM: 038.9, 995.91  2/26/2014        Other postprocedural status ICD-10-CM: Z98.890  ICD-9-CM: V45.89  1/2/2014    Overview Addendum 1/2/2014  8:54 AM by Deja Cortez     07/03/2013: S/P Right common femoral endarterectomy, redo.              Status post above knee amputation ([facility] (Chronic) ICD-10-CM: Z89.619  ICD-9-CM: V49.76  11/21/2012    Overview Signed 6/13/2017  5:29 PM by Megan Wong NP     BILATERAL               Dyslipidemia ICD-10-CM: S50.3  ICD-9-CM: 272.4  11/15/2012        Carotid artery disease ([facility] (Chronic) ICD-10-CM: I77.9  ICD-9-CM: 447.9  11/15/2012        Gangrene from atherosclerosis, extremities ([facility] ICD-10-CM: J74.416  ICD-9-CM: 440.24  11/14/2012    Overview Addendum 2/26/2014  1:13 PM by Cristina Suárez, MARY     2/26/14 (Dr. Madison Gaines) [address] KNEE(AKA)/ RIGHT LEG   11/14/12 (Dr. Madison Gaines) Left leg above the knee amputation. DM (diabetes mellitus) type II uncontrolled, periph vascular disorder (HCC) (Chronic) ICD-10-CM: E11.51, E11.65  ICD-9-CM: 250.72, 443.81  11/14/2012        Postoperative anemia due to acute blood loss ICD-10-CM: D62  ICD-9-CM: 285.1  10/26/2012        Ischemic leg ICD-10-CM: I99.8  ICD-9-CM: 459.9  10/22/2012        Allergy to intravenous contrast (Chronic) ICD-10-CM: Z91.041  ICD-9-CM: V15.08  6/25/2012        CAD (coronary artery disease) (Chronic) ICD-10-CM: I25.10  ICD-9-CM: 414.00  6/25/2012        Medical non-compliance (Chronic) ICD-10-CM: Z91.19  ICD-9-CM: V15.81  6/25/2012        ETOH abuse (Chronic) ICD-10-CM: F10.10  ICD-9-CM: 305.00  6/10/2011    Overview Signed 6/10/2011  6:38 PM by ARNULFO Santana     12 pk of beer/ day for \"years\" until a month and have ago             Thrombosis of Left External Iliac to above knee popliteal artery bypass graft ICD-10-CM: I82.90  ICD-9-CM: 453.9  9/23/2010    Overview Addendum 10/25/2012 11:33 AM by Cristina Suárez, MARY     10/24/12 (Dr. Madison Gaines) Graft thrombectomy through leg incision of left iliac   to popliteal bypass and left popliteal to posterior tibial bypass.       7/2/12 (Dr. Madison Gaines) FEMORAL-POPLITEAL BYPASS - LEFT FEM-POP GRAFT THROMBECTOMY , LEFT ILIAC ARTERY STENT   ; ARTERIOGRAM; BALLOON ANGIOPLASTY - LEFT POPLITEAL ARTERY               Atherosclerosis of native arteries of the extremities with rest pain ICD-10-CM: I70.229  ICD-9-CM: 440.22  9/23/2010        PAD (peripheral artery disease) (HCC) (Chronic) ICD-10-CM: I73.9  ICD-9-CM: 443.9  10/5/2009    Overview Addendum 10/26/2012 10:21 AM by Cristina Suárez NP     10/24/12 Graft thrombectomy through leg incision of left iliac   to popliteal bypass and left popliteal to posterior tibial bypass - Ignacio Purchase    10/15/09 Left external Iliac artery to above the knee popliteal artery bypass graft propaten PTFE, stenting L EIA- Dr. Ignacio Carter  10/5/09 Open amputation left 2nd, 3rd toes- Dr. Shahbaz Delgado  11/6/08 Left common femoral, superficial femoral and profunda femoris artery endarterectomy with saphenous vein patch angioplasty- Dr. Ignacio Carter  10/30/08 Left gr toe amputation- Dr. Ignacio Carter             HTN (hypertension) (Chronic) ICD-10-CM: I10  ICD-9-CM: 401.9  10/5/2009        Tobacco abuse (Chronic) ICD-10-CM: Z72.0  ICD-9-CM: 305.1  10/5/2009    Overview Signed 6/13/2017  5:28 PM by Chan Regalado NP     QUIT              GERD (gastroesophageal reflux disease) (Chronic) ICD-10-CM: K21.9  ICD-9-CM: 530.81  10/5/2009            Principal Problem:    Perirectal fistula (6/13/2017)    Active Problems:    PAD (peripheral artery disease) ([facility] (10/5/2009)      Overview: 10/24/12 Graft thrombectomy through leg incision of left iliac       to popliteal bypass and left popliteal to posterior tibial bypass -Dr. Ignacio Carter            10/15/09 Left external Iliac artery to above the knee popliteal artery       bypass graft propaten PTFE, stenting L EIA- Dr. Ignacio Carter      10/5/09 Open amputation left 2nd, 3rd toes- Dr. Shahbaz Delgado      11/6/08 Left common femoral, superficial femoral and profunda femoris       artery endarterectomy with saphenous vein patch angioplasty- Dr. Ignacio Carter      10/30/08 Left gr toe amputation- Dr. Ignacio Carter      HTN (hypertension) (10/5/2009)      Tobacco abuse (10/5/2009)      Overview: QUIT       Allergy to intravenous contrast (6/25/2012)      CAD (coronary artery disease) (6/25/2012)      Medical non-compliance (6/25/2012)      DM (diabetes mellitus) type II uncontrolled, periph vascular disorder ([facility] (11/14/2012)      Status post above knee amputation ([facility] (11/21/2012)      Overview: BILATERAL            Dementia (12/30/2014)      Malnutrition ([facility] (12/30/2014)      Alcohol abuse, in remission (6/13/2017)      Hypokalemia (6/13/2017)      Nausea and vomiting (6/13/2017)       PLAN:    Continue management -- per Hospitalist    Discussed with Pt that he may need surgical intervention for drainage in order to heal this problem. Asked him to not eat or drink anything as Dr. Bharati Rodriguez would be able to take to surgery later today. Dr. Bharati Rodriguez planning to be DT to evaluate Pt in next couple hours. He states that he is hungry and that he intended to eat his entire breakfast. Explained that if he chooses to eat -- potential surgical intervention would be delayed. He states \"thats fine, I'm eating. \"      ADDENDUM:    Evaluated with Dr. Bharati Rodriguez -- no surgical intervention required as wound is currently draining.    Will have Wound RN eval for local wound care  Needs good BS management -- defer to Hospitalist  Sitz baths BID    Will sign-off      Signed:  NATY RodriguezC

## 2017-06-14 NOTE — PROGRESS NOTES
Problem: Self Care Deficits Care Plan (Adult)  Goal: *Acute Goals and Plan of Care (Insert Text)  No goals set as pt is functioning near baseline with ADLs. Comments:       OCCUPATIONAL THERAPY: Initial Assessment, Discharge and AM 6/14/2017  INPATIENT: Hospital Day: 2  [place] / Product Type: Medicare /      NAME/AGE/GENDER: Perrin Gaucher is a 64 y.o. male   PRIMARY DIAGNOSIS:  Perirectal fistula Perirectal fistula Perirectal fistula        ICD-10: Treatment Diagnosis:        · Generalized Muscle Weakness (M62.81)   Precautions/Allergies:         Contrast agent [iodine] and Restoril [temazepam]       ASSESSMENT:      Mr. Chava Graham presents to hospital for above. Pt is ad venus in room upon arrival of OT. He completed w/c to reclining chair transfer with no physical assistance. Pt reports that he is typically independent/mod I at home with ADLs/transfers, but that his wife is there to assist with supervision/set up/SBA as needed. Per BUE screen, pt is [facility] with strength and AROM. Skilled OT services are not indicated at this time as pt appears to be functioning near baseline. He was left with resistive sponges and theraband to complete HEP program at his leisure. Will d/c pt from OT log. This section established at most recent assessment   PROBLEM LIST (Impairments causing functional limitations):  1. N/A    INTERVENTIONS PLANNED: (Benefits and precautions of occupational therapy have been discussed with the patient.)  1. N/A      TREATMENT PLAN: Frequency/Duration: N/A  Rehabilitation Potential For Stated Goals: N/A      RECOMMENDED REHABILITATION/EQUIPMENT: (at time of discharge pending progress): None. OCCUPATIONAL PROFILE AND HISTORY:   History of Present Injury/Illness (Reason for Referral):  See H&P  Past Medical History/Comorbidities:   Mr. Chava Graham  has a past medical history of Alcohol abuse, in remission (6/13/2017);  Arthritis; CAD (coronary artery disease) (2008); Chronic hyponatremia (3/3/2014); Chronic pain; Dyslipidemia; GERD (gastroesophageal reflux disease); Hypercholesterolemia; Hypertension; Insulin dependent diabetes mellitus ([facility] (type 2 since 2005); Medical non-compliance (6/25/2012); MI (myocardial infarction) ([facility] ( 2008); Other postprocedural status (1/2/2014); PAD (peripheral artery disease) ([facility] (10/5/2009); PUD (peptic ulcer disease); PVD (peripheral vascular disease) ([facility] Seizures ([facility] Sepsis ([facility] (2/26/2014); 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 Mr. Gris Cruz  has a past surgical history that includes amputation toe,i-p jt (2008); cardiac surg procedure unlist (2009); thrombectomy (7/02/12); femoral bypass (8/29/12); amputation (Left, 11/14/2012); thrombectomy (10/22/2012); bypass graft othr,iliofemoral (2009); vascular surgery procedure unlist (07/03/2013); vascular surgery procedure unlist (06/13/2011); vascular surgery procedure unlist (06/25/2012); and above knee amputation (Right, 2014). Social History/Living Environment:   Home Environment: Private residence  One/Two Story Residence: One story  Support Systems: Spouse/Significant Other/Partner  Patient Expects to be Discharged to[de-identified] Private residence  Current DME Used/Available at Home: Wheelchair, Commode, bedside  Prior Level of Function/Work/Activity:  Mod I at baseline with ADLs. Reports his wife provides him with as needed assistance. Personal Factors:          Sex:  male        Age:  64 y.o.         Past/Current Experience:  B AKA   Number of Personal Factors/Comorbidities that affect the Plan of Care: Brief history (0):  LOW COMPLEXITY   ASSESSMENT OF OCCUPATIONAL PERFORMANCE[de-identified]   Activities of Daily Living:           Basic ADLs (From Assessment) Complex ADLs (From Assessment)   Basic ADL  Feeding: Independent  Oral Facial Hygiene/Grooming: Independent  Bathing: Modified independent  Upper Body Dressing: Modified independent  Lower Body Dressing: Modified independent  Toileting: Modified independent     Grooming/Bathing/Dressing Activities of Daily Living     Cognitive Retraining  Safety/Judgement: Awareness of environment                       Bed/Mat Mobility  Rolling: Independent  Supine to Sit: Independent  Sit to Supine: Independent  Bed to Chair: Modified independent  Scooting: Independent          Most Recent Physical Functioning:   Gross Assessment:  AROM: Within functional limits  Strength: Within functional limits               Posture:     Balance:  Sitting: Intact Bed Mobility:  Rolling: Independent  Supine to Sit: Independent  Sit to Supine: Independent  Scooting: Independent  Wheelchair Mobility:     Transfers:  Bed to Chair: Modified independent                    Patient Vitals for the past 6 hrs:       BP BP Patient Position SpO2 Pulse   17 0723 153/78 At rest 98 % 87        Mental Status  Neurologic State: Alert  Orientation Level: Oriented X4  Cognition: Appropriate decision making, Appropriate for age attention/concentration, Appropriate safety awareness  Perception: Appears intact  Perseveration: No perseveration noted  Safety/Judgement: Awareness of environment                               Physical Skills Involved:  1. N/A Cognitive Skills Affected (resulting in the inability to perform in a timely and safe manner):  1. N/A Psychosocial Skills Affected:  1. N/A   Number of elements that affect the Plan of Care: 1-3:  LOW COMPLEXITY   CLINICAL DECISION MAKIN [facility] [address] [id number] AM-PAC 6 Clicks   Basic Mobility Inpatient Short Form  How much help from another person does the patient currently need. .. Total A Lot A Little None   1. Putting on and taking off regular lower body clothing?   [ ] 1   [ ] 2   [ ] 3   [X] 4   2. Bathing (including washing, rinsing, drying)? [ ] 1   [ ] 2   [ ] 3   [X] 4   3.   Toileting, which includes using toilet, bedpan or urinal?   [ ] 1   [ ] 2   [ ] 3   [X] 4   4. Putting on and taking off regular upper body clothing?   [ ] 1   [ ] 2   [ ] 3   [X] 4   5. Taking care of personal grooming such as brushing teeth? [ ] 1   [ ] 2   [ ] 3   [X] 4   6. Eating meals? [ ] 1   [ ] 2   [ ] 3   [X] 4   © 2007, Trustees of [address] [place] [address] [address] [address], under license to [employer]. All rights reserved    Score:  Initial: 24 Most Recent: X (Date: -- )     Interpretation of Tool:  Represents activities that are increasingly more difficult (i.e. Bed mobility, Transfers, Gait). Score 24 23 22-20 19-15 14-10 9-7 6       Modifier CH CI CJ CK CL CM CN         · Self Care:               - CURRENT STATUS:    CH - 0% impaired, limited or restricted               - GOAL STATUS:           CH - 0% impaired, limited or restricted               - D/C STATUS:                       CH - 0% impaired, limited or restricted  [place] / Product Type: Medicare /       Medical Necessity:     · N/A. Reason for Services/Other Comments:  · N/A.    Use of outcome tool(s) and clinical judgement create a POC that gives a: LOW COMPLEXITY             TREATMENT:   (In addition to Assessment/Re-Assessment sessions the following treatments were rendered)      Pre-treatment Symptoms/Complaints:    Pain: Initial:   Pain Intensity 1: 6  Pain Location 1: Rectal  Pain Intervention(s) 1: Repositioned, Nurse notified  Post Session:  Same, RN is aware       Assessment/Reassessment only, no treatment provided today     Braces/Orthotics/Lines/Etc:   · O2 Device: Room air  Treatment/Session Assessment:    · Response to Treatment:  Tolerated well w/o complications  · Interdisciplinary Collaboration:  · Physical Therapist  · Occupational Therapist  · Registered Nurse  · After treatment position/precautions:  · Up in chair  · Bed/Chair-wheels locked  · Call light within reach  · RN notified  · Compliance with Program/Exercises: compliant all of the time today. · Recommendations/Intent for next treatment session: \"Next visit will focus on advancements to more challenging activities and reduction in assistance provided\".   Total Treatment Duration:  OT Patient Time In/Time Out  Time In: 0945  Time Out: [address]

## 2017-06-14 NOTE — PROGRESS NOTES
Dr. Amrik Sheth notified of pt ; 10 units Humulin R administered as ordered; no new orders at this time.

## 2017-06-14 NOTE — PROGRESS NOTES
Problem: Mobility Impaired (Adult and Pediatric)  Goal: *Acute Goals and Plan of Care (Insert Text)  No goals necessary. Mr. Luis Manuel Bishop is independent with mobility. He does an amazing job actually. 32 Alisa Stark PT      PHYSICAL THERAPY: INITIAL ASSESSMENT, DISCHARGE 6/14/2017  INPATIENT: Hospital Day: 2  [place] / Product Type: Medicare /      NAME/AGE/GENDER: Eugene Hinton is a 64 y.o. male   PRIMARY DIAGNOSIS: Perirectal fistula Perirectal fistula Perirectal fistula        ICD-10: Treatment Diagnosis:       · Generalized Muscle Weakness (M62.81)   Precaution/Allergies:  Contrast agent [iodine] and Restoril [temazepam]       ASSESSMENT:      Mr. Luis Manuel Bishop presents at baseline function. He is at a w/c level does a great job getting to his chair with assist for set up only. At this time there is no need for skilled PT. Commended him on how well he was moving considering his adversity with bilateral AKA. This section established at most recent assessment   PROBLEM LIST (Impairments causing functional limitations): 1. none    INTERVENTIONS PLANNED: (Benefits and precautions of physical therapy have been discussed with the patient.)  1. none      TREATMENT PLAN: Frequency/Duration: none  Rehabilitation Potential For Stated Goals: NONE      RECOMMENDED REHABILITATION/EQUIPMENT: (at time of discharge pending progress): None. HISTORY:   History of Present Injury/Illness (Reason for Referral):  Patient is an acutely and chronically ill appearing, frail, malnourished 64 y.o.  male who presented to ER this pm with approximately 2 week history of painful perianal area. He is accompanied by his wife, who reports it suddenly burst and began draining foul smelling thick, yellow pus. He has never had this problem in the past. Wound culture done by me in ER.  Patient dosing off and on throughout interview, adds minimal history, and is a very poor historian as is wife. She does report he had three episodes of diarrhea last week with jelly consistency, none this week. Also reports vomiting x 2 two days ago. Denies any melena, bright red bleeding, vomiting or hematochezia, although he did have a GI bleed with peptic ulcer greater than 20 years ago. Treated for UTi about three months ago, no symptoms at present. he formerly drank \"alot\" of alcohol, quit 2 years ago, has not had any since. He was diagnosed with diabetes about 8 years ago, been on insulin for about the same amount of time, takes lantus bid. He also takes humalog on a sliding scale, but neither of them knows what the scale is. They seem to have a poor understanding of diabetes. Wife states she checks patient' s glucose bid, usually around 420. A1C in November last year was 11.0. He also complains of generalized weakness, and vague pain in the arms. He had bilateral AKAs about 6 years ago after multiple bouts of lower extremity diabetic ulcerations and gangrene bilaterally and is wheelchair bound. Denies shortness of breath, chest pain, any additional symptoms. Additional history as noted below. He also began having seizures about three years ago, last seizure about one year ago. Patient and wife both do not know what kind of seizures he actually has. Past Medical History:         Past Medical History/Comorbidities:   Mr. Chava Graham  has a past medical history of Alcohol abuse, in remission (6/13/2017); Arthritis; CAD (coronary artery disease) (2008); Chronic hyponatremia (3/3/2014); Chronic pain; Dyslipidemia; GERD (gastroesophageal reflux disease); Hypercholesterolemia; Hypertension; Insulin dependent diabetes mellitus ([facility] (type 2 since 2005); Medical non-compliance (6/25/2012); MI (myocardial infarction) ([address] ( 2008);  Other postprocedural status (1/2/2014); PAD (peripheral artery disease) ([address] (10/5/2009); PUD (peptic ulcer disease); PVD (peripheral vascular disease) ([facility] Seizures ([facility] Sepsis ([facility] (2014); Thrombosis of Left External Iliac to above knee popliteal artery bypass graft (2010); and Tobacco abuse (10/5/2009). Mr. Ondina Gaspar  has a past surgical history that includes amputation toe,i-p jt (); cardiac surg procedure unlist (); thrombectomy (12); femoral bypass (12); amputation (Left, 2012); thrombectomy (10/22/2012); bypass graft othr,iliofemoral (); vascular surgery procedure unlist (2013); vascular surgery procedure unlist (2011); vascular surgery procedure unlist (2012); and above knee amputation (Right, ). Social History/Living Environment:   Home Environment: Private residence  One/Two Story Residence: One story  Support Systems: Spouse/Significant Other/Partner  Patient Expects to be Discharged to[de-identified] Private residence  Current DME Used/Available at Home: Wheelchair, Commode, bedside  Prior Level of Function/Work/Activity:  Independent at w/c level. He admits that he uses pull ups and wife changes him because his w/c does not fit into the bathroom. age, Bilateral AKA   Number of Personal Factors/Comorbidities that affect the Plan of Care: 1-2: MODERATE COMPLEXITY   EXAMINATION:   Most Recent Physical Functioning:   Gross Assessment:  AROM:  (upper extremities wnl)  Strength:  (upper extremities wnl)               Posture:     Balance:  Sitting: Intact Bed Mobility:  Rolling: Independent  Supine to Sit: Independent  Sit to Supine: Independent  Scooting: Independent  Wheelchair Mobility:     Transfers:  Bed to Chair: Modified independent  Gait:             Body Structures Involved:  1. None Body Functions Affected:  1. None Activities and Participation Affected:  1.  None   Number of elements that affect the Plan of Care: 1-2: LOW COMPLEXITY   CLINICAL PRESENTATION:   Presentation: Stable and uncomplicated: LOW COMPLEXITY   CLINICAL DECISION MAKIN [employer] Inpatient Short Form  How much difficulty does the patient currently have. .. Unable A Lot A Little None   1. Turning over in bed (including adjusting bedclothes, sheets and blankets)? [ ] 1   [ ] 2   [ ] 3   [X] 4   2. Sitting down on and standing up from a chair with arms ( e.g., wheelchair, bedside commode, etc.)   [X] 1   [ ] 2   [ ] 3   [ ] 4   3. Moving from lying on back to sitting on the side of the bed? [ ] 1   [ ] 2   [ ] 3   [X] 4   How much help from another person does the patient currently need. .. Total A Lot A Little None   4. Moving to and from a bed to a chair (including a wheelchair)? [ ] 1   [ ] 2   [X] 3   [ ] 4   5. Need to walk in hospital room? [X] 1   [ ] 2   [ ] 3   [ ] 4   6. Climbing 3-5 steps with a railing? [X] 1   [ ] 2   [ ] 3   [ ] 4   © 2007, Trustees of [address] [place] [address], under license to [employer]. All rights reserved    Score:  Initial: 14 Most Recent: X (Date: -- )     Interpretation of Tool:  Represents activities that are increasingly more difficult (i.e. Bed mobility, Transfers, Gait). Score 24 23 22-20 19-15 14-10 9-7 6       Modifier CH CI CJ CK CL CM CN         · Mobility - Walking and Moving Around:               - CURRENT STATUS:    CL - 60%-79% impaired, limited or restricted               - GOAL STATUS:           CL - 60%-79% impaired, limited or restricted               - D/C STATUS:                       CL - 60%-79% impaired, limited or restricted  [place] / Product Type: Medicare /       Medical Necessity:     · none. Reason for Services/Other Comments:  · NA.    Use of outcome tool(s) and clinical judgement create a POC that gives a: Clear prediction of patient's progress: LOW COMPLEXITY                 TREATMENT:   (In addition to Assessment/Re-Assessment sessions the following treatments were rendered)   Pre-treatment Symptoms/Complaints:  none  Pain: Initial:   Pain Intensity 1: 0  Post Session:  none      Assessment/Reassessment only, no treatment provided today     Braces/Orthotics/Lines/Etc:   · O2 Device: Room air  Treatment/Session Assessment:    · Response to Treatment:  good  · Interdisciplinary Collaboration:  · RN  · After treatment position/precautions:  · Up in chair  · Call light within reach  · RN notified  · Compliance with Program/Exercises: NA.  · Recommendations/Intent for next treatment session:  DC PT.   Total Treatment Duration:  PT Patient Time In/Time Out  Time In: 0900  Time Out: 0915     Ifrah Stark, PT

## 2017-06-14 NOTE — PROGRESS NOTES
Electronic MRI consent complete other than being signed by pt; day shift RN, Henrique Olguin notified. U/A completed ; see results review.

## 2017-06-14 NOTE — PROGRESS NOTES
Care Management Interventions  PCP Verified by CM: Yes  Transition of Care Consult (CM Consult): SNF  Physical Therapy Consult: Yes  Occupational Therapy Consult: Yes  Current Support Network: Relative's Home (currently staying with his daughter Shad Pulliam at [address])  Confirm Follow Up Transport: Family  Plan discussed with Pt/Family/Caregiver: Yes  Freedom of Choice Offered: Yes  Discharge Location  Discharge Placement: Skilled nursing facility     Met with patient and his () spouse and their son and his girlfriend. Patient allowed his spouse to do all of the talking. Mrs. Ernst Waters reports that she and her spouse lives at separate addresses. Patient lives with daughter Sherita Hernandez and his son and spouse are over daily assisting with his care. He has amputations and has never really used the prosthesis that were made for him. He has become more dependent and has lost strength and endurance. Patient asking for short term rehab at [facility]. He was there a few years ago. Traditional Medicare. PT and OT and PPD. Will refer. Sean Abad Gravely

## 2017-06-15 NOTE — PROGRESS NOTES
This patent is accepted to [facility] (his first choice) and may transfer Saturday if medically stable based on his three night Medicare stay.

## 2017-06-15 NOTE — PROGRESS NOTES
Problem: Nutrition Deficit  Goal: *Optimize nutritional status  Nutrition  Reason for assessment: Received nutrition consult for general nutrition management and supplements ( Cathy Nevarez NP)  Assessment:   Diet order(s): [facility]  Food/Nutrition History:  Pt says he is not getting enough to eat, and if he can't have more than he might as just well go home. Pt has been instructed on [facility] diet on multiple occasions (June 2011, June 2012, and November 2012), but has limited insight into his DM management. Pt with perirectal abscess and thus need for optimal BS control. Plan is for pt to be discahrged to a skilled nursing facility thus his DM can be monitored and managed closely. Anthropometrics:Height: 5' 8\" (172.7 cm),  Weight: 56.7 kg (125 lb)-unknown source. Note pt has bliateral AKA. Adjusted IBW for AKA: 117#  Macronutrient needs:  EER:  2461-5924 kcal /day (25-30 kcal/kg adjusted IBW). Suspect listed weight is not an actual weight. EPR:  64-80 grams protein/day (1.2-1.5 grams/kg IBW)  Intake/Comparative Standards: Per RD meal rounds: 100% of lunch and p is asking for more to eat. No other intake data available. 1 meal does not represenet a trend but based on hunger level , pt is potentially meeting >100% of kcal and >100% of protein needs     Nutrition Diagnosis: Increased kcal and protein needs r/t wound healing as evidenced by perirectal abscess  Intervention:  Meals and snacks: Continue current diet. Allow double protein and vegetable portion in an attempt to satisfy increased hunger. Explained to pt that increased hunger can be a sign of uncontrolled DM. Pt was not receptive and had limited insight into this aspect of DM management.      Mario Bruce, [address], [facility] [address] [place] [address], [address] [place] [address]

## 2017-06-15 NOTE — PROGRESS NOTES
Hospitalist Progress Note    6/15/2017  Admit Date: 2017  1:44 PM   NAME: Mendel Roch   :  1955   MRN:  [id number]   Attending: Yudelka Welch MD  PCP:  Kelton Baez MD    SUBJECTIVE:     Pt is a 63 yo male admitted  with pain to perianal area x 1-2 weeks with new finding of yellow thick pus. CT pelvis reveals perianal abscess and question of fistula. UA c/w UTI. No leukocytosis, no fever. He was started on Clindamycin and Rocephin. MRI r/o abscess and reveals a small intersphincteric perianal fistula, also reveals abnormality to prostate which question of neoplasm. This morning pt is resting in bed, states some perirectal pain but feels it is improving. He reports chronic issues with emptying his bladder. No current urinary burning or frequency. Hemodynamically stable. Review of Systems negative with exception of pertinent positives noted above  PHYSICAL EXAM     Visit Vitals    BP 99/56 (BP 1 Location: Right arm, BP Patient Position: At rest)    Pulse 71    Temp 97.6 °F (36.4 °C)    Resp 18    Ht 5' 8\" (1.727 m)    Wt 56.7 kg (125 lb)    SpO2 97%    BMI 19.01 kg/m2      Temp (24hrs), Av.8 °F (36.6 °C), Min:97.5 °F (36.4 °C), Max:98.2 °F (36.8 °C)    Oxygen Therapy  O2 Sat (%): 97 % (06/15/17 0701)  Pulse via Oximetry: 99 beats per minute (06/15/17 0408)  O2 Device: Room air (06/15/17 0701)    Intake/Output Summary (Last 24 hours) at 06/15/17 0902  Last data filed at 06/15/17 0851   Gross per 24 hour   Intake               41 ml   Output             1750 ml   Net            -1709 ml      General: No acute distress    Lungs:  CTA Bilaterally.    Heart:  Regular rate and rhythm,  No murmur, rub, or gallop  Abdomen: Soft, Non distended, Non tender, Positive bowel sounds  Extremities: No cyanosis, clubbing or edema  Neurologic:  No focal deficits    ASSESSMENT      Active Hospital Problems    Diagnosis Date Noted    Perirectal fistula 2017    Alcohol abuse, in remission 06/13/2017    Hypokalemia 06/13/2017    Nausea and vomiting 06/13/2017    Dementia 12/30/2014    Malnutrition ([facility] 12/30/2014    Status post above knee amputation ([facility] 11/21/2012     BILATERAL        DM (diabetes mellitus) type II uncontrolled, periph vascular disorder ([facility] 11/14/2012    Allergy to intravenous contrast 06/25/2012    Medical non-compliance 06/25/2012    CAD (coronary artery disease) 06/25/2012    PAD (peripheral artery disease) ([facility] 10/05/2009     10/24/12 Graft thrombectomy through leg incision of left iliac   to popliteal bypass and left popliteal to posterior tibial bypass -Dr. Malik Uriostegui    10/15/09 Left external Iliac artery to above the knee popliteal artery bypass graft propaten PTFE, stenting L EIA- Dr. Malik Uriostegui  10/5/09 Open amputation left 2nd, 3rd toes- Dr. Logan Palma  11/6/08 Left common femoral, superficial femoral and profunda femoris artery endarterectomy with saphenous vein patch angioplasty- Dr. Malik Uriostegui  10/30/08 Left gr toe amputation- Dr. Mary Brenner HTN (hypertension) 10/05/2009    Tobacco abuse 10/05/2009     QUIT        A/P:     Perianal fistula- General surgery reports no need for I&D. Cont Cleocin D 2. Prostate abdnormality- Question of prostatic neoplasm. PSA 0.8. Urology consult pending. UTI- Follow culture. Rocephin D 2. DM 2- A1C 11.2. Cont home regimen Lantus 10 units am, Lantus 25 units pm.  Humalog SSI with meals. Seizure disorder- Cont home regimen Pari Mckeon DC planning- SW following for STR placement.        DVT Prophylaxis: Heparin      Signed By: Dinah Villegas, MARY     Naty 15, 2017

## 2017-06-15 NOTE — CONSULTS
[address]       Name:  Ena Tyson   MR#:  [id number]   :  1955   Account #:  [de-identified]   Date of Adm:  2017       CONSULTING PHYSICIAN: Kateryna Ward MD    REASON FOR CONSULTATION: MRI of the pelvis abnormality. HISTORY OF ILLNESS: A 69-year-old male admitted with a   perirectal abscess. He gives a 2-week history of painful   perianal area with recent spontaneous drainage of thick, yellow   pus from the perianal area. He had a CT done on 2017   without contrast. This showed mild soft tissue phlegmon in the   posterior perirectal/perianal region. Small perirectal or   perianal abscess cannot be excluded. MRI was recommended for   further evaluation. He had an MRI on 2017 with and without contrast. This   showed no evidence of residual perianal abscess. Small,   intersphincteric perianal fistula extending to the 4 o'clock   position at the anorectal junction above the dentate line and   patchy T2 signal abnormality in the left peripheral zone of the   prostate, extending from mid gland apex. Cannot exclude   underlying primary prostatic neoplasm. Urology consultation was   recommended. The patient has a long history of debility. He has diabetes with   poor control. Hemoglobin A1c recently was about 11. On   admission, his blood sugar was up to 644, currently under better   control. The patient has peripheral vascular disease and coronary   disease, had previous above-the-knee amputation. In addition, he   is a very poor historian. His PSA during this admission is 0.8. PHYSICAL EXAMINATION   GENERAL: This is a very thin, malnourished-appearing male, lying   in bed, wanting to eat something. ABDOMEN: Soft, nontender. I cannot palpate or percuss a   distended bladder. GENITOURINARY: Penis is normal. Testes are without masses.     LABORATORY WORK: Urinalysis on admission: 5-10 WBC, 3-5 RBC,   moderate yeast, with urine culture showing 50 to 100,000 mixed   skin jay. ASSESSMENT   1. Perirectal abscess, which has spontaneously drained,   currently undergoing wound care and has been evaluated by   Surgery. 2. Abnormality of the prostate on MRI with normal PSA. 3. History of urinary retention, seen by Dr. Tennille Whelan at [facility]   in 2016. The patient denies any recent history of voiding. RECOMMENDATION: No treatment needed at this time. My suspicion   for prostate cancer is very low.  I would recommend that he be   followed up in 6-12 months, at which time we can get a repeat   PSA and possibly a digital rectal exam.        MD Renetta Jose / Chapin Oliva   D:  06/15/2017   13:22   T:  06/15/2017   13:47   Job #:  [id number]

## 2017-06-15 NOTE — PROGRESS NOTES
SURGERY:    Sitting up in bed. Feels well. \"Starving\" and wants more food. Reports jane-rectal pain improved. Continues to have drainage. Afebrile. States going to STR.      EXAM:    Wound: Open, drainage jane-rectal wound, no surrounding erythema, mildly ttp, no penis/scrotum edema      PLAN:    Continue management -- per Hospitalist  No surgical intervention required as area is open and draining  Local wound care -- per Wound RN    Will sign off  Call with questions    JOHN Lopez

## 2017-06-16 NOTE — PROGRESS NOTES
Pt stating he is still hungry after receiving double portions for breakfast.  Pt educated on his BGL and the effects certain foods have on the sugar.

## 2017-06-16 NOTE — PROGRESS NOTES
Report given to Karen Willis at [facility]. Wife to transport pt with paperwork. Room 111 D.

## 2017-06-16 NOTE — DISCHARGE SUMMARY
Physician Discharge Summary       Patient: Chloe Benavides MRN: [id number]  SSN: [id number]    YOB: 1955  Age: 64 y.o.   Sex: male    PCP: Val Irwin MD    Admit date: 6/13/2017  Admitting Provider: Santhosh Benavidez MD    Discharge date: 6/16/2017  Discharging Provider: Nini Welch MD    * Admission Diagnoses: Perirectal fistula    * Discharge Diagnoses:    Hospital Problems as of 6/16/2017  Date Reviewed: 6/13/2017          Codes Class Noted - Resolved POA    * (Principal)Perirectal fistula ICD-10-CM: K60.4  ICD-9-CM: 565.1  6/13/2017 - Present Yes        Alcohol abuse, in remission (Chronic) ICD-10-CM: F10.10  ICD-9-CM: 305.03  6/13/2017 - Present Yes        Hypokalemia ICD-10-CM: E87.6  ICD-9-CM: 276.8  6/13/2017 - Present Yes        Nausea and vomiting ICD-10-CM: R11.2  ICD-9-CM: 787.01  6/13/2017 - Present Yes        Dementia (Chronic) ICD-10-CM: F03.90  ICD-9-CM: 294.20  12/30/2014 - Present Yes        Malnutrition ([facility] [address] (Chronic) ICD-10-CM: E46  ICD-9-CM: 263.9  12/30/2014 - Present Yes        Status post above knee amputation ([facility] [address] (Chronic) ICD-10-CM: H60.021  ICD-9-CM: V49.76  11/21/2012 - Present Yes    Overview Signed 6/13/2017  5:29 PM by Scotty Stuart NP     BILATERAL               DM (diabetes mellitus) type II uncontrolled, periph vascular disorder ([facility] [address] (Chronic) ICD-10-CM: E11.51, E11.65  ICD-9-CM: 250.72, 443.81  11/14/2012 - Present Yes        Allergy to intravenous contrast (Chronic) ICD-10-CM: Z91.041  ICD-9-CM: V15.08  6/25/2012 - Present Yes        CAD (coronary artery disease) (Chronic) ICD-10-CM: I25.10  ICD-9-CM: 414.00  6/25/2012 - Present Yes        Medical non-compliance (Chronic) ICD-10-CM: Z91.19  ICD-9-CM: V15.81  6/25/2012 - Present Yes        PAD (peripheral artery disease) ([facility]) (Chronic) ICD-10-CM: I73.9  ICD-9-CM: 443.9  10/5/2009 - Present Yes    Overview Addendum 10/26/2012 10:21 AM by Stephanie Hein NP     10/24/12 Graft thrombectomy through leg incision of left iliac   to popliteal bypass and left popliteal to posterior tibial bypass -Dr. Jade Wiseman    10/15/09 Left external Iliac artery to above the knee popliteal artery bypass graft propaten PTFE, stenting L EIA- Dr. Jade Wiseman  10/5/09 Open amputation left 2nd, 3rd toes- Dr. Gomez Tariq  11/6/08 Left common femoral, superficial femoral and profunda femoris artery endarterectomy with saphenous vein patch angioplasty- Dr. Jade Wiseman  10/30/08 Left gr toe amputation- Dr. Jade Wiseman             HTN (hypertension) (Chronic) ICD-10-CM: I10  ICD-9-CM: 401.9  10/5/2009 - Present Yes        Tobacco abuse (Chronic) ICD-10-CM: Z72.0  ICD-9-CM: 305.1  10/5/2009 - Present Yes    Overview Signed 6/13/2017  5:28 PM by MARY Perez [facility] Course:     Mr. Carter Aleman is a 63 yo AAM, with a pmh of uncontrolled insulin dependent type 2 diabetes, CAD and PVD leading to bilateral AKAs, who presented 6/13 for perianal pain associated with an area that began draining foul smelling yellow pus and has been found to have a perirectal fistula. He has been evaluated by surgery who do not feel surgical intervention is warranted. He has been on rocephin and IV cleocin and his white count elevation and fever have resolved. He states he is feeling much better and will be discharged on oral vantin and clindamycin to complete his antibiotic course. His diabetes is uncontrolled and his lantus doses have been adjusted. Of note, his prostate appeared abnormal on MRI and urology evaluated patient and they have a low suspicion for malignancy and would like to follow up with him in one year. Mr. Carter Aleman is now ready for discharge to [facility] for [address]. Consults:     Urology  General Surgery    Significant Diagnostic Studies:     CT pelvis without contrast     History: pain, RECTAL PAIN and swelling, 61 years Male RECTAL PAIN and swelling  / NO ORAL OR IV CONTRAST PER DR. Sherryle Mitten.  History of severe IV dye allergy.     Comparison: CT abdomen June 06, 2013     TECHNIQUE: Oral contrast was not administered. 125 cc of nonionic intravenous  contrast was injected, and axial helical CT images were obtained from the level  of the iliac crests through the pelvis. Coronal reformatted images were obtained  at the scanner console and made available for review. Radiation dose reduction  techniques were used for this study: Our CT scanners use one or all of the  following: Automated exposure control, adjustment of the mA and/or kVp according  to patient's size, iterative reconstruction.     FINDINGS:     PELVIS:  Normal-appearing urinary bladder. Relatively normal-appearing prostate and  seminal vesicles on this noncontrast CT. There is mild phlegmonous appearing  soft tissue density in the perirectal/perianal region posteriorly, this is  suboptimally evaluated in the absence of rectal and IV contrast, a small  underlying perirectal/perianal abscess cannot be entirely excluded here. Sigmoid diverticula. Normal-appearing partially visualized small bowel, colon  and appendix. Severe calcific atherosclerosis of the distal abdominal aorta  with a vascular extent extending from the left common iliac artery through the  superficial femoral artery. No evidence of pelvic free fluid. No evidence of  significant inguinal or pelvic sidewall lymphadenopathy. Visualized osseous  structures unremarkable.     IMPRESSION  IMPRESSION:      1. Mild soft tissue phlegmon in the posterior perirectal/perianal region,  suboptimally evaluated here, however a small perirectal/perianal abscess cannot  be excluded. Elective outpatient MRI of the pelvis with and without contrast  perianal fistula protocol may be helpful in further evaluation.   2. Other incidental findings as above.       MRI pelvis with and without contrast     History: Pt is a 65 yo male admitted 06/13 with pain to perianal area x 1-2  weeks with new finding of yellow thick pus. CT pelvis reveals perianal abscess  and question of fistula. UA c/w UTI. No leukocytosis, no fever. He was started  on Clindamycin. Allergic to IV contrast. Patient was pre medicated  Creatinine 0.66  10 ml multihance     Comparison: CT pelvis June 13, 2017     Technique: MRI of the pelvis performed at 1.5 [employer] GE whole-body scanner. Phased surface coil arrays were used to image the pelvis. Body coil localizer  image was performed in the coronal plane. Pelvic images were performed in the  coronal, axial, and sagittal planes in the pelvis before and after uneventful  intravenous administration of 10 cc MultiHance gadolinium. Delayed axial images  were subsequently performed.     Findings: Packing material is seen in the posterior perianal region, patient  appears to be status post interval debridement of the previously questioned  perirectal/perianal abscess. There is no definite evidence of residual perianal  abscess collection. However, there does appear to be a small linear T2  hyperintense line tracking between the internal and external sphincteric  muscles, extending from the left perianal region superiorly to the left aspect  of the anorectal junction above the dentate line at approximately 4 o'clock  position, this most likely represents a small intersphincteric perianal fistula. There is patchy T2 signal abnormality in the left peripheral zone extending  from mid gland to apex, associated with heterogeneous hyperenhancement,  underlying neoplasm cannot be excluded, correlation with PSA is recommended. Layering debris is seen within the distended urinary bladder. Persistent trace  pelvic free fluid. No evidence of significant inguinal or pelvic sidewall  lymphadenopathy. Visualized osseous structures unremarkable.     IMPRESSION  Impression:   1. No evidence of residual perianal abscess, patient appears to be status post  interval debridement and packing.   2. Findings most likely represent a small left intersphincteric perianal  fistula extending to approximately the 4 o'clock position of the anal rectal  junction above the dentate line. 3. Patchy T2 signal abnormality in the left peripheral zone of the prostate  extending from mid gland to apex, cannot exclude underlying primary prostatic  neoplasm, correlation with PSA is recommended. Elective urology consultation  may be helpful. 4. Other incidental findings as above. Discharge Exam:    General: awake, alert, oriented, no acute distress, frail, malnourished appearing  Eyes: non icteric, EOMI  Neck; supple  CV: RRR  Abd; soft, non tender, non distended  Ext: bilateral AKAs with well healed stumps    * Discharge Condition: stable  * Disposition: STR at [facility]    Discharge Medications:  Current Discharge Medication List      START taking these medications    Details   HYDROcodone-acetaminophen (NORCO) 5-325 mg per tablet Take 1 Tab by mouth every four (4) hours as needed. Max Daily Amount: 6 Tabs. Qty: 15 Tab, Refills: 0      polyethylene glycol (MIRALAX) 17 gram packet Take 1 Packet by mouth daily as needed. Qty: 30 Each, Refills: 0      senna-docusate (PERICOLACE) 8.6-50 mg per tablet Take 2 Tabs by mouth daily. Qty: 30 Tab, Refills: 0      clindamycin (CLEOCIN) 300 mg capsule Take 1 Cap by mouth three (3) times daily for 7 days. Qty: 21 Cap, Refills: 0      cefpodoxime (VANTIN) 200 mg tablet Take 1 Tab by mouth two (2) times a day for 7 days. Qty: 14 Tab, Refills: 0         CONTINUE these medications which have CHANGED    Details   !! insulin glargine (LANTUS) 100 unit/mL injection 20 Units by SubCUTAneous route every morning. Qty: 1 Vial, Refills: 0      !! insulin glargine (LANTUS) 100 unit/mL injection 35 Units by SubCUTAneous route nightly. Qty: 1 Vial, Refills: 0       !! - Potential duplicate medications found. Please discuss with provider.       CONTINUE these medications which have NOT CHANGED    Details   lacosamide (VIMPAT) 100 mg tab tablet Take 100 mg by mouth two (2) times a day.      gabapentin (NEURONTIN) 300 mg capsule Take 2 Caps by mouth three (3) times daily. Qty: 180 Cap, Refills: 3      levETIRAcetam (KEPPRA) 500 mg tablet Take 1 Tab by mouth two (2) times a day. Qty: 60 Tab, Refills: 3      atorvastatin (LIPITOR) 80 mg tablet Take 80 mg by mouth daily. aspirin delayed-release 81 mg tablet Take 81 mg by mouth daily. lisinopril (PRINIVIL, ZESTRIL) 2.5 mg tablet Take 1 Tab by mouth daily. Qty: 30 Tab, Refills: 5      ranitidine (ZANTAC) 150 mg tablet Take 150 mg by mouth two (2) times a day. STOP taking these medications       insulin lispro (HUMALOG) 100 unit/mL injection Comments:   Reason for Stopping:               * Follow-up Care/Patient Instructions: Activity: as directed by PT  Diet: diabetic  Wound Care: Perianal wound care    Follow-up Information     Follow up With Details Comments [address] [place] [address] Taty Leyva MD Schedule an appointment as soon as possible for a visit in 1 week  [address] [facility] [address]  [phone]      Rochelle Alcocer MD On 6/1/2018  [address] [place] [address] [phone]          35 minutes spent in discharge planning and coordination of care.      Signed:  Gaye Davis MD  6/16/2017  9:07 AM

## 2017-06-16 NOTE — PROGRESS NOTES
Dc to [facility] today. Spouse to transport and patient very receptive to the plan. Miscalculated yesterday and patient has had his three night stay last night. Daily wound care and rehab. Jb Sainz

## 2017-06-27 PROBLEM — J18.9 HCAP (HEALTHCARE-ASSOCIATED PNEUMONIA): Status: ACTIVE | Noted: 2017-01-01

## 2017-06-27 PROBLEM — E16.2 HYPOGLYCEMIA: Status: ACTIVE | Noted: 2017-01-01

## 2017-06-27 NOTE — ED NOTES
Spoke with Dr. Gabino Varela r/t order for Vancomycin remains in system, Dr. Gabino Varela stated to discontinue vancomycin. Order entered into system .

## 2017-06-27 NOTE — ED NOTES
Patient medicated per ordered with medication on hand in ED pyxis. Patient requesting lunch. Patient BGL 46. Patient given mal tray and given 4 oz apple juice immediately. Will call pharmacy for additional medications and continue to monitor patient.

## 2017-06-27 NOTE — ED NOTES
TRANSFER - OUT REPORT:    Verbal report given to Jennifer Whitfield RN(name) on Junaid Orn     Report consisted of patients Situation, Background, Assessment and   Recommendations(SBAR). Information from the following report(s) ED Summary was reviewed with the receiving nurse. Lines:       Opportunity for questions and clarification was provided.

## 2017-06-27 NOTE — ED PROVIDER NOTES
HPI Comments: 57 yo male from [facility] rehab for hypoglycemia. BS 38 at facility. Patient has a history of insulin-dependent diabetes, coronary artery disease and peripheral vascular disease leading to bilateral AKA's. Patient was placed in rehabilitation for a perirectal fistula. Apparently surgery did not feel surgical intervention was not warranted and he was treated in the hospital with Rocephin and clindamycin. His Lantus dose was adjusted while in the hospital.  At time of my evaluation the patient has no complaints. Patient has a history of chronic dementia is unclear what his baseline is. Denies any significant fevers, vomiting, shortness of breath. Patient does report a cough and was noted to be hypoxic on room air. Patient is a 58 y.o. male presenting with hypoglycemia. The history is provided by the patient and the EMS personnel. No  was used. Low Blood Sugar    This is a new problem. Past Medical History:   Diagnosis Date    Alcohol abuse, in remission 6/13/2017    Arthritis     CAD (coronary artery disease) 2008    MI 2006 with stent, stent 2008    Chronic hyponatremia 3/3/2014    Chronic pain     generalized    Dyslipidemia     GERD (gastroesophageal reflux disease)     Hypercholesterolemia     Hypertension     under control with med    Insulin dependent diabetes mellitus ([address] type 2 since 2005    insulin reliant.  bs: \"120'S\" BUT RUNS HIGH    Medical non-compliance 6/25/2012    MI (myocardial infarction) ([address]  2008    EF 55%    Other postprocedural status 1/2/2014 07/03/2013: S/P Right common femoral endarterectomy     PAD (peripheral artery disease) ([address] 10/5/2009    10/24/12 Graft thrombectomy through leg incision of left iliac  to popliteal bypass and left popliteal to posterior tibial bypass -Dr. Chris Caravlho  10/15/09 Left external Iliac artery to above the knee popliteal artery bypass graft propaten PTFE, stenting L EIA- Dr. Chris Carvalho 10/5/09 Open amputation left 2nd, 3rd toes- Dr. Evette Scherer 11/6/08 Left common femoral, superficial femoral and profunda femoris art    PUD (peptic ulcer disease)     P[facility]peripheral vascular disease) [facility]     Seizures ([facility]     x1 about 2010?  Sepsis ([facility] 2/26/2014    Thrombosis of Left External Iliac to above knee popliteal artery bypass graft 9/23/2010    Tobacco abuse 10/5/2009       Past Surgical History:   Procedure Laterality Date    AMPUTATION TOE,I-P JT  2008    3 toes on left foot    BYPASS GRAFT OTHR,ILIOFEMORAL  2009    left     CARDIAC SURG PROCEDURE UNLIST  2009    2-3 stents    HX ABOVE KNEE AMPUTATION Right 2014    HX AMPUTATION Left 11/14/2012    Left leg above the knee amputation     HX FEMORAL BYPASS  8/29/12    OCCLUDED GRAFT    HX THROMBECTOMY  7/02/12    Graft thrombectomy, left common iliac artery angioplasty and stent, left popliteal artery balloon angioplasty    HX THROMBECTOMY  10/22/2012    Graft thrombectomy through leg incision of left iliac to popliteal bypass and left popliteal to posterior tibial bypass    VASCULAR SURGERY PROCEDURE UNLIST  07/03/2013    R common femoral endarterectomy, re-do groin dissection    VASCULAR SURGERY PROCEDURE UNLIST  06/13/2011    R common femoral endarterectomy, left common and external iliac artery balloon angioplasty,  selective catheterization of left external iliac artery from right common femoral artery    VASCULAR SURGERY PROCEDURE UNLIST  06/25/2012    L Femoral-popliteal graft thrombectomy         Family History:   Problem Relation Age of Onset    Stroke Mother     Hypertension Mother     Diabetes Other        Social History     Social History    Marital status: LEGALLY      Spouse name: N/A    Number of children: N/A    Years of education: N/A     Occupational History    Not on file.      Social History Main Topics    Smoking status: Current Every Day Smoker     Packs/day: 0.25     Years: 46.00    Smokeless tobacco: Never Used    Alcohol use 2.0 oz/week     4 Standard drinks or equivalent per week    Drug use: Yes     Special: Marijuana      Comment: no longer uses    Sexual activity: Not on file     Other Topics Concern    Not on file     Social History Narrative         ALLERGIES: Contrast agent [iodine] and Restoril [temazepam]    Review of Systems   Constitutional: Negative for fever. HENT: Negative for congestion. Respiratory: Positive for cough. Negative for shortness of breath. Gastrointestinal: Negative for abdominal pain, nausea and vomiting. Genitourinary: Negative for flank pain. Musculoskeletal: Negative for back pain. Neurological: Negative for headaches. Vitals:    06/27/17 0732 06/27/17 0736   BP: 104/59    Pulse: 60    Resp: 16    SpO2: (!) 88% 96%   Weight: 40.8 kg (90 lb)    Height: 5' 8\" (1.727 m)             Physical Exam   Constitutional: He is oriented to person, place, and time. He appears well-developed and well-nourished. No distress. HENT:   Head: Normocephalic and atraumatic. Eyes: Conjunctivae and EOM are normal. Pupils are equal, round, and reactive to light. Neck: Normal range of motion. Neck supple. Cardiovascular: Normal rate, regular rhythm and normal heart sounds. Pulmonary/Chest: Effort normal and breath sounds normal. No respiratory distress. He has no wheezes. He has no rales. Occasional coughing noted  Slightly decreased breath sounds bilaterally   Abdominal: Soft. He exhibits no distension. There is no tenderness. There is no rebound. Musculoskeletal: Normal range of motion. He exhibits no edema or tenderness. Neurological: He is alert and oriented to person, place, and time. Skin: Skin is warm and dry. No rash noted. He is not diaphoretic. Psychiatric: He has a normal mood and affect. His behavior is normal.   Vitals reviewed.        MDM  Number of Diagnoses or Management Options  Anemia, unspecified type: new and does not require workup  Hospital-acquired pneumonia: new and does not require workup  Diagnosis management comments: X-ray demonstrates a right lower lobe pneumonia in the setting of what appears to be new hypoxia. We'll treat for hospital-acquired pneumonia and admitted to hospitalist service. White count also seems to be elevated compared to several days ago. Admitted in stable condition. BNP ordered.        Amount and/or Complexity of Data Reviewed  Clinical lab tests: ordered and reviewed (Results for orders placed or performed during the hospital encounter of 06/27/17  -CULTURE, BLOOD       Result                                            Value                         Ref Range                       Special Requests:                                 RIGHT FOREARM                                                 Culture result:                                   PENDING                                                  -CBC WITH AUTOMATED DIFF       Result                                            Value                         Ref Range                       WBC                                               13.5 (H)                      4.3 - 11.1 K/uL                 RBC                                               2.75 (L)                      4.23 - 5.67 M/uL                HGB                                               7.6 (L)                       13.6 - 17.2 g/dL                HCT                                               22.6 (L)                      41.1 - 50.3 %                   MCV                                               82.2                          79.6 - 97.8 FL                  MCH                                               27.6                          26.1 - 32.9 PG                  MCHC                                              33.6                          31.4 - 35.0 g/dL                RDW                                               21.2 (H)                      11.9 - 14.6 % PLATELET                                          297                           150 - 450 K/uL                  MPV                                               8.7 (L)                       10.8 - 14.1 FL                  DF                                                AUTOMATED                                                     NEUTROPHILS                                       73                            43 - 78 %                       LYMPHOCYTES                                       16                            13 - 44 %                       MONOCYTES                                         9                             4.0 - 12.0 %                    EOSINOPHILS                                       1                             0.5 - 7.8 %                     BASOPHILS                                         0                             0.0 - 2.0 %                     IMMATURE GRANULOCYTES                             0.8                           0.0 - 5.0 %                     ABS. NEUTROPHILS                                  9.9 (H)                       1.7 - 8.2 K/UL                  ABS. LYMPHOCYTES                                  2.1                           0.5 - 4.6 K/UL                  ABS. MONOCYTES                                    1.2                           0.1 - 1.3 K/UL                  ABS. EOSINOPHILS                                  0.2                           0.0 - 0.8 K/UL                  ABS. BASOPHILS                                    0.0                           0.0 - 0.2 K/UL                  ABS. IMM.  GRANS.                                  0.1                           0.0 - 0.5 K/UL             -METABOLIC PANEL, COMPREHENSIVE       Result                                            Value                         Ref Range                       Sodium                                            133 (L)                       136 - 145 mmol/L Potassium                                         4.9                           3.5 - 5.1 mmol/L                Chloride                                          102                           98 - 107 mmol/L                 CO2                                               23                            21 - 32 mmol/L                  Anion gap                                         8                             7 - 16 mmol/L                   Glucose                                           173 (H)                       65 - 100 mg/dL                  BUN                                               51 (H)                        8 - 23 MG/DL                    Creatinine                                        0.81                          0.8 - 1.5 MG/DL                 GFR est AA                                        >60                           >60 ml/min/1.73m2               GFR est non-AA                                    >60                           >60 ml/min/1.73m2               Calcium                                           8.2 (L)                       8.3 - 10.4 MG/DL                Bilirubin, total                                  0.2                           0.2 - 1.1 MG/DL                 ALT (SGPT)                                        183 (H)                       12 - 65 U/L                     AST (SGOT)                                        153 (H)                       15 - 37 U/L                     Alk. phosphatase                                  181 (H)                       50 - 136 U/L                    Protein, total                                    8.1                           6.3 - 8.2 g/dL                  Albumin                                           2.6 (L)                       3.2 - 4.6 g/dL                  Globulin                                          5.5 (H)                       2.3 - 3.5 g/dL                  A-G Ratio 0.5 (L)                       1.2 - 3.5                  -BNP       Result                                            Value                         Ref Range                       BNP                                               884                           pg/mL                      -GLUCOSE, POC       Result                                            Value                         Ref Range                       Glucose (POC)                                     208 (H)                       65 - 100 mg/dL             -POC LACTIC ACID       Result                                            Value                         Ref Range                       Lactic Acid (POC)                                 1.7                           0.5 - 1.9 mmol/L           )  Tests in the radiology section of CPT®: ordered and reviewed (Xr Chest Pa Lat    Result Date: 6/27/2017  2 View Chest X-Ray 6/27/2017 8:33 AM INDICATION: Poorly controlled diabetes, cough COMPARISON: 12/27/2014 FINDINGS: Upright AP and Lateral views are submitted. The cardiac and mediastinal contours are normal. Lungs are adequately inflated and the left lung clear. In the right lung there are clearly increased hazy and reticular lung densities. There is slight pleural thickening laterally. No pneumothorax. Vascularity seems appropriate. IMPRESSION: Suspicious for right pneumonia, in the differential is asymmetric pulmonary edema. Correlate clinically. Ct Chest Wo Cont    Result Date: 6/27/2017  CT CHEST WITHOUT CONTRAST 6/27/2017 HISTORY: Mild hypoxia in the ER. Right-sided densities on chest radiograph. TECHNIQUE: Noncontrast axial images were obtained through the chest. COMPARISON: PA lateral chest x-ray from the same day FINDINGS: Bilateral pleural effusions are present. Consolidation is present in the right lower lobe. Paraseptal emphysematous changes are present in the upper lobes.  There is smooth thickening of the interstitial markings in the lung apices. This finding may be present with interstitial edema. The central airways are patent. There is a calcified granuloma in the periphery of the right upper lobe. A few calcified right hilar lymph nodes are present, suggesting prior granulomatous inflammation. Coronary artery atherosclerotic calcifications are present. Included images of the upper abdomen demonstrate normal-appearing adrenal glands and stones within the gallbladder. Multiple pancreatic head calcifications are present. This finding may be present with chronic pancreatitis. This is similar in appearance to CTA June 6, 2013. IMPRESSION: 1. Bilateral pleural effusions and right lower lobe consolidation. 2. Calcified pancreatic head mass. This appearance may be secondary to chronic pancreatitis.  Correlate with medical history and if indicated with a pancreatic protocol abdominal CT.  )  Review and summarize past medical records: yes  Discuss the patient with other providers: yes  Independent visualization of images, tracings, or specimens: yes    Risk of Complications, Morbidity, and/or Mortality  Presenting problems: high  Diagnostic procedures: high  Management options: high    Patient Progress  Patient progress: stable    ED Course       Procedures

## 2017-06-27 NOTE — IP AVS SNAPSHOT
Elsa Garcia 
 
 
 [address] [facility] [address] [facility] 
[phone] Patient: Ryan Wiseman MRN: [id number] JUAN FRANCISCO:[birth year] You are allergic to the following Allergen Reactions Contrast Agent (Iodine) Hives IVP DYE/Contrast, pt medicated with prednisone 50mg x 3 and still had hives, itching. DID WELL THE NEXT TIME WITHOUT ANY SYMPTOMS Restoril (Temazepam) Hives Recent Documentation Height Weight BMI Smoking Status 1.041 m 48.5 kg 44.75 kg/m2 Current Every Day Smoker Emergency Contacts Name Discharge Info Relation Home Work Mobile Heather Reyes  Child [2] [phone] Lupe Montez  Spouse [3] [phone] About your hospitalization You were admitted on:  June 27, 2017 You last received care in the:  [facility] You were discharged on:  July 2, 2017 Unit phone number:  [phone] Why you were hospitalized Your primary diagnosis was:  Hypoglycemia Your diagnoses also included:  Underweight, Dm (Diabetes Mellitus) Type Ii Uncontrolled, Periph Vascular Disorder (Hcc), Chronic Systolic Congestive Heart Failure (Hcc), S/P Aka (Above Knee Amputation) Bilateral (Hcc), Medical Non-Compliance, Malnutrition (Hcc), Etoh Abuse, Cad (Coronary Artery Disease), Acute Renal Failure (Hcc), Sepsis (Hcc), Jose (Acute Kidney Injury) (Hcc), Hyperkalemia, Acute Metabolic Encephalopathy, Elevated Liver Enzymes, Anemia, Hypoxemia, Tobacco Abuse, Pleural Effusion, Bilateral, Pulmonary Infiltrates, Hypotension Providers Seen During Your Hospitalizations Provider Role Specialty Primary office phone Akosua Alejandro MD Attending Provider Emergency Medicine [phone] Femi Shea MD Attending Provider Internal Medicine [phone] Your Primary Care Physician (PCP) Primary Care Physician Office Phone Office Fax [phone] [facility] [phone] Follow-up Information Follow up With Details Comments Contact Info [address] ([facility])   [address] [address] 
[phone] [address] Cardiology In 3 weeks  [address] [place] [address] [address] [facility] 
[phone] Fady Horton MD   [address] [facility] [address] 
[phone] Andria Magana MD In 1 week please call monday to schedule appt for one week. [address] [address] 
[phone] Current Discharge Medication List  
  
START taking these medications Dose & Instructions Dispensing Information Comments Morning Noon Evening Bedtime  
 carvedilol 6.25 mg tablet Commonly known as:  Kris Akers Your last dose was: Your next dose is:    
   
   
 Dose:  6.25 mg Take 1 Tab by mouth two (2) times daily (with meals). Quantity:  60 Tab Refills:  1  
     
   
   
   
  
 fluconazole 200 mg tablet Commonly known as:  DIFLUCAN Your last dose was: Your next dose is:    
   
   
 Dose:  200 mg Take 1 Tab by mouth daily for 8 days. Quantity:  8 Tab Refills:  0  
     
   
   
   
  
 furosemide 40 mg tablet Commonly known as:  LASIX Your last dose was: Your next dose is:    
   
   
 Dose:  40 mg Take 1 Tab by mouth daily. Quantity:  30 Tab Refills:  1  
     
   
   
   
  
 levoFLOXacin 750 mg tablet Commonly known as:  Julianna Don Your last dose was: Your next dose is:    
   
   
 Dose:  750 mg Take 1 Tab by mouth daily. Quantity:  4 Tab Refills:  0  
     
   
   
   
  
 nicotine 14 mg/24 hr patch Commonly known as:  Rick Walker Your last dose was: Your next dose is:    
   
   
 Dose:  1 Patch 1 Patch by TransDERmal route daily for 30 days. Quantity:  24 Patch Refills:  0  
     
   
   
   
  
 spironolactone 25 mg tablet Commonly known as:  ALDACTONE Your last dose was: Your next dose is:    
   
   
 Dose:  25 mg Take 1 Tab by mouth daily. Quantity:  30 Tab Refills:  1 CONTINUE these medications which have CHANGED Dose & Instructions Dispensing Information Comments Morning Noon Evening Bedtime  
 insulin glargine 100 unit/mL injection Commonly known as:  LANTUS What changed:  Another medication with the same name was removed. Continue taking this medication, and follow the directions you see here. Your last dose was: Your next dose is:    
   
   
 Dose:  20 Units 20 Units by SubCUTAneous route every morning. Quantity:  1 Vial  
Refills:  0  
     
   
   
   
  
 lisinopril 10 mg tablet Commonly known as:  Nas Noland What changed:   
- medication strength 
- how much to take Your last dose was: Your next dose is:    
   
   
 Dose:  10 mg Take 1 Tab by mouth daily. Quantity:  30 Tab Refills:  0 CONTINUE these medications which have NOT CHANGED Dose & Instructions Dispensing Information Comments Morning Noon Evening Bedtime  
 aspirin delayed-release 81 mg tablet Your last dose was: Your next dose is:    
   
   
 Dose:  81 mg Take 81 mg by mouth daily. Refills:  0  
     
   
   
   
  
 gabapentin 300 mg capsule Commonly known as:  NEURONTIN Your last dose was: Your next dose is:    
   
   
 Dose:  600 mg Take 2 Caps by mouth three (3) times daily. Quantity:  180 Cap Refills:  3 HYDROcodone-acetaminophen 5-325 mg per tablet Commonly known as:  Nelsy Eli Your last dose was: Your next dose is:    
   
   
 Dose:  1 Tab Take 1 Tab by mouth every four (4) hours as needed. Max Daily Amount: 6 Tabs. Quantity:  15 Tab Refills:  0  
     
   
   
   
  
 levETIRAcetam 500 mg tablet Commonly known as:  KEPPRA Your last dose was: Your next dose is:    
   
   
 Dose:  500 mg Take 1 Tab by mouth two (2) times a day. Quantity:  60 Tab Refills:  3 LIPITOR 80 mg tablet Generic drug:  atorvastatin Your last dose was: Your next dose is:    
   
   
 Dose:  80 mg Take 80 mg by mouth daily. Refills:  0  
     
   
   
   
  
 polyethylene glycol 17 gram packet Commonly known as:  Yesika Cancel Your last dose was: Your next dose is:    
   
   
 Dose:  17 g Take 1 Packet by mouth daily as needed. Quantity:  30 Each Refills:  0  
     
   
   
   
  
 raNITIdine 150 mg tablet Commonly known as:  ZANTAC Your last dose was: Your next dose is:    
   
   
 Dose:  150 mg Take 150 mg by mouth two (2) times a day. Refills:  0  
     
   
   
   
  
 senna-docusate 8.6-50 mg per tablet Commonly known as:  Demaris Lazaro Your last dose was: Your next dose is:    
   
   
 Dose:  2 Tab Take 2 Tabs by mouth daily. Quantity:  30 Tab Refills:  0 VIMPAT 100 mg Tab tablet Generic drug:  lacosamide Your last dose was: Your next dose is:    
   
   
 Dose:  100 mg Take 100 mg by mouth two (2) times a day. Refills:  0 Where to Get Your Medications These medications were sent to [address] [place] [address] Hours:  24-hours Phone:  [phone]  
  carvedilol 6.25 mg tablet  
 fluconazole 200 mg tablet  
 furosemide 40 mg tablet  
 levoFLOXacin 750 mg tablet  
 lisinopril 10 mg tablet  
 nicotine 14 mg/24 hr patch  
 spironolactone 25 mg tablet Discharge Instructions Learning About Urinary Catheter Care to Prevent Infection What is a urinary catheter? A urinary catheter is a flexible plastic tube used to drain urine from your bladder when you can't urinate on your own. The catheter allows urine to drain from the bladder into a bag. Two types of drainage bags may be used with a urinary catheter. · A bedside bag is a large bag that you can hang on the side of your bed or on a chair. You can use it overnight or anytime you will be sitting or lying down for a long time. · A leg bag is a small bag that you can use during the day. It is usually attached to your thigh or calf and hidden under your clothes. Having a urinary catheter increases your risk of getting a urinary tract infection. Germs may get on the catheter and cause an infection in your bladder or kidneys. The longer you have a catheter, the more likely it is that you will get an infection. You can help prevent this problem with good hygiene and careful handling of your catheter and drainage bags. How can you help prevent infection? Take care to be clean · Always wash your hands well before and after you handle your catheter. · Clean the skin around the catheter twice a day using soap and water. Dry with a clean towel afterward. You can shower with your catheter and drainage bag in place unless your doctor told you not to. · When you clean around the catheter, check the surrounding skin for signs of infection. Look for things like pus or irritated, swollen, red, or tender skin around the catheter. Be careful with your drainage bag · Always keep the drainage bag below the level of your bladder. This will help keep urine from flowing back into your bladder. · Check often to see that urine is flowing through the catheter into the drainage bag. · Empty the drainage bag when it is half full. This will keep it from overflowing or backing up. · When you empty the drainage bag, do not let the tubing or drain spout touch anything. Be careful with your catheter · Do not unhook the catheter from the drain tube. That could let germs get into the tube. · Make sure that the catheter tubing does not get twisted or kinked. · Do not tug or pull on the catheter. And make sure that the drainage bag does not drag or pull on the catheter. · Do not put powder or lotion on the skin around the catheter. · Talk with your doctor about your options for sexual intercourse while wearing a catheter. How do you empty a urine drainage bag? If your doctor has asked you to keep a record, write down the amount of urine in the bag before you empty it. Wash your hands before and after you touch the bag. 1. Remove the drain spout from its sleeve at the bottom of the drainage bag. 
2. Open the valve on the drain spout. Let the urine flow out into the toilet or a container. Be careful not to let the tubing or drain spout touch anything. 3. After you empty the bag, wipe off any liquid on the end of the drain spout. Close the valve. Then put the drain spout back into its sleeve at the bottom of the collection bag. How do you add a bedside bag to a leg bag? Wash your hands before and after you handle the bags. 1. Empty the leg bag attached to the catheter. 2. Put a clean towel under the leg bag. 
3. Use an alcohol wipe to clean the tip of the bedside bag. Then connect the bedside bag to the leg bag. How can you clean a bedside drainage bag? Many people clean their bedside bag in the morning if they switch to a leg bag. To clean a bedside drainage ba. Remove the bedside bag from the leg bag. 
2. Fill the bag with 2 parts vinegar and 3 parts water. Let it stand for 20 minutes. 3. Empty the bag, and let it air dry. When should you call for help? Call your doctor now or seek immediate medical care if: 
· You have symptoms of a urinary infection. These may include: 
¨ Pain or burning when you urinate. ¨ A frequent need to urinate without being able to pass much urine. ¨ Pain in the flank, which is just below the rib cage and above the waist on either side of the back. ¨ Blood in your urine. ¨ A fever. · Your urine smells bad. · You see large blood clots in your urine. · No urine or very little urine is flowing into the bag for 4 or more hours. Watch closely for changes in your health, and be sure to contact your doctor if: · The area around the catheter becomes irritated, swollen, red, or tender, or there is pus draining from it. · Urine is leaking from the place where the catheter enters your body. Follow-up care is a key part of your treatment and safety. Be sure to make and go to all appointments, and call your doctor if you are having problems. It's also a good idea to know your test results and keep a list of the medicines you take. Where can you learn more? Go to http://mario-roma.info/. Enter C910 in the search box to learn more about \"Learning About Urinary Catheter Care to Prevent Infection. \" Current as of: April 13, 2017 Content Version: 11.3 © 1891-0017 [employer]. Care instructions adapted under license by [employer] (which disclaims liability or warranty for this information). If you have questions about a medical condition or this instruction, always ask your healthcare professional. [address] any warranty or liability for your use of this information. Diabetes Blood Sugar Emergencies: Your Action Plan How can you prevent a blood sugar emergency? An important part of living with diabetes is keeping your blood sugar in your target range. You'll need to know what to do if it's too high or too low. Managing your blood sugar levels helps you avoid emergencies. This care sheet will teach you about the signs of high and low blood sugar. It will help you make an action plan with your doctor for when these signs occur. Low blood sugar is more likely to happen if you take certain medicines for diabetes. It can also happen if you skip a meal, drink alcohol, or exercise more than usual. 
You may get high blood sugar if you eat differently than you normally do. One example is eating more carbohydrate than usual. Having a cold, the flu, or other sudden illness can also cause high blood sugar levels. Levels can also rise if you miss a dose of medicine. Any change in how you take your medicine may affect your blood sugar level. So it's important to work with your doctor before you make any changes. Check your blood sugar Work with your doctor to fill in the blank spaces below that apply to you. Track your levels, know your target range, and write down ways you can get your blood sugar back in your target range. A log book can help you track your levels. Take the book to all of your medical appointments. · Check your blood sugar _____ times a day, at these times:________________________________________________. (For example: Before meals, at bedtime, before exercise, during exercise, other.) · Your blood sugar target range before a meal is ___________________. Your blood sugar target range after a meal is _______________________. · Do this___________________________________________________to get your blood sugar back within your safe range if your blood sugar results are _________________________________________. (For example: Less than 70 or above 250 mg/dL.) Call your doctor when your blood sugar results are ___________________________________. (For example: Less than 70 or above 250 mg/dL.) What are the symptoms of low and high blood sugar? Common symptoms of low blood sugar are sweating and feeling shaky, weak, hungry, or confused. Symptoms can start quickly. Common symptoms of high blood sugar are feeling very thirsty or very hungry.  You may also pass urine more often than usual. You may have blurry vision and may lose weight without trying. But some people may have high or low blood sugar without having any symptoms. That's a good reason to check your blood sugar on a regular schedule. What should you do if you have symptoms? Work with your doctor to fill in the blank spaces below that apply to you. Low blood sugar If you have symptoms of low blood sugar, check your blood sugar. If it's below _____ (for example, below 70), eat or drink a quick-sugar food that has about 15 grams of carbohydrate. Your goal is to get your level back to your safe range. Check your blood sugar again 15 minutes later. If it's still not in your target range, take another 15 grams of carbohydrate and check your blood sugar again in 15 minutes. Repeat this until you reach your target. Then go back to your regular testing schedule. When you have low blood sugar, it's best to stop or reduce any physical activity until your blood sugar is back in your target range and is stable. If you must stay active, eat or drink 30 grams of carbohydrate. Then check your blood sugar again in 15 minutes. If it's not in your target range, take another 30 grams of carbohydrates. Check your blood sugar again in 15 minutes. Keep doing this until you reach your target. You can then go back to your regular testing schedule. If your symptoms or blood sugar levels are getting worse or have not improved after 15 minutes, seek medical care right away. Here are some examples of quick-sugar foods with 15 grams of carbohydrate: · 3 or 4 glucose tablets · 1 tube of glucose gel · Hard candy (such as 3 Jolly Ranchers or 5 to [employer]) · ½ cup to ¾ cup (4 to 6 ounces) of fruit juice or regular (not diet) soda High blood sugar If you have symptoms of high blood sugar, check your blood sugar. Your goal is to get your level back to your target range. If it's above ______ (for example, above 250), follow these steps: · If you missed a dose of your diabetes medicine, take it now. Take only the amount of medicine that you have been prescribed. Do not take more or less medicine. · Give yourself insulin if your doctor has prescribed it for high blood sugar. · Test for ketones, if the doctor told you to do so. If the results of the ketone test show a moderate-to-large amount of ketones, call the doctor for advice. · Wait 30 minutes after you take the extra insulin or the missed medicine. Check your blood sugar again. If your symptoms or blood sugar levels are getting worse or have not improved after taking these steps, seek medical care right away. Follow-up care is a key part of your treatment and safety. Be sure to make and go to all appointments, and call your doctor if you are having problems. It's also a good idea to know your test results and keep a list of the medicines you take. Where can you learn more? Go to http://mario-roma.info/. Enter B499 in the search box to learn more about \"Diabetes Blood Sugar Emergencies: Your Action Plan. \" Current as of: March 13, 2017 Content Version: 11.3 © 8621-0310 [employer]. Care instructions adapted under license by [employer] (which disclaims liability or warranty for this information). If you have questions about a medical condition or this instruction, always ask your healthcare professional. [address] any warranty or liability for your use of this information. DISCHARGE SUMMARY from Nurse The following personal items are in your possession at time of discharge: 
 
Dental Appliances: None Visual Aid: None Home Medications: None Jewelry: None Clothing: Shirt, Pants, Undergarments, Footwear Other Valuables: None PATIENT INSTRUCTIONS: 
 
 
F-face looks uneven A-arms unable to move or move unevenly S-speech slurred or non-existent T-time-call 911 as soon as signs and symptoms begin-DO NOT go Back to bed or wait to see if you get better-TIME IS BRAIN. Warning Signs of HEART ATTACK Call 911 if you have these symptoms: 
? Chest discomfort. Most heart attacks involve discomfort in the center of the chest that lasts more than a few minutes, or that goes away and comes back. It can feel like uncomfortable pressure, squeezing, fullness, or pain. ? Discomfort in other areas of the upper body. Symptoms can include pain or discomfort in one or both arms, the back, neck, jaw, or stomach. ? Shortness of breath with or without chest discomfort. ? Other signs may include breaking out in a cold sweat, nausea, or lightheadedness. Don't wait more than five minutes to call [address]! Fast action can save your life. Calling 911 is almost always the fastest way to get lifesaving treatment. Emergency Medical Services staff can begin treatment when they arrive  up to an hour sooner than if someone gets to the hospital by car. The discharge information has been reviewed with the patient. The patient verbalized understanding. Discharge medications reviewed with the patient and appropriate educational materials and side effects teaching were provided. Discharge Orders None Introducing [facility] SERVICES! [employer] introduces [employer] patient portal. Now you can access parts of your medical record, email your doctor's office, and request medication refills online. 1. In your internet browser, go to [email or web address] 2. Click on the First Time User? Click Here link in the Sign In box. You will see the New Member Sign Up page. 3. Enter your SkuServe Access Code exactly as it appears below. You will not need to use this code after youve completed the sign-up process. If you do not sign up before the expiration date, you must request a new code. · SkuServe Access Code: [email or web address] Expires: 9/12/2017  9:57 AM 
 
4. Enter the last four digits of your Social Security Number (xxxx) and Date of Birth (mm/dd/yyyy) as indicated and click Submit. You will be taken to the next sign-up page. 5. Create a SkuServe ID. This will be your SkuServe login ID and cannot be changed, so think of one that is secure and easy to remember. 6. Create a SkuServe password. You can change your password at any time. 7. Enter your Password Reset Question and Answer. This can be used at a later time if you forget your password. 8. Enter your e-mail address. You will receive e-mail notification when new information is available in [address] 9. Click Sign Up. You can now view and download portions of your medical record. 10. Click the Download Summary menu link to download a portable copy of your medical information. If you have questions, please visit the Frequently Asked Questions section of the SkuServe website. Remember, SkuServe is NOT to be used for urgent needs. For medical emergencies, dial 911. Now available from your iPhone and Android! General Information Please provide this summary of care documentation to your next provider. Patient Signature:  ____________________________________________________________ Date:  ____________________________________________________________  
  
Yaima Melgaramento Provider Signature:  ____________________________________________________________ Date:  ____________________________________________________________

## 2017-06-27 NOTE — PROGRESS NOTES
Skin assessment shows bilateral AKA. Small area of c/o friction near sacrum. mepilex applied. Back with multiple moles. PIV in BUE. Otherwise in tact. With LEIGHANN sánchez.

## 2017-06-27 NOTE — ED NOTES
Pt resting comfortably in bed at this time. No acute distress noted. Watching TV. Call light in reach. Will continue to monitor.

## 2017-06-27 NOTE — H&P
HOSPITALIST H&P/CONSULT  NAME:  Shelby Guo   Age:  58 y.o.  :   1955   MRN:   [id number]  PCP: Nitin Mallory MD  Consulting MD:  Treatment Team: Attending Provider: Shad Esteban MD; Primary Nurse: Kasia Gonzalez  HPI:   Patient is a 58 yom with a hx of dm, severe pvd s/p bilateral aka who presents from snf with hypoglycemia reportedly in 35s. Pt given dextrose by EMS and glucose improved on admission to ER. Pt states he didn't eat much yesterday. Denies fevers/chills, Ha, nausea, vomiting, diarrhea, cp, sob. Pt found to have mild hypoxia in ER and cxr with rt sided densities. Started on vanco and zosyn in ER    Complete ROS done and is as stated in HPI or otherwise negative  Past Medical History:   Diagnosis Date    Alcohol abuse, in remission 2017    Arthritis     CAD (coronary artery disease)     MI 2006 with stent, stent     Chronic hyponatremia 3/3/2014    Chronic pain     generalized    Dyslipidemia     GERD (gastroesophageal reflux disease)     Hypercholesterolemia     Hypertension     under control with med    Insulin dependent diabetes mellitus ([address] type 2 since     insulin reliant. bs: \"120'S\" BUT RUNS HIGH    Medical non-compliance 2012    MI (myocardial infarction) ([address]      EF 55%    Other postprocedural status 2013: S/P Right common femoral endarterectomy     PAD (peripheral artery disease) ([address] 10/5/2009    10/24/12 Graft thrombectomy through leg incision of left iliac  to popliteal bypass and left popliteal to posterior tibial bypass -Dr. Stanley Talley  10/15/09 Left external Iliac artery to above the knee popliteal artery bypass graft propaten PTFE, stenting L EIA- Dr. Stanley Talley 10/5/09 Open amputation left 2nd, 3rd toes- Dr. Meg Gurrola 08 Left common femoral, superficial femoral and profunda femoris art    PUD (peptic ulcer disease)     PVD (peripheral vascular disease) ([address]     Seizures ([address]     x1 about ?  Sepsis ([facility] 2014    Thrombosis of Left External Iliac to above knee popliteal artery bypass graft 2010    Tobacco abuse 10/5/2009      Past Surgical History:   Procedure Laterality Date    AMPUTATION TOE,I-P JT      3 toes on left foot    BYPASS GRAFT OTHR,ILIOFEMORAL  2009    left     CARDIAC SURG PROCEDURE UNLIST      2-3 stents    HX ABOVE KNEE AMPUTATION Right 2014    HX AMPUTATION Left 2012    Left leg above the knee amputation     HX FEMORAL BYPASS  12    OCCLUDED GRAFT    HX THROMBECTOMY  12    Graft thrombectomy, left common iliac artery angioplasty and stent, left popliteal artery balloon angioplasty    HX THROMBECTOMY  10/22/2012    Graft thrombectomy through leg incision of left iliac to popliteal bypass and left popliteal to posterior tibial bypass    VASCULAR SURGERY PROCEDURE UNLIST  2013    R common femoral endarterectomy, re-do groin dissection    VASCULAR SURGERY PROCEDURE UNLIST  2011    R common femoral endarterectomy, left common and external iliac artery balloon angioplasty,  selective catheterization of left external iliac artery from right common femoral artery    VASCULAR SURGERY PROCEDURE UNLIST  2012    L Femoral-popliteal graft thrombectomy      Prior to Admission Medications   Prescriptions Last Dose Informant Patient Reported? Taking? HYDROcodone-acetaminophen (NORCO) 5-325 mg per tablet   No No   Sig: Take 1 Tab by mouth every four (4) hours as needed. Max Daily Amount: 6 Tabs. aspirin delayed-release 81 mg tablet   Yes No   Sig: Take 81 mg by mouth daily. atorvastatin (LIPITOR) 80 mg tablet   Yes No   Sig: Take 80 mg by mouth daily. gabapentin (NEURONTIN) 300 mg capsule   No No   Sig: Take 2 Caps by mouth three (3) times daily. insulin glargine (LANTUS) 100 unit/mL injection   No No   Si Units by SubCUTAneous route every morning.    insulin glargine (LANTUS) 100 unit/mL injection   No No   Si Units by SubCUTAneous route nightly. lacosamide (VIMPAT) 100 mg tab tablet   Yes No   Sig: Take 100 mg by mouth two (2) times a day. levETIRAcetam (KEPPRA) 500 mg tablet   No No   Sig: Take 1 Tab by mouth two (2) times a day. lisinopril (PRINIVIL, ZESTRIL) 2.5 mg tablet   No No   Sig: Take 1 Tab by mouth daily. polyethylene glycol (MIRALAX) 17 gram packet   No No   Sig: Take 1 Packet by mouth daily as needed. ranitidine (ZANTAC) 150 mg tablet   Yes No   Sig: Take 150 mg by mouth two (2) times a day. senna-docusate (PERICOLACE) 8.6-50 mg per tablet   No No   Sig: Take 2 Tabs by mouth daily. Facility-Administered Medications: None     Allergies   Allergen Reactions    Contrast Agent [Iodine] Hives     IVP DYE/Contrast, pt medicated with prednisone 50mg x 3 and still had hives, itching. DID WELL THE NEXT TIME WITHOUT ANY SYMPTOMS    Restoril [Temazepam] Hives      Social History   Substance Use Topics    Smoking status: Current Every Day Smoker     Packs/day: 0.25     Years: 46.00    Smokeless tobacco: Never Used    Alcohol use 2.0 oz/week     4 Standard drinks or equivalent per week      Family History   Problem Relation Age of Onset    Stroke Mother     Hypertension Mother     Diabetes Other       Objective:     Visit Vitals    /59    Pulse 60    Resp 16    Ht 5' 8\" (1.727 m)    Wt 40.8 kg (90 lb)    SpO2 96%    BMI 13.68 kg/m2      No data recorded. Oxygen Therapy  O2 Sat (%): 96 % (06/27/17 0736)  O2 Device: Nasal cannula (06/27/17 0736)  O2 Flow Rate (L/min): 2 l/min (06/27/17 0736)  Physical Exam:  General:    Alert, cooperative, no distress, appears stated age. Head:   Normocephalic, without obvious abnormality, atraumatic. Nose:  Nares normal. No drainage or sinus tenderness. Lungs:   Coarse bs bilat  Heart:   Regular rate and rhythm,  no murmur, rub or gallop. Abdomen:   Soft, non-tender. Not distended.   Bowel sounds normal.   Extremities: bilat aka  Skin: Texture, turgor normal. No rashes or lesions. Not Jaundiced  Neurologic: Alert and oriented x 3  Data Review:   Recent Results (from the past 24 hour(s))   CBC WITH AUTOMATED DIFF    Collection Time: 06/27/17  7:50 AM   Result Value Ref Range    WBC 13.5 (H) 4.3 - 11.1 K/uL    RBC 2.75 (L) 4.23 - 5.67 M/uL    HGB 7.6 (L) 13.6 - 17.2 g/dL    HCT 22.6 (L) 41.1 - 50.3 %    MCV 82.2 79.6 - 97.8 FL    MCH 27.6 26.1 - 32.9 PG    MCHC 33.6 31.4 - 35.0 g/dL    RDW 21.2 (H) 11.9 - 14.6 %    PLATELET 610 626 - 062 K/uL    MPV 8.7 (L) 10.8 - 14.1 FL    DF AUTOMATED      NEUTROPHILS 73 43 - 78 %    LYMPHOCYTES 16 13 - 44 %    MONOCYTES 9 4.0 - 12.0 %    EOSINOPHILS 1 0.5 - 7.8 %    BASOPHILS 0 0.0 - 2.0 %    IMMATURE GRANULOCYTES 0.8 0.0 - 5.0 %    ABS. NEUTROPHILS 9.9 (H) 1.7 - 8.2 K/UL    ABS. LYMPHOCYTES 2.1 0.5 - 4.6 K/UL    ABS. MONOCYTES 1.2 0.1 - 1.3 K/UL    ABS. EOSINOPHILS 0.2 0.0 - 0.8 K/UL    ABS. BASOPHILS 0.0 0.0 - 0.2 K/UL    ABS. IMM. GRANS. 0.1 0.0 - 0.5 K/UL   METABOLIC PANEL, COMPREHENSIVE    Collection Time: 06/27/17  7:50 AM   Result Value Ref Range    Sodium 133 (L) 136 - 145 mmol/L    Potassium 4.9 3.5 - 5.1 mmol/L    Chloride 102 98 - 107 mmol/L    CO2 23 21 - 32 mmol/L    Anion gap 8 7 - 16 mmol/L    Glucose 173 (H) 65 - 100 mg/dL    BUN 51 (H) 8 - 23 MG/DL    Creatinine 0.81 0.8 - 1.5 MG/DL    GFR est AA >60 >60 ml/min/1.73m2    GFR est non-AA >60 >60 ml/min/1.73m2    Calcium 8.2 (L) 8.3 - 10.4 MG/DL    Bilirubin, total 0.2 0.2 - 1.1 MG/DL    ALT (SGPT) 183 (H) 12 - 65 U/L    AST (SGOT) 153 (H) 15 - 37 U/L    Alk.  phosphatase 181 (H) 50 - 136 U/L    Protein, total 8.1 6.3 - 8.2 g/dL    Albumin 2.6 (L) 3.2 - 4.6 g/dL    Globulin 5.5 (H) 2.3 - 3.5 g/dL    A-G Ratio 0.5 (L) 1.2 - 3.5     BNP    Collection Time: 06/27/17  7:50 AM   Result Value Ref Range     pg/mL   GLUCOSE, POC    Collection Time: 06/27/17  7:51 AM   Result Value Ref Range    Glucose (POC) 208 (H) 65 - 100 mg/dL   POC LACTIC ACID Collection Time: 06/27/17  9:00 AM   Result Value Ref Range    Lactic Acid (POC) 1.7 0.5 - 1.9 mmol/L     Imaging /Procedures /Studies     Assessment and Plan: Active Hospital Problems    Diagnosis Date Noted    Hypoglycemia 06/27/2017    Underweight 12/30/2014    DM (diabetes mellitus) type II uncontrolled, periph vascular disorder ([facility] [address] 11/14/2012       PLAN  ·  admit to inpatient  · Monitor glucose. Will continue lantus nightly dose and cover with ssi  · NS @ 100 cc/hr  · Pt has been afebrile with no cough or dyspnea.   Will hold on further abx and check ct chest as doubt pna    Code Status: full    Anticipated discharge: 3 days    Signed By: Dayna Milner MD     June 27, 2017

## 2017-06-27 NOTE — ED NOTES
TRANSFER - OUT REPORT:    Verbal report given to Mike Baron (name) on Jose C Paz  being transferred to  612(unit) for routine progression of care       Report consisted of patients Situation, Background, Assessment and   Recommendations(SBAR). Information from the following report(s) ED Summary was reviewed with the receiving nurse. Lines:   Peripheral IV 06/27/17 Left Antecubital (Active)        Opportunity for questions and clarification was provided.       Patient transported with:   O2 @ 2 liters

## 2017-06-28 PROBLEM — R74.8 ELEVATED LIVER ENZYMES: Status: ACTIVE | Noted: 2017-01-01

## 2017-06-28 PROBLEM — R91.8 PULMONARY INFILTRATES: Status: ACTIVE | Noted: 2017-01-01

## 2017-06-28 PROBLEM — R09.02 HYPOXEMIA: Status: ACTIVE | Noted: 2017-01-01

## 2017-06-28 PROBLEM — E87.5 HYPERKALEMIA: Status: ACTIVE | Noted: 2017-01-01

## 2017-06-28 PROBLEM — R79.89 ELEVATED BRAIN NATRIURETIC PEPTIDE (BNP) LEVEL: Status: ACTIVE | Noted: 2017-01-01

## 2017-06-28 PROBLEM — J90 PLEURAL EFFUSION, BILATERAL: Status: ACTIVE | Noted: 2017-01-01

## 2017-06-28 PROBLEM — G93.41 ACUTE METABOLIC ENCEPHALOPATHY: Status: ACTIVE | Noted: 2017-01-01

## 2017-06-28 PROBLEM — N17.9 AKI (ACUTE KIDNEY INJURY) (HCC): Status: ACTIVE | Noted: 2017-01-01

## 2017-06-28 PROBLEM — D64.9 ANEMIA: Status: ACTIVE | Noted: 2017-01-01

## 2017-06-28 NOTE — PROGRESS NOTES
TRANSFER - IN REPORT:    Verbal report received from Stacy Crowe on Ryan [place]  being received from ED for routine progression of care      Report consisted of patients Situation, Background, Assessment and   Recommendations(SBAR). Information from the following report(s) SBAR was reviewed with the receiving nurse. Opportunity for questions and clarification was provided. Assessment completed upon patients arrival to unit and care assumed.

## 2017-06-28 NOTE — WOUND CARE
Patient seen for perianal wound, it is at the anal sphincter. Pink and no present drainage. No sacral wound present. Family and patient state he has 2-3 loose BM's a day and needs cleaning out. Recommend dermal wound cleanser to irrigate after each BM. Unable to place dressing that will keep stool out. Probably won't be able to sit on sitz pan for irrigation. No local infection seen. Area 1cm diameter, did not explore with a q tip due to concern for injury to rectum. Discussed with primary nurse. Patient does not have diverting colostomy. If frequent infections occur, may need to have surgeon evaluate for further options.

## 2017-06-28 NOTE — PROGRESS NOTES
TRANSFER - IN REPORT:    Verbal report received from Rachael Pineda RN on Junaid Orn  being received from [facility] for routine progression of care      Report consisted of patients Situation, Background, Assessment and   Recommendations(SBAR). Information from the following report(s) SBAR, Kardex, ED Summary and Intake/Output was reviewed with the receiving nurse. Opportunity for questions and clarification was provided. Assessment completed upon patients arrival to unit and care assumed.

## 2017-06-28 NOTE — PROGRESS NOTES
Mr. Aubree Juan is feeling better. Afebrile. AAOx3. On low dose of Levophed - H/H:5.7/17. 1. Lactic acid:1.6. Echo with EF:30-35%, severe generalized hypokinesis. Appreciate pulmonary and cardiology help. Wife at bedside. Will transfused 2 PRBCs - check Us retroperitoneal. Hold heparin SQ.  Continue serial h/H and Chaim Hannah MD   06/28/17

## 2017-06-28 NOTE — PROCEDURES
The pt. Was placed in the supine position  The left neck was prepped and drapped    1% lidocaine was injected locally. The left IJ was imaged with ultrasound and was accessed using 18 gauge needle. An quad lumen catheter was placed Using Seldinger technique.   Good blood return

## 2017-06-28 NOTE — CONSULTS
[place] [facility] Consult                Date of  Admission: 6/27/2017  7:36 AM     Primary Care Physician: Dr Caitlin Hernandez  Primary Cardiologist: none  Referring Physician: Dr. Patty Almaraz Physician: Dr. Phylis Dakins    CC/Reason for consult: elevated troponin      Amelie Ross is a 58 y.o. male who presented from [facility] facility with low blood sugar. He was noted to have right sided infiltrate on chest xray but denied any fever/cough. He was admitted to the floor. Yesterday, a rapid response was called due to AMS and low BP. He was transferred to the ICU. His BP remains low. Hgb 7.0. Blood transfusion was ordered. His troponin is elevated at 1.15. He denies any chest pain. He believes he had a heart attack in 1996 but cannot recall if he had a LHC. He denies having any known blockages in his heart. He denies having any coronary stents even though this is listed in [address] He is not followed by cardiology and denies any recent chest pain. He states he feels poorly today and feel weak. He was recently admitted for a perirectal fistula. He has had bilateral AKAs due to PAD and DM.    Echo in 2012 showed normal LV EF, mild MR    Patient Active Problem List   Diagnosis Code    PAD (peripheral artery disease) ([facility]) I73.9    HTN (hypertension) I10    Tobacco abuse Z72.0    GERD (gastroesophageal reflux disease) K21.9    Thrombosis of Left External Iliac to above knee popliteal artery bypass graft I82.90    Atherosclerosis of native arteries of the extremities with rest pain I70.229    ETOH abuse F10.10    Allergy to intravenous contrast Z91.041    CAD (coronary artery disease) I25.10    Medical non-compliance Z91.19    Ischemic leg I99.8    Postoperative anemia due to acute blood loss D62    Gangrene from atherosclerosis, extremities ([facility]) I70.269    DM (diabetes mellitus) type II uncontrolled, periph vascular disorder ([facility]) E11.51, E11.65    Dyslipidemia E78.5    Carotid artery disease ([facility] I77.9    Status post above knee amputation (HCC) Z89.619    Other postprocedural status Z98.890    Sepsis ([facility] A41.9    Chronic hyponatremia E87.1    Hyponatremia E87.1    Acute renal failure (HCC) N17.9    Rectal bleed K62.5    Hypotension I95.9    S/P AKA (above knee amputation) bilateral ([facility] Z89.611, Z89.612    Dehydration E86.0    Diarrhea R19.7    Leukocytosis D72.829    UTI (urinary tract infection) N39.0    Acute encephalopathy G93.40    Dementia F03.90    Malnutrition (HCC) E46    Underweight R63.6    Perirectal fistula K60.4    Alcohol abuse, in remission F10.10    Hypokalemia E87.6    Nausea and vomiting R11.2    HCAP (healthcare-associated pneumonia) J18.9    Hypoglycemia E16.2    Elevated brain natriuretic peptide (BNP) level R79.89    EVANGELINA (acute kidney injury) ([facility] [address] N17.9    Hyperkalemia E87.5    Acute metabolic encephalopathy E64.10    Elevated liver enzymes R74.8    Anemia D64.9    Hypoxemia R09.02    Pleural effusion, bilateral J90    Pulmonary infiltrates R91.8       Past Medical History:   Diagnosis Date    Alcohol abuse, in remission 6/13/2017    Arthritis     CAD (coronary artery disease) 2008    MI 2006 with stent, stent 2008    Chronic hyponatremia 3/3/2014    Chronic pain     generalized    Dyslipidemia     GERD (gastroesophageal reflux disease)     Hypercholesterolemia     Hypertension     under control with med    Insulin dependent diabetes mellitus ([address] type 2 since 2005    insulin reliant.  bs: \"120'S\" BUT RUNS HIGH    Medical non-compliance 6/25/2012    MI (myocardial infarction) ([facility] [address]  2008    EF 55%    Other postprocedural status 1/2/2014 07/03/2013: S/P Right common femoral endarterectomy     PAD (peripheral artery disease) ([facility] [address] 10/5/2009    10/24/12 Graft thrombectomy through leg incision of left iliac  to popliteal bypass and left popliteal to posterior tibial bypass -Dr. Andrey Thakkar  10/15/09 Left external Iliac artery to above the knee popliteal artery bypass graft propaten PTFE, stenting L EIA- Dr. Ana Rudolph 10/5/09 Open amputation left 2nd, 3rd toes- Dr. Ligia Andrea 11/6/08 Left common femoral, superficial femoral and profunda femoris art    PUD (peptic ulcer disease)     PVD (peripheral vascular disease) ([facility] [address]     Seizures ([facility] [address]     x1 about 2010?  Sepsis ([facility] [address] 2/26/2014    Thrombosis of Left External Iliac to above knee popliteal artery bypass graft 9/23/2010    Tobacco abuse 10/5/2009      Past Surgical History:   Procedure Laterality Date    AMPUTATION TOE,I-P JT  2008    3 toes on left foot    BYPASS GRAFT OTHR,ILIOFEMORAL  2009    left     CARDIAC SURG PROCEDURE UNLIST  2009    2-3 stents    HX ABOVE KNEE AMPUTATION Right 2014    HX AMPUTATION Left 11/14/2012    Left leg above the knee amputation     HX FEMORAL BYPASS  8/29/12    OCCLUDED GRAFT    HX THROMBECTOMY  7/02/12    Graft thrombectomy, left common iliac artery angioplasty and stent, left popliteal artery balloon angioplasty    HX THROMBECTOMY  10/22/2012    Graft thrombectomy through leg incision of left iliac to popliteal bypass and left popliteal to posterior tibial bypass    VASCULAR SURGERY PROCEDURE UNLIST  07/03/2013    R common femoral endarterectomy, re-do groin dissection    VASCULAR SURGERY PROCEDURE UNLIST  06/13/2011    R common femoral endarterectomy, left common and external iliac artery balloon angioplasty,  selective catheterization of left external iliac artery from right common femoral artery    VASCULAR SURGERY PROCEDURE UNLIST  06/25/2012    L Femoral-popliteal graft thrombectomy     Allergies   Allergen Reactions    Contrast Agent [Iodine] Hives     IVP DYE/Contrast, pt medicated with prednisone 50mg x 3 and still had hives, itching.     DID WELL THE NEXT TIME WITHOUT ANY SYMPTOMS    Restoril [Temazepam] Hives      Family History   Problem Relation Age of Onset   Clayton Cardoza Stroke Mother     Hypertension Mother     Diabetes Other         Current Facility-Administered Medications   Medication Dose Route Frequency    famotidine (PF) (PEPCID) 20 mg in sodium chloride 0.9 % 10 mL injection  20 mg IntraVENous Q24H    vancomycin (VANCOCIN) 1,000 mg in 0.9% sodium chloride (MBP/ADV) 250 mL  1,000 mg IntraVENous ONCE    folic acid (FOLVITE) 1 mg, thiamine (B-1) 100 mg in 0.9% sodium chloride 50 mL ivpb   IntraVENous DAILY    norepinephrine (LEVOPHED) 4 mg in dextrose 5% 250 mL infusion  2-16 mcg/min IntraVENous TITRATE    calcium gluconate 1 g in 0.9% sodium chloride 100 mL IVPB  1 g IntraVENous ONCE    0.9% sodium chloride infusion 250 mL  250 mL IntraVENous PRN    furosemide (LASIX) injection 40 mg  40 mg IntraVENous ONCE    sodium chloride (NS) flush 5-10 mL  5-10 mL IntraVENous Q8H    sodium chloride (NS) flush 5-10 mL  5-10 mL IntraVENous PRN    aspirin delayed-release tablet 81 mg  81 mg Oral DAILY    atorvastatin (LIPITOR) tablet 80 mg  80 mg Oral DAILY    HYDROcodone-acetaminophen (NORCO) 5-325 mg per tablet 1 Tab  1 Tab Oral Q4H PRN    lacosamide (VIMPAT) tablet 100 mg  100 mg Oral BID    levETIRAcetam (KEPPRA) tablet 500 mg  500 mg Oral BID    senna-docusate (PERICOLACE) 8.6-50 mg per tablet 2 Tab  2 Tab Oral DAILY    sodium chloride (NS) flush 5-10 mL  5-10 mL IntraVENous Q8H    sodium chloride (NS) flush 5-10 mL  5-10 mL IntraVENous PRN    0.9% sodium chloride infusion  100 mL/hr IntraVENous CONTINUOUS    ondansetron (ZOFRAN) injection 4 mg  4 mg IntraVENous Q4H PRN    heparin (porcine) injection 5,000 Units  5,000 Units SubCUTAneous Q8H    insulin lispro (HUMALOG) injection   SubCUTAneous AC&HS    piperacillin-tazobactam (ZOSYN) 4.5 g in 0.9% sodium chloride (MBP/ADV) 100 mL  4.5 g IntraVENous Q8H       Review of Systems   Constitution: Positive for weakness and malaise/fatigue. Negative for chills, fever, weight gain and weight loss. HENT: Negative for ear pain, headaches, hearing loss, nosebleeds, sore throat and tinnitus. Eyes: Negative for blurred vision, vision loss in left eye and vision loss in right eye. Cardiovascular: Negative for chest pain, dyspnea on exertion, leg swelling, near-syncope, orthopnea, palpitations, paroxysmal nocturnal dyspnea and syncope. Respiratory: Negative for cough, hemoptysis, shortness of breath, sputum production and wheezing. Endocrine: Negative for cold intolerance, heat intolerance and polydipsia. Hematologic/Lymphatic: Does not bruise/bleed easily. Skin: Negative for color change and rash. Musculoskeletal: Negative for back pain, joint pain, joint swelling and myalgias. Gastrointestinal: Negative for abdominal pain, constipation, diarrhea, dysphagia, heartburn, hematemesis, melena, nausea and vomiting. Genitourinary: Negative for dysuria, frequency, hematuria and urgency. Neurological: Negative for difficulty with concentration, dizziness, light-headedness, numbness, paresthesias, seizures and vertigo. Psychiatric/Behavioral: Negative for altered mental status and depression.           Physical Exam  Vitals:    06/28/17 1109 06/28/17 1110 06/28/17 1117 06/28/17 1131   BP: (!) 81/51  (!) 88/54 (!) 88/52   Pulse: 84  85 86   Resp: 17 17 16   Temp:       SpO2: 96%  96% 94%   Weight:  50.3 kg (110 lb 14.3 oz)     Height:  3' 5\" (1.041 m)         Physical Exam:  General: Well Developed, Well Nourished, No Acute Distress but ill appearing  HEENT: pupils equal and round, no abnormalities noted  Neck: supple, no JVD  Heart: S1S2 with RRR  Lungs: decreased bilaterally  Abd: soft, nontender, nondistended, with good bowel sounds  Ext: s/p bilateral AKA  Skin: warm and dry  Psychiatric: Normal mood and affect  Neurologic: Alert and oriented X 3      Cardiographics    Telemetry: sinus rhythm   ECG: sinus rhythm, diffuse mild ST depression   Echocardiogram: pending    Labs:   Recent Labs      06/28/17   1030  06/28/17   0617  06/27/17   0750   NA  131*  130*  133*   K  5.4*  5.8*  4.9 BUN  60*  58*  51*   CREA  1.19  1.20  0.81   GLU  97  45*  173*   WBC   --   13.3*  13.5*   HGB   --   7.0*  7.6*   HCT   --   21.2*  22.6*   PLT   --   261  297   TROIQ  1.15*   --    --        Lab Results   Component Value Date/Time    Cholesterol, total 141 10/22/2012 06:00 AM    HDL Cholesterol 61 10/22/2012 06:00 AM    LDL, calculated 70.2 10/22/2012 06:00 AM    VLDL, calculated 9.8 10/22/2012 06:00 AM    Triglyceride 49 10/22/2012 06:00 AM    CHOL/HDL Ratio 2.3 10/22/2012 06:00 AM       Recent Labs      06/28/17   1030   TROIQ  1.15*       All Cardiac Markers in the last 24 hours:    Lab Results   Component Value Date/Time    TROIQ 1.15 () 06/28/2017 10:30 AM         Assessment/Plan:      Principal Problem:    Hypoglycemia (6/27/2017)    resolved    Active Problems:    Tobacco abuse (10/5/2009)    Cessation encouraged      ETOH abuse (6/10/2011)       CAD (coronary artery disease) (6/25/2012)    Pt believes he had MI in 1996, he denies having any stents? ?, PCI listed on PMH, no prior cath notes to review, he is not followed by cardiology, denies any chest pain      Medical non-compliance (6/25/2012)      DM (diabetes mellitus) type II uncontrolled, periph vascular disorder ([facility] [address] (11/14/2012)    Hospitalist following      Sepsis ([address] (2/26/2014)    BP remains low, on antibiotics, may need pressors      Acute renal failure ([address] (12/5/2014)    On IV fluids      Hypotension (12/5/2014)    BP low, may need central line/pressors      S/P AKA (above knee amputation) bilateral ([address] (12/5/2014)        Malnutrition ([address] (12/30/2014)      Elevated brain natriuretic peptide (BNP) level (6/28/2017)    Echo pending, does not appear volume overloaded, no orthopnea      EVANGELINA (acute kidney injury) ([address] (6/28/2017)    On IV fluids      Hyperkalemia (6/28/2017)    Improved, given albuterol, calcium, kayexalate yesterday     Acute metabolic encephalopathy (5/17/3117)       Elevated liver enzymes (6/28/2017)    New from previous admission, hold statin      Anemia (6/28/2017)    Blood transfusion ordered      Hypoxemia (6/28/2017)    On O2       Pleural effusion, bilateral (6/28/2017)  Lasix ordered, monitor response, follow renal function      Pulmonary infiltrates (6/28/2017)  As above, on antibiotics      Elevated troponin  Suspect demand ischemia in setting of sepsis/hypotension/EVANGELINA, denies any chest pain, ?history of CAD, awaiting echo results      Thank you very much for this referral. We appreciate the opportunity to participate in this patient's care. We will follow along with above stated plan. Christian Sierra PA-C  Consulting MD: Dr. Nikia Cook     6/28/2017     2:18 PM    I have personally seen and examined Bessy Wick with Rosalind ARREDONDO. I agree and confirm findings in history, physical exam, and assessment/plan as outlined above with following pertinent additions/exceptions:   Patient admitted by the hospitalist on June 27 with altered mental status, hypoglycemia and likely pneumonia based on right sided densities on his chest x-ray. He was started on antibiotic therapy. He has had hypotension with blood pressures as low as 70s and 80s. He is asymptomatic with adequate urine output. He has received intravenous fluid for his hypotension. He is on antibiotic therapy. His BNP is elevated and his echocardiogram shows EF 30-35%. He had a troponin level checked which was mildly elevated at 1.15. He is markedly anemic with hemoglobin of 7. In his history he has listed coronary artery disease although he cannot give me details. He is said that he had a heart attack in 1996. He denies any knowledge of cardiac stenting or bypass surgery. He also denies knowledge of iliac stenting or peripheral vascular disease which is well documented throughout his chart. He has bilateral amputations due to severe underlying peripheral vascular disease.   Chest CT also noted coronary artery calcifications consistent with coronary artery disease. He denies any chest pain consistent with angina. PE: CV: RRR  L: Coarse breath sounds. E: Bilateral AKA. ASS/PLAN: Patient with elevated troponin which is due to supply/demand mismatch in the setting of hypotension, possible sepsis and severe/profound anemia. I suspect he has significant underlying coronary artery disease given his peripheral vascular disease and coronary artery calcifications. However, would treat medically given his multiple comorbidities. He is not a candidate for invasive evaluation with his anemia, sepsis and lack of anginal symptoms. Would transfuse to keep his hemoglobin greater than 8. Follow troponin trend. Once his blood pressure stable, he would benefit from beta blocker and ACE-I therapy given his LV dysfunction.       Weston Harris MD

## 2017-06-28 NOTE — PROGRESS NOTES
Attempted to reach wife and son; there are 4 different numbers from pt's chart and they are either disconnected or person not receiving any calls.

## 2017-06-28 NOTE — PROGRESS NOTES
Patient arrived to room 3306. Patient bathed in john wipes and placed on the monitor. Patient is alert and oriented x4, able to verbalize needs. Respirations even and unlabored on 4L NC, breath sounds coarse. Patient denies SOB at this time. Patient denies pain. Dual skin assessment completed with Vandana Vyas RN. All skin intact, no pressure sores or skin breakdown noted. Allevyn applied to sacrum and patient turned for pressure offloading.

## 2017-06-28 NOTE — PROGRESS NOTES
Pharmacokinetic Consult to Pharmacist    Virginia Viviana is a 58 y.o. male being treated for Pneumonia with Vancomycin and Zosyn. Of note, the patient is s/p bilateral AKA and presented from a SNF where he was undergoing rehab. Height: 3' 5\" (104.1 cm) (bilateral AKA)  Weight: 50.3 kg (110 lb 14.3 oz)  Lab Results   Component Value Date/Time    BUN 60 06/28/2017 10:30 AM    Creatinine 1.19 06/28/2017 10:30 AM    WBC 13.3 06/28/2017 06:17 AM    Procalcitonin 0.3 12/05/2014 01:20 PM    Lactic acid 1.6 06/28/2017 10:30 AM    Lactic acid 1.4 12/27/2014 11:45 AM      Estimated Creatinine Clearance: 32 mL/min (based on Cr of 1.19). CULTURES:  6/27  Blood X 2  NGTD, pending    MRSA screen  Negative  6/28  Urine   Pending      Day 1 of vancomycin. Goal trough is 15-20. Vancomycin dose initiated at 1000 mg Q24H. Plan to draw vancomycin trough prior to the 3rd or 4th dose, depending on trend in renal function. Will continue to follow patient.       Thank you,  Lior Munoz, PharmD  Clinical Pharmacist  [phone]

## 2017-06-28 NOTE — PROGRESS NOTES
Critical Result Notification    Received and verbally repeated the following test results H&H 5.7/17.1 from [facility] on 6/28/17 at 1430. Dr. Erasmo Rubio was notified and provided a verbal readback of the results listed above on 6/28/17 at . Orders were received at this time. Additional comments: transfuse 1 more unit of PRBCs for a total of 2 and recheck hbg after. Also received order for US of retroperitoneum.     Alcides Suazo RN

## 2017-06-28 NOTE — PROGRESS NOTES
Hospitalist Progress Note    2017  Admit Date: 2017  7:36 AM   NAME: Ilah Schlatter   :  1955   DOS:              17  MRN:  [id number]   Attending: Paulino Wade MD  PCP:  Evens Marie MD  Treatment Team: Attending Provider: Flakita Francis MD; Utilization Review: Jo Ann Roach RN    Full Code     SUBJECTIVE:   As previously documented: \" Mr. Blu Ramirez is a 58 yom with a hx of dm, seizure, CAD severe pvd s/p bilateral aka who presents from snf with hypoglycemia reportedly in 35s. Given dextrose by EMS and glucose improved on admission to ER. Pt states he didn't eat much one day prior. Denies fevers/chills, Ha, nausea, vomiting, diarrhea, cp, sob. Pt found to have mild hypoxia in ER and cxr with rt sided densities. Started on vanco and zosyn in ER for suspected pneumonia. Recently admitted for perirectal fistula and d/c top SNF on . HbA1c:11.8 on 17\"       17   Mr. Ilah Schlatter had some low BS this AM:65, asymptomatic - improved with juice . Around 9AM he become unresponsive  and rapid response called. Low BP - received 1L NS bolus, although continued to have low BP. On 5L NC. Mental status improved after approx 10 min. K this AM:5.8. AST/ALT on :153/183, TWP:023. CXR, EKG, trop I, ABG, Kayexalate, Albuterol and IV calcium ordered. Denies SOB, CP or abdominal pain. Limited ROS reviewed  due to clinical status and negative except for positive in HPI. Allergies   Allergen Reactions    Contrast Agent [Iodine] Hives     IVP DYE/Contrast, pt medicated with prednisone 50mg x 3 and still had hives, itching.     DID WELL THE NEXT TIME WITHOUT ANY SYMPTOMS    Restoril [Temazepam] Hives     Current Facility-Administered Medications   Medication Dose Route Frequency Provider Last Rate Last Dose    albuterol (PROVENTIL VENTOLIN) nebulizer solution 2.5 mg  2.5 mg Nebulization NOW Paulino Wade MD        sodium chloride 0.9 % bolus infusion 1,000 mL  1,000 mL IntraVENous ONCE Eulalia Moralez MD 2,000 mL/hr at 06/28/17 1016 1,000 mL at 06/28/17 1016    famotidine (PF) (PEPCID) 20 mg in sodium chloride 0.9 % 10 mL injection  20 mg IntraVENous Q24H Eulalia Moralez MD        vancomycin (VANCOCIN) 1,000 mg in 0.9% sodium chloride (MBP/ADV) 250 mL  1,000 mg IntraVENous ONCE Fabian Redd MD        folic acid (FOLVITE) 1 mg, thiamine (B-1) 100 mg in 0.9% sodium chloride 50 mL ivpb   IntraVENous DAILY Eulalia Moralez MD        sodium polystyrene (KAYEXALATE) 15 gram/60 mL oral suspension 30 g  30 g Oral NOW Eulalia Moralez MD        norepinephrine (LEVOPHED) 4 mg in dextrose 5% 250 mL infusion  2-16 mcg/min IntraVENous TITRATE Eulalia Moralez MD        calcium gluconate 1 g in 0.9% sodium chloride 100 mL IVPB  1 g IntraVENous ONCE Eulalia Moralez MD        sodium chloride (NS) flush 5-10 mL  5-10 mL IntraVENous Q8H Efra Sim MD   10 mL at 06/27/17 1550    sodium chloride (NS) flush 5-10 mL  5-10 mL IntraVENous PRN Efra Sim MD        aspirin delayed-release tablet 81 mg  81 mg Oral DAILY Fabian Redd MD   81 mg at 06/28/17 0813    atorvastatin (LIPITOR) tablet 80 mg  80 mg Oral DAILY Fabian Redd MD   80 mg at 06/28/17 6713    HYDROcodone-acetaminophen (NORCO) 5-325 mg per tablet 1 Tab  1 Tab Oral Q4H PRN Fabian Redd MD   1 Tab at 06/27/17 2307    lacosamide (VIMPAT) tablet 100 mg  100 mg Oral BID Fabian Redd MD   100 mg at 06/28/17 6019    levETIRAcetam (KEPPRA) tablet 500 mg  500 mg Oral BID Fabian Redd MD   500 mg at 06/28/17 6097    senna-docusate (PERICOLACE) 8.6-50 mg per tablet 2 Tab  2 Tab Oral DAILY Fabian Redd MD   2 Tab at 06/28/17 8228    sodium chloride (NS) flush 5-10 mL  5-10 mL IntraVENous Q8H Fabian Redd MD   10 mL at 06/28/17 0502    sodium chloride (NS) flush 5-10 mL  5-10 mL IntraVENous PRN Fabian Redd MD        0.9% sodium chloride infusion 100 mL/hr IntraVENous CONTINUOUS Pratik Bain MD 75 mL/hr at 17 1232 75 mL/hr at 17 1232    ondansetron (ZOFRAN) injection 4 mg  4 mg IntraVENous Q4H PRN Shanon Morris MD        heparin (porcine) injection 5,000 Units  5,000 Units SubCUTAneous Q8H Shanon Morris MD   5,000 Units at 17 0501    insulin lispro (HUMALOG) injection   SubCUTAneous AC&HS Shanon Morris MD   Stopped at 17 1130    piperacillin-tazobactam (ZOSYN) 4.5 g in 0.9% sodium chloride (MBP/ADV) 100 mL  4.5 g IntraVENous Q8H Shiloh Smith MD 25 mL/hr at 17 0814 4.5 g at 17 0814         PHYSICAL EXAM     Visit Vitals    BP (!) 78/46    Pulse 83    Temp 97.9 °F (36.6 °C)    Resp 18    Ht 5' 8\" (1.727 m)    Wt 40.8 kg (90 lb)    SpO2 98%    BMI 13.68 kg/m2      Temp (24hrs), Av.1 °F (36.7 °C), Min:97.9 °F (36.6 °C), Max:98.6 °F (37 °C)    Oxygen Therapy  O2 Sat (%): 98 % (17 1014)  Pulse via Oximetry: 89 beats per minute (17 1229)  O2 Device: Nasal cannula (17)  O2 Flow Rate (L/min): 2 l/min (17)    Intake/Output Summary (Last 24 hours) at 17 1019  Last data filed at 17 0404   Gross per 24 hour   Intake             11.2 ml   Output              200 ml   Net           -188.8 ml        Physical Exam:  General:         Awake, lethargic  Afebrile. Looks older than stated age. HEENT:               NCAT. No obvious deformity. Nares normal. No drainage  Lungs:                  Decreased air entry b/l. Crackles at the base b/l. NC 5L  Cardiovascular:   RRR. No m/r/g. B/l BKA  Abdomen:       S/nt/nd. Bowel sounds normal. Wearing dipper. Skin:         No rashes or lesions. Not Jaundiced  Neurologic:    Awake. Lethargic. No gross focal deficit. Heme/Lymph/Immune: No petechiae, echymoses, overt signs of bleeding or lymphadenopathy.   Psychiatric:         confused             DIAGNOSTIC STUDIES      Data Review:   Recent Results (from the past 24 hour(s))   GLUCOSE, POC    Collection Time: 06/27/17 12:40 PM   Result Value Ref Range    Glucose (POC) 46 (L) 65 - 100 mg/dL   GLUCOSE, POC    Collection Time: 06/27/17  1:36 PM   Result Value Ref Range    Glucose (POC) 173 (H) 65 - 100 mg/dL   GLUCOSE, POC    Collection Time: 06/27/17  4:13 PM   Result Value Ref Range    Glucose (POC) 100 65 - 100 mg/dL   GLUCOSE, POC    Collection Time: 06/27/17  7:56 PM   Result Value Ref Range    Glucose (POC) 79 65 - 979 mg/dL   METABOLIC PANEL, BASIC    Collection Time: 06/28/17  6:17 AM   Result Value Ref Range    Sodium 130 (L) 136 - 145 mmol/L    Potassium 5.8 (H) 3.5 - 5.1 mmol/L    Chloride 98 98 - 107 mmol/L    CO2 19 (L) 21 - 32 mmol/L    Anion gap 13 7 - 16 mmol/L    Glucose 45 (L) 65 - 100 mg/dL    BUN 58 (H) 8 - 23 MG/DL    Creatinine 1.20 0.8 - 1.5 MG/DL    GFR est AA >60 >60 ml/min/1.73m2    GFR est non-AA >60 >60 ml/min/1.73m2    Calcium 7.9 (L) 8.3 - 10.4 MG/DL   CBC W/O DIFF    Collection Time: 06/28/17  6:17 AM   Result Value Ref Range    WBC 13.3 (H) 4.3 - 11.1 K/uL    RBC 2.35 (L) 4.23 - 5.67 M/uL    HGB 7.0 (L) 13.6 - 17.2 g/dL    HCT 21.2 (L) 41.1 - 50.3 %    MCV 81.7 79.6 - 97.8 FL    MCH 27.2 26.1 - 32.9 PG    MCHC 33.3 31.4 - 35.0 g/dL    RDW 21.4 (H) 11.9 - 14.6 %    PLATELET 575 513 - 438 K/uL    MPV 8.9 (L) 10.8 - 14.1 FL   BNP    Collection Time: 06/28/17  6:17 AM   Result Value Ref Range    BNP 1125 pg/mL   GLUCOSE, POC    Collection Time: 06/28/17  6:52 AM   Result Value Ref Range    Glucose (POC) 45 (L) 65 - 100 mg/dL   GLUCOSE, POC    Collection Time: 06/28/17  7:37 AM   Result Value Ref Range    Glucose (POC) 65 65 - 100 mg/dL   GLUCOSE, POC    Collection Time: 06/28/17  9:06 AM   Result Value Ref Range    Glucose (POC) 94 65 - 100 mg/dL   BLOOD GAS & LYTES, ARTERIAL    Collection Time: 06/28/17  9:10 AM   Result Value Ref Range    pH 7.35 7.35 - 7.45      PCO2 34 (L) 35.0 - 45.0 mmHg    PO2 76 75.0 - 100.0 mmHg    BICARBONATE 18 (L) 22.0 - 26.0 mmol/L    BASE DEFICIT 6.7 (H) 0 - 2 mmol/L    TOTAL HEMOGLOBIN 6.4 (LL) 11.7 - 15.0 GM/DL    O2 SAT 93 92.0 - 98.5 %    ARTERIAL O2 HGB 91.6 (L) 94.0 - 97.0 %    CARBOXYHEMOGLOBIN 0.8 0.5 - 1.5 %    METHEMOGLOBIN 0.7 0.0 - 1.5 %    DEOXYHEMOGLOBIN 7 (H) 0.0 - 5.0 %    Sodium 124.5 (L) 135 - 148 MMOL/L    POTASSIUM 5.36 (H) 3.5 - 5.3 MMOL/L    CHLORIDE 99 98 - 106      Calcium, ionized 1.11 1.0 - 1.3 mmol/L    SITE LR     ALLENS TEST POSITIVE      O2 FLOW 4.00 L/min    Respiratory comment: Dr Leonidas Gill at 6 28 2017 9 15 03 AM. Read back.     EKG, 12 LEAD, INITIAL    Collection Time: 06/28/17  9:25 AM   Result Value Ref Range    Ventricular Rate 86 BPM    Atrial Rate 86 BPM    P-R Interval 116 ms    QRS Duration 114 ms    Q-T Interval 370 ms    QTC Calculation (Bezet) 442 ms    Calculated P Axis 48 degrees    Calculated R Axis 12 degrees    Calculated T Axis -77 degrees    Diagnosis       Normal sinus rhythm  Septal infarct , age undetermined  ST & T wave abnormality, consider inferior ischemia  Abnormal ECG         All Micro Results     Procedure Component Value Units Date/Time    CULTURE, URINE [[id number]]     Order Status:  Sent Specimen:  Urine from Cath Urine     MRSA SCREEN - PCR (NASAL) [[id number]] Collected:  06/28/17 0655    Order Status:  Completed Specimen:  Nasal from Nares Updated:  06/28/17 0650    CULTURE, BLOOD [[id number]] Collected:  06/27/17 0938    Order Status:  Completed Specimen:  Whole Blood from Blood Updated:  06/28/17 0644     Special Requests: RIGHT FOREARM        Culture result: NO GROWTH AFTER 20 HOURS       CULTURE, BLOOD [[id number]] Collected:  06/27/17 0932    Order Status:  Completed Specimen:  Whole Blood from Blood Updated:  06/28/17 0644     Special Requests: RIGHT ANTECUBITAL        Culture result: NO GROWTH AFTER 20 HOURS             Imaging Chase Gonzalez /Studies:    CXR Results  (Last 48 hours)               06/27/17 0833  XR CHEST PA LAT Final result    Impression: IMPRESSION: Suspicious for right pneumonia, in the differential is asymmetric   pulmonary edema. Correlate clinically. Narrative:  2 View Chest X-Ray 6/27/2017 8:33 AM       INDICATION: Poorly controlled diabetes, cough       COMPARISON: 12/27/2014       FINDINGS: Upright AP and Lateral views are submitted. The cardiac and   mediastinal contours are normal. Lungs are adequately inflated and the left lung   clear. In the right lung there are clearly increased hazy and reticular lung   densities. There is slight pleural thickening laterally. No pneumothorax. Vascularity seems appropriate. CT Results  (Last 48 hours)               06/27/17 1048  CT CHEST WO CONT Final result    Impression:  IMPRESSION:       1. Bilateral pleural effusions and right lower lobe consolidation. 2. Calcified pancreatic head mass. This appearance may be secondary to chronic   pancreatitis. Correlate with medical history and if indicated with a pancreatic   protocol abdominal CT. Narrative:  CT CHEST WITHOUT CONTRAST 6/27/2017       HISTORY: Mild hypoxia in the ER. Right-sided densities on chest radiograph. TECHNIQUE: Noncontrast axial images were obtained through the chest.       COMPARISON: PA lateral chest x-ray from the same day       FINDINGS: Bilateral pleural effusions are present. Consolidation is present in   the right lower lobe. Paraseptal emphysematous changes are present in the upper   lobes. There is smooth thickening of the interstitial markings in the lung   apices. This finding may be present with interstitial edema. The central airways   are patent. There is a calcified granuloma in the periphery of the right upper   lobe. A few calcified right hilar lymph nodes are present, suggesting prior   granulomatous inflammation. Coronary artery atherosclerotic calcifications are   present.        Included images of the upper abdomen demonstrate normal-appearing adrenal glands and stones within the gallbladder. Multiple pancreatic head calcifications are   present. This finding may be present with chronic pancreatitis. This is similar   in appearance to CTA June 6, 2013. Labs and Studies from previous 24 hours have been personally reviewed by myself Umm Guzman    Diagnosis Date Noted    Elevated brain natriuretic peptide (BNP) level 06/28/2017    EVANGELINA (acute kidney injury) ([facility] [address] 06/28/2017    Hyperkalemia 06/28/2017    Acute metabolic encephalopathy 12/95/0872    Elevated liver enzymes 06/28/2017    Hypoglycemia 06/27/2017    Underweight 12/30/2014    Malnutrition ([address] 12/30/2014    S/P AKA (above knee amputation) bilateral ([facility] [address] 12/05/2014     Due to PVD, diabetes etc      Acute renal failure ([address] 12/05/2014    Sepsis ([facility] [address] 02/26/2014    DM (diabetes mellitus) type II uncontrolled, periph vascular disorder ([facility] [address] 11/14/2012    Medical non-compliance 06/25/2012    CAD (coronary artery disease) 06/25/2012    ETOH abuse 06/10/2011     12 pk of beer/ day for \"years\" until a month and have ago         Plan:  - AMS: unclear etiology - resolved <10 min. Check ammonia, TSH, RPR, Vitamin b12 - when more stable will get CT head vs MRI brain. Continue Vimpat/Keppra - check levels - hx of noncomplaince  - low BP - due to sepsis? Because of pneumonia? - on Zosyn/vanc . Culture pending. check UA and urin culture also. Recheck BNP and Echocardiogram to r/op cardiogenic shock. Not improving with IV fluids - will start on Levophed and will transfer to ICU  - hypoxia - started on Zosyn/Vanc for suspected pneumonia - will consider CTA chest if not improving  -hyperkalemia - on IV fluids, will give Albuterol/Calcium/Kayexalate  - start thiamine due to hx of alcohol abuse  - hold lantus due to low BS - monitor.  Continue ISS  - will consult intensivist - appreciate the help    DVT Prophylaxis: Heparin SQ  CODE Status: Full CODE  Plan of Care Discussed with: patient.  Care team. Dallas Chavez to reach wife and daughter by phone - unable    Home Work Mobile        Guadalupe Mancini [phone]   Aubrie Deiters Spouse [phone]       Medical Risk: high  Disposition: transfer to ICU    Santosh Roth MD  06/28/17

## 2017-06-28 NOTE — CONSULTS
PULMONARY/CCM CONSULT :  6/28/2017    Date of Admission:  6/27/2017    The patient's chart has been reviewed and the chart has been discussed with nursing staff. Subjective: This patient has been seen and evaluated at the request of Dr. Vern Mcintosh. Patient is a 58 y.o.  male presents with hypoglycemia from SNF. He has been in rehab facility after AKA; he has bilateral AKA amputations. There, his blood sugars were found to be in 30's on insulin. He has multiple co-morbidities: CAD, insulin dependent DM, chronic pancreatitis, recent perianal abscess, small perianal fistula, tobacco abuse, debility, and chronic pain. In the ER, he was hypoxic and zosyn/vancomycin were started. Chest Ct was obtained showing pleural effusions. BNP 1125. His hgb is 7.0. He is currently hypotensive with SBP 80's. He is asymptomatic. He denies cough, SOB, wheezing, hemoptysis or chest pain. He is hypotensive with hypoxemia with volume overload. We were asked to see him while in CCU. Past Medical History:   Diagnosis Date    Alcohol abuse, in remission 6/13/2017    Arthritis     CAD (coronary artery disease) 2008    MI 2006 with stent, stent 2008    Chronic hyponatremia 3/3/2014    Chronic pain     generalized    Dyslipidemia     GERD (gastroesophageal reflux disease)     Hypercholesterolemia     Hypertension     under control with med    Insulin dependent diabetes mellitus ([address] type 2 since 2005    insulin reliant.  bs: \"120'S\" BUT RUNS HIGH    Medical non-compliance 6/25/2012    MI (myocardial infarction) ([address]  2008    EF 55%    Other postprocedural status 1/2/2014 07/03/2013: S/P Right common femoral endarterectomy     PAD (peripheral artery disease) ([address] 10/5/2009    10/24/12 Graft thrombectomy through leg incision of left iliac  to popliteal bypass and left popliteal to posterior tibial bypass -Dr. Simon Arzola  10/15/09 Left external Iliac artery to above the knee popliteal artery bypass graft propaten PTFE, stenting L EIA- Dr. Flex Sal 10/5/09 Open amputation left 2nd, 3rd toes- Dr. Demond Vasquez 11/6/08 Left common femoral, superficial femoral and profunda femoris art    PUD (peptic ulcer disease)     PVD [facility]peripheral vascular disease) [facility]     Seizures ([facility]     x1 about 2010?     Sepsis ([facility] 2/26/2014    Thrombosis of Left External Iliac to above knee popliteal artery bypass graft 9/23/2010    Tobacco abuse 10/5/2009      Past Surgical History:   Procedure Laterality Date    AMPUTATION TOE,I-P JT  2008    3 toes on left foot    BYPASS GRAFT OTHR,ILIOFEMORAL  2009    left     CARDIAC SURG PROCEDURE UNLIST  2009    2-3 stents    HX ABOVE KNEE AMPUTATION Right 2014    HX AMPUTATION Left 11/14/2012    Left leg above the knee amputation     HX FEMORAL BYPASS  8/29/12    OCCLUDED GRAFT    HX THROMBECTOMY  7/02/12    Graft thrombectomy, left common iliac artery angioplasty and stent, left popliteal artery balloon angioplasty    HX THROMBECTOMY  10/22/2012    Graft thrombectomy through leg incision of left iliac to popliteal bypass and left popliteal to posterior tibial bypass    VASCULAR SURGERY PROCEDURE UNLIST  07/03/2013    R common femoral endarterectomy, re-do groin dissection    VASCULAR SURGERY PROCEDURE UNLIST  06/13/2011    R common femoral endarterectomy, left common and external iliac artery balloon angioplasty,  selective catheterization of left external iliac artery from right common femoral artery    VASCULAR SURGERY PROCEDURE UNLIST  06/25/2012    L Femoral-popliteal graft thrombectomy      Social History   Substance Use Topics    Smoking status: Current Every Day Smoker     Packs/day: 0.25     Years: 46.00    Smokeless tobacco: Never Used    Alcohol use 2.0 oz/week     4 Standard drinks or equivalent per week      Family History   Problem Relation Age of Onset    Stroke Mother     Hypertension Mother     Diabetes Other       Allergies   Allergen Reactions    Contrast Agent [Iodine] Hives     IVP DYE/Contrast, pt medicated with prednisone 50mg x 3 and still had hives, itching. DID WELL THE NEXT TIME WITHOUT ANY SYMPTOMS    Restoril [Temazepam] Hives      Prior to Admission Medications   Prescriptions Last Dose Informant Patient Reported? Taking? HYDROcodone-acetaminophen (NORCO) 5-325 mg per tablet   No No   Sig: Take 1 Tab by mouth every four (4) hours as needed. Max Daily Amount: 6 Tabs. aspirin delayed-release 81 mg tablet   Yes No   Sig: Take 81 mg by mouth daily. atorvastatin (LIPITOR) 80 mg tablet   Yes No   Sig: Take 80 mg by mouth daily. gabapentin (NEURONTIN) 300 mg capsule   No No   Sig: Take 2 Caps by mouth three (3) times daily. insulin glargine (LANTUS) 100 unit/mL injection   No No   Si Units by SubCUTAneous route every morning. insulin glargine (LANTUS) 100 unit/mL injection   No No   Si Units by SubCUTAneous route nightly. lacosamide (VIMPAT) 100 mg tab tablet   Yes No   Sig: Take 100 mg by mouth two (2) times a day. levETIRAcetam (KEPPRA) 500 mg tablet   No No   Sig: Take 1 Tab by mouth two (2) times a day. lisinopril (PRINIVIL, ZESTRIL) 2.5 mg tablet   No No   Sig: Take 1 Tab by mouth daily. polyethylene glycol (MIRALAX) 17 gram packet   No No   Sig: Take 1 Packet by mouth daily as needed. ranitidine (ZANTAC) 150 mg tablet   Yes No   Sig: Take 150 mg by mouth two (2) times a day. senna-docusate (PERICOLACE) 8.6-50 mg per tablet   No No   Sig: Take 2 Tabs by mouth daily.       Facility-Administered Medications: None       MEDS SCHEDULED:    Current Facility-Administered Medications   Medication Dose Route Frequency    famotidine (PF) (PEPCID) 20 mg in sodium chloride 0.9 % 10 mL injection  20 mg IntraVENous Q24H    vancomycin (VANCOCIN) 1,000 mg in 0.9% sodium chloride (MBP/ADV) 250 mL  1,000 mg IntraVENous ONCE    folic acid (FOLVITE) 1 mg, thiamine (B-1) 100 mg in 0.9% sodium chloride 50 mL ivpb   IntraVENous DAILY    norepinephrine (LEVOPHED) 4 mg in dextrose 5% 250 mL infusion  2-16 mcg/min IntraVENous TITRATE    calcium gluconate 1 g in 0.9% sodium chloride 100 mL IVPB  1 g IntraVENous ONCE    sodium chloride (NS) flush 5-10 mL  5-10 mL IntraVENous Q8H    sodium chloride (NS) flush 5-10 mL  5-10 mL IntraVENous PRN    aspirin delayed-release tablet 81 mg  81 mg Oral DAILY    atorvastatin (LIPITOR) tablet 80 mg  80 mg Oral DAILY    HYDROcodone-acetaminophen (NORCO) 5-325 mg per tablet 1 Tab  1 Tab Oral Q4H PRN    lacosamide (VIMPAT) tablet 100 mg  100 mg Oral BID    levETIRAcetam (KEPPRA) tablet 500 mg  500 mg Oral BID    senna-docusate (PERICOLACE) 8.6-50 mg per tablet 2 Tab  2 Tab Oral DAILY    sodium chloride (NS) flush 5-10 mL  5-10 mL IntraVENous Q8H    sodium chloride (NS) flush 5-10 mL  5-10 mL IntraVENous PRN    0.9% sodium chloride infusion  100 mL/hr IntraVENous CONTINUOUS    ondansetron (ZOFRAN) injection 4 mg  4 mg IntraVENous Q4H PRN    heparin (porcine) injection 5,000 Units  5,000 Units SubCUTAneous Q8H    insulin lispro (HUMALOG) injection   SubCUTAneous AC&HS    piperacillin-tazobactam (ZOSYN) 4.5 g in 0.9% sodium chloride (MBP/ADV) 100 mL  4.5 g IntraVENous Q8H         Review of Systems  Constitutional: positive for fatigue and malaise  Respiratory: positive for dyspnea on exertion or smoker  Cardiovascular: negative for chest pain, chest pressure/discomfort, palpitations, irregular heart beats  Gastrointestinal: negative for dyspepsia, reflux symptoms, nausea, vomiting, change in bowel habits, melena, diarrhea, constipation, abdominal pain and jaundice  Musculoskeletal:positive for debility with AKA    Objective:     Vitals:    06/28/17 1109 06/28/17 1110 06/28/17 1117 06/28/17 1131   BP: (!) 81/51  (!) 88/54 (!) 88/52   Pulse: 84  85 86   Resp: 17 17 16   Temp:       SpO2: 96%  96% 94%   Weight:  110 lb 14.3 oz (50.3 kg)     Height:  3' 5\" (1.041 m)          06/26 1901 - 06/28 0700  In: 11.2 [I.V.:11.2]  Out: 200 [Urine:200]      PHYSICAL EXAM     Physical Exam:   General:  Alert, cooperative, calm. Eyes:  Conjunctivae/corneas clear. Nose: Nares patent and moist. .    Mouth/Throat: Lips, mucosa, and tongue pink and intact. Neck: Supple, symmetrical.   Respiratory:   Scattered rhonchi to auscultation bilaterally on 3 lpm   Cardiovascular:  Regular rate and rhythm, S1, S2, no murmur, click, rub or gallop. Telemetry monitor:NSR   GI:   Abdomen soft, non-tender. Bowel sounds active X 4 Q. No masses,     Musculoskeletal: Extremities with bilateral AKA. Pulses: 2+ and symmetric all extremities. Skin: Skin color, texture, turgor normal.       Neurologic: Generalized weakness/debility. Alert and oriented.        Activity: limited with AKA  Nutrition: cardiac    CHEST CT:      CULTURES:ordered    LABS    Recent Labs      06/28/17   0617  06/27/17   0750   WBC  13.3*  13.5*   HGB  7.0*  7.6*   HCT  21.2*  22.6*   PLT  261  297     Recent Labs      06/28/17   1030  06/28/17   0617  06/27/17   0750   NA  131*  130*  133*   K  5.4*  5.8*  4.9   CL  100  98  102   GLU  97  45*  173*   CO2  19*  19*  23   BUN  60*  58*  51*   CREA  1.19  1.20  0.81   TROIQ  1.15*   --    --      Recent Labs      06/28/17   0910   PH  7.35   PCO2  34*   PO2  76   HCO3  18*       Assessment:     Hospital Problems  Date Reviewed: 6/13/2017          Codes Class Noted POA    Elevated brain natriuretic peptide (BNP) level ICD-10-CM: R79.89  ICD-9-CM: 790.99  6/28/2017 Yes        EVANGELINA (acute kidney injury) ([facility] ICD-10-CM: N17.9  ICD-9-CM: 584.9  6/28/2017 Yes        Hyperkalemia ICD-10-CM: E87.5  ICD-9-CM: 276.7  6/28/2017 No        Acute metabolic encephalopathy [facility]-06-FQ: G93.41  ICD-9-CM: 348.31  6/28/2017 Yes        Elevated liver enzymes ICD-10-CM: R74.8  ICD-9-CM: 790.5  6/28/2017 Yes        Anemia ICD-10-CM: D64.9  ICD-9-CM: 285.9  6/28/2017 Yes        Hypoxemia ICD-10-CM: R09.02  ICD-9-CM: 799.02  6/28/2017 Yes        Pleural effusion, bilateral ICD-10-CM: J90  ICD-9-CM: 511.9  6/28/2017 Yes        Pulmonary infiltrates ICD-10-CM: R91.8  ICD-9-CM: 793.19  6/28/2017 Yes        * (Principal)Hypoglycemia ICD-10-CM: E16.2  ICD-9-CM: 251.2  6/27/2017 Yes        Malnutrition ([facility] (Chronic) ICD-10-CM: E46  ICD-9-CM: 263.9  12/30/2014 Yes        Underweight (Chronic) ICD-10-CM: R63.6  ICD-9-CM: 783.22  12/30/2014 Yes        Acute renal failure ([facility] ICD-10-CM: N17.9  ICD-9-CM: 584.9  12/5/2014 Yes        Hypotension ICD-10-CM: I95.9  ICD-9-CM: 458.9  12/5/2014 Yes        S/P AKA (above knee amputation) bilateral ([facility] (Chronic) ICD-10-CM: F76.769, E73.110  ICD-9-CM: V49.76  12/5/2014 Yes    Overview Signed 12/5/2014  3:54 PM by Juhi Van     Due to PVD, diabetes etc             Sepsis ([facility] (Chronic) ICD-10-CM: A41.9  ICD-9-CM: 038.9, 995.91  2/26/2014 Yes        DM (diabetes mellitus) type II uncontrolled, periph vascular disorder (HCC) (Chronic) ICD-10-CM: E11.51, E11.65  ICD-9-CM: 250.72, 443.81  11/14/2012 Yes        CAD (coronary artery disease) (Chronic) ICD-10-CM: I25.10  ICD-9-CM: 414.00  6/25/2012 Yes        Medical non-compliance (Chronic) ICD-10-CM: Z91.19  ICD-9-CM: V15.81  6/25/2012 Yes        ETOH abuse (Chronic) ICD-10-CM: F10.10  ICD-9-CM: 305.00  6/10/2011 Yes    Overview Signed 6/10/2011  6:38 PM by ARNULFO Benz     12 pk of beer/ day for \"years\" until a month and have ago             Tobacco abuse (Chronic) ICD-10-CM: Z72.0  ICD-9-CM: 305.1  10/5/2009 Yes    Overview Signed 6/13/2017  5:28 PM by Nicole Antony NP     QUIT                    Plan:     --blood pressure is marginal with hgb 7.0. He is asymptomatic.  May benefit for transfusion and lasix  --check echocardiogram  -- may need central line and pressors  --smoking cessation  --continue abx: zosyn/vancomycin     [employer], NP-C    More than 50% of time documented was spent in face-to-face contact with the patient and in the care of the patient on the floor/unit where the patient is located. Lungs:  Basilar crackles  Heart:  RRR with no Murmur/Rubs/Gallops    Additional Comments:  Transfuse 1 unit of prbcs and give lasix-   If bp drops may need pressors    I have spoken with and examined the patient. I agree with the above assessment and plan as documented.     Melissa Simpson MD

## 2017-06-28 NOTE — PROGRESS NOTES
Pt's bp slowly dropping to 70/40's. Sat 98% on 4LNC. Tried to wean O2 to 3L; pt's Sat dropped immediately to 90%. Spoke with Dr. Derrek Triana; orders received to transfer pt to unit.

## 2017-06-28 NOTE — PROGRESS NOTES
TRANSFER - OUT REPORT:    Verbal report given to Bud(name) on Domo Montez  being transferred to [facility](unit) for urgent transfer       Report consisted of patients Situation, Background, Assessment and   Recommendations(SBAR). Information from the following report(s) SBAR was reviewed with the receiving nurse. Lines:   Peripheral IV 06/27/17 Left Antecubital (Active)   Site Assessment Clean, dry, & intact 6/28/2017  1:55 AM   Phlebitis Assessment 0 6/28/2017  1:55 AM   Infiltration Assessment 0 6/28/2017  1:55 AM   Dressing Status Clean, dry, & intact 6/28/2017  1:55 AM   Dressing Type Transparent;Tape 6/28/2017  1:55 AM   Hub Color/Line Status Infusing 6/28/2017  1:55 AM        Opportunity for questions and clarification was provided.       Patient transported with:   O2 @ 4 liters

## 2017-06-28 NOTE — PROGRESS NOTES
Spoke with Dr. Marta Bazan, relayed info regarding blood and lasix ordered by intensivist. Orders received to d/c IV fluids due to elevated BNP. Also received orders for craven catheter for strict I/Os.

## 2017-06-28 NOTE — PROGRESS NOTES
Critical Result Notification    Received and verbally repeated the following test results Troponin 1.15 from [facility], lab on 6/28/17 at 1130. Dr. Jerald Craft was notified and provided a verbal readback of the results listed above on 6/28/17 at [id number]. Orders were received at this time. Additional comments: Check troponins q6x2 and consult cardiology.      Gertrude Batista RN

## 2017-06-28 NOTE — H&P
Date of Surgery Update:  Nelsy Hill was seen and examined. History and physical has been reviewed. The patient has been examined.  There have been no significant clinical changes since the completion of the originally dated History and Physical.    Signed By: Esdras Alvares MD     June 28, 2017 1:32 PM

## 2017-06-28 NOTE — PROGRESS NOTES
Dr. Kyler Lizama notified of critical troponin of 1.15. Orders received for troponin Q6 x 2 more doses and to consult cardiolgoy.

## 2017-06-28 NOTE — PROGRESS NOTES
Patient experiencing cough after eating with congested cough sounds. Concern for aspirations as patient doesn't chew food entirely. Speech consult placed.

## 2017-06-29 NOTE — PROGRESS NOTES
Patient attempting to get out of bed and yelling out that there are \"bugs crawling on the walls. \"  Patient reoriented and repositioned in bed. PRN order Haldol 2 mg IM given for delirium. Dr. Rosa Maria Corcoran notified, orders received for Seroquel qhs prn.

## 2017-06-29 NOTE — PROGRESS NOTES
Critical Care Daily Progress Note: 6/29/2017    Selma Lopez   Admission Date: 6/27/2017         The patient's chart is reviewed and the patient is discussed with the staff. Pt is a 59 yo [place] American male with a B AKA, CAD, DM2, chronic pancreatitis, recent perianal abscess, small perianal fistula, tobacco abuse, debility, and chronic pain. Pt presented with hypoglycemia form SNF. He developed hypotension and was transferred to CCU. Central line was placed and he was started on pressors. Subjective:     Pt lying in bed on 1L O2-sat 95%. Pt denies complaints.      Current Facility-Administered Medications   Medication Dose Route Frequency    QUEtiapine (SEROquel) tablet 25-50 mg  25-50 mg Oral QHS PRN    dextrose 40% (GLUTOSE) oral gel 1 Tube  15 g Oral PRN    glucagon (GLUCAGEN) injection 1 mg  1 mg IntraMUSCular PRN    dextrose (D50W) injection syrg 12.5-25 g  25-50 mL IntraVENous PRN    dextrose 10% infusion  70 mL/hr IntraVENous CONTINUOUS    carvedilol (COREG) tablet 3.125 mg  3.125 mg Oral BID WITH MEALS    lisinopril (PRINIVIL, ZESTRIL) tablet 2.5 mg  2.5 mg Oral DAILY    famotidine (PF) (PEPCID) 20 mg in sodium chloride 0.9 % 10 mL injection  20 mg IntraVENous D04G    folic acid (FOLVITE) 1 mg, thiamine (B-1) 100 mg in 0.9% sodium chloride 50 mL ivpb   IntraVENous DAILY    norepinephrine (LEVOPHED) 4 mg in dextrose 5% 250 mL infusion  2-16 mcg/min IntraVENous TITRATE    0.9% sodium chloride infusion 250 mL  250 mL IntraVENous PRN    vancomycin (VANCOCIN) 1,000 mg in 0.9% sodium chloride (MBP/ADV) 250 mL  1,000 mg IntraVENous Q24H    haloperidol lactate (HALDOL) injection 2 mg  2 mg IntraMUSCular Q4H PRN    nicotine (NICODERM CQ) 14 mg/24 hr patch 1 Patch  1 Patch TransDERmal DAILY    sodium chloride (NS) flush 5-10 mL  5-10 mL IntraVENous Q8H    sodium chloride (NS) flush 5-10 mL  5-10 mL IntraVENous PRN    aspirin delayed-release tablet 81 mg  81 mg Oral DAILY    HYDROcodone-acetaminophen (NORCO) 5-325 mg per tablet 1 Tab  1 Tab Oral Q4H PRN    lacosamide (VIMPAT) tablet 100 mg  100 mg Oral BID    levETIRAcetam (KEPPRA) tablet 500 mg  500 mg Oral BID    senna-docusate (PERICOLACE) 8.6-50 mg per tablet 2 Tab  2 Tab Oral DAILY    sodium chloride (NS) flush 5-10 mL  5-10 mL IntraVENous PRN    ondansetron (ZOFRAN) injection 4 mg  4 mg IntraVENous Q4H PRN    insulin lispro (HUMALOG) injection   SubCUTAneous AC&HS    piperacillin-tazobactam (ZOSYN) 4.5 g in 0.9% sodium chloride (MBP/ADV) 100 mL  4.5 g IntraVENous Q8H       Review of Systems  Constitutional:  negative for fever, chills, sweats  Cardiovascular:  negative for chest pain, palpitations, syncope, edema  Gastrointestinal:  negative for dysphagia, reflux, vomiting, diarrhea, abdominal pain, or melena  Neurologic:  negative for focal weakness, numbness, headache      Objective:     Vitals:    06/29/17 0600 06/29/17 0615 06/29/17 0631 06/29/17 0937   BP: 106/66 104/59 107/62 99/57   Pulse: 85 80 80 92   Resp: 13 12 11    Temp:       SpO2: 99% 99% 98%    Weight:       Height:           Intake and Output:   06/27 1901 - 06/29 0700  In: 1916.2 [P.O.:380; I.V.:869.2]  Out: 3500 [Urine:3500]  06/29 0701 - 06/29 1900  In: 360 [P.O.:360]  Out: -     Physical Exam:          Constitutional:  the patient is well developed and in no acute distress, on 1L O2  EENMT:  Sclera clear, pupils equal, oral mucosa moist  Respiratory: coarse breath sounds  Cardiovascular:  RRR without M,G,R  Gastrointestinal: soft and non-tender; with positive bowel sounds. Musculoskeletal: warm without cyanosis.  B AKA  Skin:  no jaundice or rashes   Neurologic: no gross neuro deficits     Psychiatric:  alert and oriented x 3    LINES:  Quad lumen L IJ, peripheral R AC, R hand    DRIPS:   none    CXR:       LAB  Recent Labs      06/29/17   0954  06/29/17   0743  06/29/17   0640  06/29/17   0559  06/29/17   0511   GLUCPOC  141*  100  97  107*  107* Recent Labs      06/29/17   0951  06/29/17 0355 06/28/17 2330 06/28/17 1415 06/28/17 0617 06/27/17   0750   WBC   --   11.4*   --    --   13.3*  13.5*   HGB  8.9*  8.7*  8.9*  5.7*  7.0*  7.6*   HCT  26.4*  25.3*  25.5*  17.1*  21.2*  22.6*   PLT   --   223   --    --   261  297   INR   --    --    --   1.0   --    --      Recent Labs      06/29/17 0355 06/28/17 2330 06/28/17 2010 06/28/17 1840 06/28/17 1415 06/28/17   1030   06/27/17   0750   NA  136   --    --    --   133*  131*   < >  133*   K  3.7   --   4.3   --   5.4*  5.4*   < >  4.9   CL  103   --    --    --   102  100   < >  102   CO2  22   --    --    --   21  19*   < >  23   GLU  76   --    --    --   38*  97   < >  173*   BUN  45*   --    --    --   56*  60*   < >  51*   CREA  1.01   --    --    --   1.19  1.19   < >  0.81   CA  7.8*   --    --    --   7.7*  7.2*   < >  8.2*   TROIQ   --   1.01*   --   0.96*   --   1.15*   --    --    ALB   --    --    --    --   2.0*   --    --   2.6*   TBILI   --    --    --    --   0.3   --    --   0.2   ALT   --    --    --    --   110*   --    --   183*   SGOT   --    --    --    --   79*   --    --   153*    < > = values in this interval not displayed.      Recent Labs      06/28/17   0910   PH  7.35   PCO2  34*   PO2  76   HCO3  18*     Recent Labs      06/28/17   1030   LAC  1.6       Assessment:  (Medical Decision Making)     Hospital Problems  Date Reviewed: 6/29/2017          Codes Class Noted POA    Elevated brain natriuretic peptide (BNP) level-cardiology following ICD-10-CM: R79.89  ICD-9-CM: 790.99  6/28/2017 Yes        EVANGELINA (acute kidney injury) (HCC)-resolved ICD-10-CM: N17.9  ICD-9-CM: 584.9  6/28/2017 Yes        Hyperkalemia-resolved ICD-10-CM: E87.5  ICD-9-CM: 276.7  6/28/2017 No        Acute metabolic encephalopathy-resolved ICD-10-CM: G93.41  ICD-9-CM: 348.31  6/28/2017 Yes        Elevated liver enzymes-recheck in next few days ICD-10-CM: R74.8  ICD-9-CM: 790.5  6/28/2017 Yes        Anemia ICD-10-CM: D64.9  ICD-9-CM: 285.9  6/28/2017 Yes        Hypoxemia-wean O2 as tolerated ICD-10-CM: R09.02  ICD-9-CM: 799.02  6/28/2017 Yes        Pleural effusion, bilateral-repeat CXR tomorrow ICD-10-CM: J90  ICD-9-CM: 511.9  6/28/2017 Yes        Pulmonary infiltrates-continue abx ICD-10-CM: R91.8  ICD-9-CM: 793.19  6/28/2017 Yes        * (Principal)Hypoglycemia ICD-10-CM: E16.2  ICD-9-CM: 251.2  6/27/2017 Yes        Malnutrition ([facility] (Chronic) ICD-10-CM: E46  ICD-9-CM: 263.9  12/30/2014 Yes        Underweight (Chronic) ICD-10-CM: R63.6  ICD-9-CM: 783.22  12/30/2014 Yes        Acute renal failure ([facility] ICD-10-CM: N17.9  ICD-9-CM: 584.9  12/5/2014 Yes        Hypotension ICD-10-CM: I95.9  ICD-9-CM: 458.9  12/5/2014 Yes        S/P AKA (above knee amputation) bilateral ([facility] (Chronic) ICD-10-CM: Q61.348, F07.343  ICD-9-CM: V49.76  12/5/2014 Yes    Overview Signed 12/5/2014  3:54 PM by Dwain Loya     Due to PVD, diabetes etc             Sepsis ([facility] (Chronic) ICD-10-CM: A41.9  ICD-9-CM: 038.9, 995.91  2/26/2014 Yes        DM (diabetes mellitus) type II uncontrolled, periph vascular disorder (HCC) (Chronic) ICD-10-CM: E11.51, E11.65  ICD-9-CM: 250.72, 443.81  11/14/2012 Yes        CAD (coronary artery disease) (Chronic) ICD-10-CM: I25.10  ICD-9-CM: 414.00  6/25/2012 Yes        Medical non-compliance (Chronic)-chronic issue ICD-10-CM: Z91.19  ICD-9-CM: V15.81  6/25/2012 Yes        ETOH abuse (Chronic) ICD-10-CM: F10.10  ICD-9-CM: 305.00  6/10/2011 Yes    Overview Signed 6/10/2011  6:38 PM by ARNULFO Miller     12 pk of beer/ day for \"years\" until a month and have ago             Tobacco abuse (Chronic) ICD-10-CM: Z72.0  ICD-9-CM: 305.1  10/5/2009 Yes    Overview Signed 6/13/2017  5:28 PM by Alexis Betts NP     QUIT                    Plan:  (Medical Decision Making)     --wean O2 as tolerated, pt taken off O2, recheck in 15 mins  --continue Vanc/Zosyn  --repeat CXR in AM  --hopefully can transfer to the floor tomorrow    More than 50% of the time documented was spent in face-to-face contact with the patient and in the care of the patient on the floor/unit where the patient is located. ARNULFO Sapp    Lungs:  Some basilar crackles  Heart:  RRR with no Murmur/Rubs/Gallops    Additional Comments:  cxr tomorrow,lasix today    I have spoken with and examined the patient. I agree with the above assessment and plan as documented.     Ashok Coyne MD

## 2017-06-29 NOTE — PROGRESS NOTES
Bedside and Verbal shift change report given to Aurelia (oncoming nurse) by Erwin Clemons (offgoing nurse). Report included the following information SBAR, Kardex, ED Summary, Procedure Summary, Intake/Output, MAR, Recent Results, Med Rec Status and Cardiac Rhythm SR 86. Levophed drip restarted for SBP 74. Patient remains alert and oriented presently.

## 2017-06-29 NOTE — PROGRESS NOTES
Dr De León Sa here to see patient POC discussed. Wife and other family members at bedside. Patient eating arielle crackers and peanut butter presently. Alert and oriented.

## 2017-06-29 NOTE — PROGRESS NOTES
Speech language pathology: bedside swallow note: Initial Assessment and Discharge    NAME/AGE/GENDER: Kaela Zurita is a 58 y.o. male  DATE: 6/29/2017  PRIMARY DIAGNOSIS: HCAP (healthcare-associated pneumonia)       ICD-10: Treatment Diagnosis: R13.12 Oropharyngeal dysphagia  INTERDISCIPLINARY COLLABORATION: Registered Nurse, Physician  PRECAUTIONS/ALLERGIES: Contrast agent [iodine] and Restoril [temazepam] ASSESSMENT:Based on the objective data described below, Mr. Sherri Ambrosio presents with swallow function that is within normal limits. Patient with edentulous on top with missing lower dentition. He reports consuming regular diet at home without difficulty. Patient presented with thin liquid via straw, puree, and solid consistency. Appropriate mastication with swallow initiation on all trials. Single swallow palpated, likely indicating adequate pharyngeal clearing. No oral residue note. No overt signs or symptoms of airway compromise. Vocal quality remained clear throughout evaluation. Swallow function is within normal limits and skilled speech therapy services are not warranted at this time. Reviewed results and recommendations with patient, wife, and physician at conclusion of evaluation. ?????? ? ? This section established at most recent assessment??????????  PROBLEM LIST (Impairments causing functional limitations):  1. Dysphagia  REHABILITATION POTENTIAL FOR STATED GOALS: Excellent  PLAN OF CARE:   Patient will benefit from skilled intervention to address the following impairments. RECOMMENDATIONS AND PLANNED INTERVENTIONS (Benefits and precautions of therapy have been discussed with the patient.):  · PO:  Regular  · Liquids:  regular thin  MEDICATIONS:  · With liquid  COMPENSATORY STRATEGIES/MODIFICATIONS INCLUDING:  · None  RECOMMENDED DIET MODIFICATIONS DISCUSSED WITH:  · Hospitalist  · Family  · Patient  FREQUENCY/DURATION:Dysphagia treatment is not warranted at this time.  RECOMMENDED REHABILITATION/EQUIPMENT: (at time of discharge pending progress):   None. SUBJECTIVE:   \"There's nothing wrong with me. I am just fine\"  History of Present Injury/Illness: Mr. Delmis Celis  has a past medical history of Alcohol abuse, in remission (6/13/2017); Arthritis; CAD (coronary artery disease) (2008); Chronic hyponatremia (3/3/2014); Chronic pain; Dyslipidemia; GERD (gastroesophageal reflux disease); Hypercholesterolemia; Hypertension; Insulin dependent diabetes mellitus ([facility] [address] (type 2 since 2005); Medical non-compliance (6/25/2012); MI (myocardial infarction) ([address] ( 2008); Other postprocedural status (1/2/2014); PAD (peripheral artery disease) ([facility] [address] (10/5/2009); PUD (peptic ulcer disease); PVD (peripheral vascular disease) ([facility] [address] Seizures ([facility] [address] Sepsis ([facility] [address] (2/26/2014); 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 Lizbeth [place] He also  has a past surgical history that includes amputation toe,i-p jt (2008); cardiac surg procedure unlist (2009); thrombectomy (7/02/12); femoral bypass (8/29/12); amputation (Left, 11/14/2012); thrombectomy (10/22/2012); bypass graft othr,iliofemoral (2009); vascular surgery procedure unlist (07/03/2013); vascular surgery procedure unlist (06/13/2011); vascular surgery procedure unlist (06/25/2012); and above knee amputation (Right, 2014). Present Symptoms: Patient denies dysphagia symptoms   Pain Intensity 1: 0  Pain Location 1: Back  Pain Orientation 1: Posterior  Pain Intervention(s) 1: Medication (see MAR)  Current Medications:   No current facility-administered medications on file prior to encounter. Current Outpatient Prescriptions on File Prior to Encounter   Medication Sig Dispense Refill    insulin glargine (LANTUS) 100 unit/mL injection 20 Units by SubCUTAneous route every morning. 1 Vial 0    insulin glargine (LANTUS) 100 unit/mL injection 35 Units by SubCUTAneous route nightly.  1 Vial 0    HYDROcodone-acetaminophen (NORCO) 5-325 mg per tablet Take 1 Tab by mouth every four (4) hours as needed. Max Daily Amount: 6 Tabs. 15 Tab 0    polyethylene glycol (MIRALAX) 17 gram packet Take 1 Packet by mouth daily as needed. 30 Each 0    senna-docusate (PERICOLACE) 8.6-50 mg per tablet Take 2 Tabs by mouth daily. 30 Tab 0    lacosamide (VIMPAT) 100 mg tab tablet Take 100 mg by mouth two (2) times a day.  levETIRAcetam (KEPPRA) 500 mg tablet Take 1 Tab by mouth two (2) times a day. 60 Tab 3    atorvastatin (LIPITOR) 80 mg tablet Take 80 mg by mouth daily.  aspirin delayed-release 81 mg tablet Take 81 mg by mouth daily.  gabapentin (NEURONTIN) 300 mg capsule Take 2 Caps by mouth three (3) times daily. 180 Cap 3    lisinopril (PRINIVIL, ZESTRIL) 2.5 mg tablet Take 1 Tab by mouth daily. 30 Tab 5    ranitidine (ZANTAC) 150 mg tablet Take 150 mg by mouth two (2) times a day. Current Dietary Status:  Cardiac regular/thin      Social History/Home Situation:    Home Environment: Skilled nursing facility  One/Two Story Residence: One story  Living Alone: No  Support Systems: Family member(s)  Patient Expects to be Discharged to[de-identified] Skilled nursing facility  Current DME Used/Available at Home: None  OBJECTIVE:   Respiratory Status:        CXR Results:1. Bilateral pleural effusions and right lower lobe consolidation.     2. Calcified pancreatic head mass. This appearance may be secondary to chronic  pancreatitis.  Correlate with medical history and if indicated with a pancreatic  protocol abdominal CT  Oral Motor Structure/Speech:  Oral-Motor Structure/Motor Speech  Labial: No impairment  Dentition: Limited  Oral Hygiene: Adequate  Lingual: No impairment  Velum: No impairment    Cognitive and Communication Status:  Neurologic State: Alert  Orientation Level: Oriented to time;Oriented to place;Oriented to person  Cognition: Follows commands  Perception: Appears intact          BEDSIDE SWALLOW EVALUATION  Oral Assessment:  Oral Assessment  Labial: No impairment  Dentition: Limited  Oral Hygiene: Adequate  Lingual: No impairment  Velum: No impairment  P.O. Trials:  Patient Position: Upright in Bed    The patient was given tsp-small bite amounts of the following:   Consistency Presented: Thin liquid;Puree; Solid  How Presented: Self-fed/presented;Straw;Spoon;Cup/sip    ORAL PHASE:  Bolus Acceptance: No impairment  Bolus Formation/Control: No impairment  Propulsion: No impairment     Oral Residue: None    PHARYNGEAL PHASE:  Initiation of Swallow: No impairment  Laryngeal Elevation: Functional  Aspiration Signs/Symptoms: None  Vocal Quality: No impairment           Pharyngeal Phase Characteristics: No impairment, issues, or problems     OTHER OBSERVATIONS:  Rate/bite size: WNL   Endurance: WNL   Comments: Tool Used: Dysphagia Outcome and Severity Scale (BLANKA)    Score Comments   Normal Diet  [x] 7 With no strategies or extra time needed   Functional Swallow  [] 6 May have mild oral or pharyngeal delay       Mild Dysphagia    [] 5 Which may require one diet consistency restricted (those who demonstrate penetration which is entirely cleared on MBS would be included)   Mild-Moderate Dysphagia  [] 4 With 1-2 diet consistencies restricted       Moderate Dysphagia  [] 3 With 2 or more diet consistencies restricted       Moderately Severe Dysphagia  [] 2 With partial PO strategies (trials with ST only)       Severe Dysphagia  [] 1 With inability to tolerate any PO safely          Score:  Initial: 7 Most Recent: X (Date: -- )   Interpretation of Tool: The Dysphagia Outcome and Severity Scale (BLANKA) is a simple, easy-to-use, 7-point scale developed to systematically rate the functional severity of dysphagia based on objective assessment and make recommendations for diet level, independence level, and type of nutrition. Score 7 6 5 4 3 2 1   Modifier CH CI CJ CK CL CM CN   ?  Swallowing:     - CURRENT STATUS: CH - 0% impaired, limited or restricted    - GOAL STATUS:  CH - 0% impaired, limited or restricted    - D/C STATUS:  [address] - 0% impaired, limited or restricted  [place] / [place] / Product Type: Medicare /     TREATMENT:    (In addition to Assessment/Re-Assessment sessions the following treatments were rendered)  Assessment/Reassessment only, no treatment provided today  MODALITIES:                                                                    ORAL MOTOR  EXERCISES:                                                                                                                                                                      LARYNGEAL / PHARYNGEAL EXERCISES:                                                                                                                                     __________________________________________________________________________________________________  Safety:   After treatment position/precautions:  · Family at bedside  · Upright in Bed  Treatment Assessment:  Swallow function is within normal limits.   Total Treatment Duration:  Time In: 0903  Time Out: 0915    RUFUS Love, CCC-SLP, CBIS

## 2017-06-29 NOTE — PROGRESS NOTES
bgl 130 when rechecked, Dr. Kimberly Mackenzie notified of dropping glucose. Patient responds to voice but immediately falls back asleep. Due to national shortage of D50, orders to place on slow drip of D10 until responsive enough for oral intake.

## 2017-06-29 NOTE — PROGRESS NOTES
Patient has pulled out IV in right arm Seems a little confused presently States they were put in at [facility]. Reoriented patient that he was at [facility] and that the IV's were put in here. Asked patient to please call the nurse before pulling out anything else.

## 2017-06-29 NOTE — PROGRESS NOTES
Bedside report received from [facility]. Patient A&Ox4 with intermittent confusion, appropriate and follows commands at this time. Vital signs stable with marginal BP, off levophed at the moment. 2nd PRBC infusing without difficulty. Safety measures in place, will continue to monitor.     Late entry due to hands on patient care

## 2017-06-29 NOTE — PROGRESS NOTES
Hospitalist Progress Note    2017  Admit Date: 2017  7:36 AM   NAME: Jose C Paz   :  1955   DOS:              17  MRN:  [id number]   Attending: Tish Bishop MD  PCP:  Zulma Caraballo MD  Treatment Team: Attending Provider: Bam Oneal MD; Utilization Review: Main Olson RN; Consulting Provider: Benedict Hoffman MD; Consulting Provider: Krystle Angel MD; Speech Language Pathologist: Racquel Kirkland, KRYSTLE    Full Code     SUBJECTIVE:   As previously documented: \" Mr. Denny Leone is a 58 yom with a hx of dm, seizure, CAD severe pvd s/p bilateral aka from snf with hypoglycemia reportedly in 35s. Given dextrose by EMS and glucose improved on admission to ER. Started on vanco and zosyn in ER for suspected LLL pneumonia. Recently admitted for perirectal fistula and d/c top SNF on . HbA1c:11.8 on 17. Transferred to ICU for unresponsiveness, Low BP and started on fluid bolus pressors. Hgb dropped to 5.7 from 8.9 given 1u prbc with lasix. TTE showed EF 35% with hypokinesis. Seen by Cards and pulm. \"       17   Feels a bit better, no SOB, alert and oriented. Hgb now 8.7. Limited ROS reviewed  due to clinical status and negative except for positive in HPI. Allergies   Allergen Reactions    Contrast Agent [Iodine] Hives     IVP DYE/Contrast, pt medicated with prednisone 50mg x 3 and still had hives, itching.     DID WELL THE NEXT TIME WITHOUT ANY SYMPTOMS    Restoril [Temazepam] Hives     Current Facility-Administered Medications   Medication Dose Route Frequency Provider Last Rate Last Dose    QUEtiapine (SEROquel) tablet 25-50 mg  25-50 mg Oral QHS PRN Kerrie Matute MD        dextrose 40% (GLUTOSE) oral gel 1 Tube  15 g Oral PRN Kerrie Matute MD        glucagon [facility]) injection 1 mg  1 mg IntraMUSCular PRN Kerrie Matute MD        dextrose (D50W) injection syrg 12.5-25 g  25-50 mL IntraVENous PRN Kerrie Matute MD   25 g at 17 9454    dextrose 10% infusion  70 mL/hr IntraVENous CONTINUOUS Dalton Aguilar MD   Stopped at 06/29/17 0630    carvedilol (COREG) tablet 3.125 mg  3.125 mg Oral BID WITH MEALS Milla De Leon MD        lisinopril (PRINIVIL, ZESTRIL) tablet 2.5 mg  2.5 mg Oral DAILY Milla De Leon MD        famotidine (PF) (PEPCID) 20 mg in sodium chloride 0.9 % 10 mL injection  20 mg IntraVENous Q24H Ronald Farris MD   20 mg at 46/54/23 1007    folic acid (FOLVITE) 1 mg, thiamine (B-1) 100 mg in 0.9% sodium chloride 50 mL ivpb   IntraVENous DAILY Ronald Farris MD        norepinephrine (LEVOPHED) 4 mg in dextrose 5% 250 mL infusion  2-16 mcg/min IntraVENous TITRATE Ronald Farris MD   Stopped at 06/29/17 0630    0.9% sodium chloride infusion 250 mL  250 mL IntraVENous PRN Vania Claudio NP        vancomycin (VANCOCIN) 1,000 mg in 0.9% sodium chloride (MBP/ADV) 250 mL  1,000 mg IntraVENous Q24H Ronald Farris MD        haloperidol lactate (HALDOL) injection 2 mg  2 mg IntraMUSCular Q4H PRN Ronald Farris MD   2 mg at 06/29/17 0118    nicotine (NICODERM CQ) 14 mg/24 hr patch 1 Patch  1 Patch TransDERmal DAILY Ronald Farris MD        sodium chloride (NS) flush 5-10 mL  5-10 mL IntraVENous Q8H Zheng Rojas MD   10 mL at 06/29/17 0529    sodium chloride (NS) flush 5-10 mL  5-10 mL IntraVENous PRN Zheng Rojas MD        aspirin delayed-release tablet 81 mg  81 mg Oral DAILY Steven Garcia MD   81 mg at 06/28/17 0813    HYDROcodone-acetaminophen (NORCO) 5-325 mg per tablet 1 Tab  1 Tab Oral Q4H PRN Steven Garcia MD   1 Tab at 06/29/17 0054    lacosamide (VIMPAT) tablet 100 mg  100 mg Oral BID Steven Garcia MD   100 mg at 06/28/17 2130    levETIRAcetam (KEPPRA) tablet 500 mg  500 mg Oral BID Steven Garcia MD   500 mg at 06/28/17 1747    senna-docusate (PERICOLACE) 8.6-50 mg per tablet 2 Tab  2 Tab Oral DAILY Steven Garcia MD   2 Tab at 06/28/17 9357    sodium chloride (NS) flush 5-10 mL  5-10 mL IntraVENous PRN Dayna Milner MD        ondansetron [facility]) injection 4 mg  4 mg IntraVENous Q4H PRN Dayna Milner MD        insulin lispro (HUMALOG) injection   SubCUTAneous AC&HS Dayna Milner MD   Stopped at 17 1630    piperacillin-tazobactam (ZOSYN) 4.5 g in 0.9% sodium chloride (MBP/ADV) 100 mL  4.5 g IntraVENous Q8H Marvin Tello MD 25 mL/hr at 17 0113 4.5 g at 17 0113         PHYSICAL EXAM     Visit Vitals    /62    Pulse 80    Temp 98.1 °F (36.7 °C)    Resp 11    Ht 3' 5\" (1.041 m)  Comment: bilateral AKA    Wt 48.3 kg (106 lb 7.7 oz)    SpO2 98%    BMI 44.54 kg/m2      Temp (24hrs), Av.7 °F (37.1 °C), Min:98.1 °F (36.7 °C), Max:99 °F (37.2 °C)    Oxygen Therapy  O2 Sat (%): 98 % (17)  Pulse via Oximetry: 81 beats per minute (17)  O2 Device: Nasal cannula (17)  O2 Flow Rate (L/min): 4 l/min (17)    Intake/Output Summary (Last 24 hours) at 17 0843  Last data filed at 17 8749   Gross per 24 hour   Intake             1905 ml   Output             3300 ml   Net            -1395 ml        Physical Exam:  General:         Awake,  Afebrile. Looks older than stated age. HEENT:               NCAT. No obvious deformity. Lungs:                  Decreased air entry b/l. Crackles at the base b/l. NC 5L  Cardiovascular:   RRR. No m/r/g. B/l BKA  Abdomen:       S/nt/nd. Bowel sounds normal. Wearing dipper. Skin:         No rashes or lesions. Not Jaundiced  Neurologic:    Awake. No gross focal deficit.   PSych:  Anxious                DIAGNOSTIC STUDIES      Data Review:   Recent Results (from the past 24 hour(s))   GLUCOSE, POC    Collection Time: 17  9:06 AM   Result Value Ref Range    Glucose (POC) 94 65 - 100 mg/dL   BLOOD GAS & LYTES, ARTERIAL    Collection Time: 17  9:10 AM   Result Value Ref Range    pH 7.35 7.35 - 7.45      PCO2 34 (L) 35.0 - 45.0 mmHg PO2 76 75.0 - 100.0 mmHg    BICARBONATE 18 (L) 22.0 - 26.0 mmol/L    BASE DEFICIT 6.7 (H) 0 - 2 mmol/L    TOTAL HEMOGLOBIN 6.4 (LL) 11.7 - 15.0 GM/DL    O2 SAT 93 92.0 - 98.5 %    ARTERIAL O2 HGB 91.6 (L) 94.0 - 97.0 %    CARBOXYHEMOGLOBIN 0.8 0.5 - 1.5 %    METHEMOGLOBIN 0.7 0.0 - 1.5 %    DEOXYHEMOGLOBIN 7 (H) 0.0 - 5.0 %    Sodium 124.5 (L) 135 - 148 MMOL/L    POTASSIUM 5.36 (H) 3.5 - 5.3 MMOL/L    CHLORIDE 99 98 - 106      Calcium, ionized 1.11 1.0 - 1.3 mmol/L    SITE LR     ALLENS TEST POSITIVE      O2 FLOW 4.00 L/min    Respiratory comment: Dr Rajendra Kamara at 6 28 2017 9 15 03 AM. Read back.     EKG, 12 LEAD, INITIAL    Collection Time: 06/28/17  9:25 AM   Result Value Ref Range    Ventricular Rate 86 BPM    Atrial Rate 86 BPM    P-R Interval 116 ms    QRS Duration 114 ms    Q-T Interval 370 ms    QTC Calculation (Bezet) 442 ms    Calculated P Axis 48 degrees    Calculated R Axis 12 degrees    Calculated T Axis -77 degrees    Diagnosis       Normal sinus rhythm  Septal infarct , age undetermined  ST & T wave abnormality, consider inferolateral ischemia  Abnormal ECG    Confirmed by ST NIKUNJ MOREJON MD (), VENKAT GENTILE ([id number]) on 6/28/2017 0:93:45 PM     METABOLIC PANEL, BASIC    Collection Time: 06/28/17 10:30 AM   Result Value Ref Range    Sodium 131 (L) 136 - 145 mmol/L    Potassium 5.4 (H) 3.5 - 5.1 mmol/L    Chloride 100 98 - 107 mmol/L    CO2 19 (L) 21 - 32 mmol/L    Anion gap 12 7 - 16 mmol/L    Glucose 97 65 - 100 mg/dL    BUN 60 (H) 8 - 23 MG/DL    Creatinine 1.19 0.8 - 1.5 MG/DL    GFR est AA >60 >60 ml/min/1.73m2    GFR est non-AA >60 >60 ml/min/1.73m2    Calcium 7.2 (L) 8.3 - 10.4 MG/DL   LACTIC ACID, PLASMA    Collection Time: 06/28/17 10:30 AM   Result Value Ref Range    Lactic acid 1.6 0.4 - 2.0 MMOL/L   TROPONIN I    Collection Time: 06/28/17 10:30 AM   Result Value Ref Range    Troponin-I, Qt. 1.15 (HH) 0.02 - 0.05 NG/ML   LIPASE    Collection Time: 06/28/17 10:30 AM   Result Value Ref Range    Lipase 164 73 - 393 U/L   TSH 3RD GENERATION    Collection Time: 06/28/17 10:30 AM   Result Value Ref Range    TSH 1.340 0.358 - 3.740 uIU/mL   AMMONIA    Collection Time: 06/28/17 10:30 AM   Result Value Ref Range    Ammonia 22 11 - 32 UMOL/L   GLUCOSE, POC    Collection Time: 06/28/17 10:46 AM   Result Value Ref Range    Glucose (POC) 119 (H) 65 - 100 mg/dL   GLUCOSE, POC    Collection Time: 06/28/17 11:35 AM   Result Value Ref Range    Glucose (POC) 163 (H) 65 - 100 mg/dL   DRUG SCREEN, URINE    Collection Time: 06/28/17  1:45 PM   Result Value Ref Range    PCP(PHENCYCLIDINE) NEGATIVE       BENZODIAZEPINE NEGATIVE       COCAINE NEGATIVE       AMPHETAMINE NEGATIVE       METHADONE NEGATIVE       THC (TH-CANNABINOL) NEGATIVE       OPIATES POSITIVE      BARBITURATES NEGATIVE      URINALYSIS W/ RFLX MICROSCOPIC    Collection Time: 06/28/17  1:45 PM   Result Value Ref Range    Color YELLOW      Appearance TURBID      Specific gravity 1.018 1.001 - 1.023      pH (UA) 5.0 5.0 - 9.0      Protein 30 (A) NEG mg/dL    Glucose NEGATIVE  mg/dL    Ketone NEGATIVE  NEG mg/dL    Bilirubin NEGATIVE  NEG      Blood SMALL (A) NEG      Urobilinogen 0.2 0.2 - 1.0 EU/dL    Nitrites NEGATIVE  NEG      Leukocyte Esterase LARGE (A) NEG     URINE MICROSCOPIC    Collection Time: 06/28/17  1:45 PM   Result Value Ref Range    WBC 10-20 0 /hpf    RBC 3-5 0 /hpf    Epithelial cells 0 0 /hpf    Bacteria 1+ (H) 0 /hpf    Casts 0 0 /lpf    Crystals, urine 0 0 /LPF    Mucus 0 0 /lpf    Yeast MODERATE     TYPE & CROSSMATCH    Collection Time: 06/28/17  1:50 PM   Result Value Ref Range    Crossmatch Expiration 07/01/2017     ABO/Rh(D) A POSITIVE     Antibody screen NEG     Unit number [id number]     Blood component type  LR AS5     Unit division 00     Status of unit TRANSFUSED     Crossmatch result Compatible     Unit number [id number]     Blood component type RC LR AS5     Unit division 00     Status of unit TRANSFUSED     Crossmatch result Compatible    HGB & HCT    Collection Time: 06/28/17  2:15 PM   Result Value Ref Range    HGB 5.7 (LL) 13.6 - 17.2 g/dL    HCT 17.1 (LL) 41.1 - 57.7 %   METABOLIC PANEL, COMPREHENSIVE    Collection Time: 06/28/17  2:15 PM   Result Value Ref Range    Sodium 133 (L) 136 - 145 mmol/L    Potassium 5.4 (H) 3.5 - 5.1 mmol/L    Chloride 102 98 - 107 mmol/L    CO2 21 21 - 32 mmol/L    Anion gap 10 7 - 16 mmol/L    Glucose 38 (LL) 65 - 100 mg/dL    BUN 56 (H) 8 - 23 MG/DL    Creatinine 1.19 0.8 - 1.5 MG/DL    GFR est AA >60 >60 ml/min/1.73m2    GFR est non-AA >60 >60 ml/min/1.73m2    Calcium 7.7 (L) 8.3 - 10.4 MG/DL    Bilirubin, total 0.3 0.2 - 1.1 MG/DL    ALT (SGPT) 110 (H) 12 - 65 U/L    AST (SGOT) 79 (H) 15 - 37 U/L    Alk.  phosphatase 149 (H) 50 - 136 U/L    Protein, total 6.9 6.3 - 8.2 g/dL    Albumin 2.0 (L) 3.2 - 4.6 g/dL    Globulin 4.9 (H) 2.3 - 3.5 g/dL    A-G Ratio 0.4 (L) 1.2 - 3.5     PROTHROMBIN TIME + INR    Collection Time: 06/28/17  2:15 PM   Result Value Ref Range    Prothrombin time 10.9 9.6 - 12.0 sec    INR 1.0 0.9 - 1.2     GLUCOSE, POC    Collection Time: 06/28/17  3:07 PM   Result Value Ref Range    Glucose (POC) 98 65 - 100 mg/dL   CULTURE, URINE    Collection Time: 06/28/17  3:34 PM   Result Value Ref Range    Special Requests: NO SPECIAL REQUESTS      Culture result: NO GROWTH 1 DAY     GLUCOSE, POC    Collection Time: 06/28/17  3:40 PM   Result Value Ref Range    Glucose (POC) 79 65 - 100 mg/dL   GLUCOSE, POC    Collection Time: 06/28/17  5:45 PM   Result Value Ref Range    Glucose (POC) 128 (H) 65 - 100 mg/dL   TROPONIN I    Collection Time: 06/28/17  6:40 PM   Result Value Ref Range    Troponin-I, Qt. 0.96 (HH) 0.02 - 0.05 NG/ML   POTASSIUM    Collection Time: 06/28/17  8:10 PM   Result Value Ref Range    Potassium 4.3 3.5 - 5.1 mmol/L   GLUCOSE, POC    Collection Time: 06/28/17  9:32 PM   Result Value Ref Range    Glucose (POC) 137 (H) 65 - 100 mg/dL   TROPONIN I    Collection Time: 06/28/17 11:30 PM   Result Value Ref Range    Troponin-I, Qt. 1.01 (HH) 0.02 - 0.05 NG/ML   HGB & HCT    Collection Time: 06/28/17 11:30 PM   Result Value Ref Range    HGB 8.9 (L) 13.6 - 17.2 g/dL    HCT 25.5 (L) 41.1 - 50.3 %   GLUCOSE, POC    Collection Time: 06/29/17  2:41 AM   Result Value Ref Range    Glucose (POC) 19 (LL) 65 - 100 mg/dL   GLUCOSE, POC    Collection Time: 06/29/17  2:56 AM   Result Value Ref Range    Glucose (POC) 184 (H) 65 - 100 mg/dL   GLUCOSE, POC    Collection Time: 06/29/17  2:57 AM   Result Value Ref Range    Glucose (POC) 185 (H) 65 - 100 mg/dL   GLUCOSE, POC    Collection Time: 06/29/17  3:11 AM   Result Value Ref Range    Glucose (POC) 130 (H) 65 - 100 mg/dL   GLUCOSE, POC    Collection Time: 06/29/17  3:38 AM   Result Value Ref Range    Glucose (POC) 109 (H) 65 - 100 mg/dL   CBC WITH AUTOMATED DIFF    Collection Time: 06/29/17  3:55 AM   Result Value Ref Range    WBC 11.4 (H) 4.3 - 11.1 K/uL    RBC 3.07 (L) 4.23 - 5.67 M/uL    HGB 8.7 (L) 13.6 - 17.2 g/dL    HCT 25.3 (L) 41.1 - 50.3 %    MCV 82.4 79.6 - 97.8 FL    MCH 28.3 26.1 - 32.9 PG    MCHC 34.4 31.4 - 35.0 g/dL    RDW 18.8 (H) 11.9 - 14.6 %    PLATELET 021 524 - 962 K/uL    MPV 8.9 (L) 10.8 - 14.1 FL    DF AUTOMATED      NEUTROPHILS 75 43 - 78 %    LYMPHOCYTES 13 13 - 44 %    MONOCYTES 11 4.0 - 12.0 %    EOSINOPHILS 1 0.5 - 7.8 %    BASOPHILS 0 0.0 - 2.0 %    IMMATURE GRANULOCYTES 0.4 0.0 - 5.0 %    ABS. NEUTROPHILS 8.4 (H) 1.7 - 8.2 K/UL    ABS. LYMPHOCYTES 1.5 0.5 - 4.6 K/UL    ABS. MONOCYTES 1.2 0.1 - 1.3 K/UL    ABS. EOSINOPHILS 0.1 0.0 - 0.8 K/UL    ABS. BASOPHILS 0.0 0.0 - 0.2 K/UL    ABS. IMM.  GRANS. 0.1 0.0 - 0.5 K/UL   METABOLIC PANEL, BASIC    Collection Time: 06/29/17  3:55 AM   Result Value Ref Range    Sodium 136 136 - 145 mmol/L    Potassium 3.7 3.5 - 5.1 mmol/L    Chloride 103 98 - 107 mmol/L    CO2 22 21 - 32 mmol/L    Anion gap 11 7 - 16 mmol/L    Glucose 76 65 - 100 mg/dL    BUN 45 (H) 8 - 23 MG/DL    Creatinine 1.01 0.8 - 1.5 MG/DL    GFR est AA >60 >60 ml/min/1.73m2    GFR est non-AA >60 >60 ml/min/1.73m2    Calcium 7.8 (L) 8.3 - 10.4 MG/DL   GLUCOSE, POC    Collection Time: 06/29/17  4:06 AM   Result Value Ref Range    Glucose (POC) 95 65 - 100 mg/dL   GLUCOSE, POC    Collection Time: 06/29/17  4:39 AM   Result Value Ref Range    Glucose (POC) 101 (H) 65 - 100 mg/dL   GLUCOSE, POC    Collection Time: 06/29/17  5:11 AM   Result Value Ref Range    Glucose (POC) 107 (H) 65 - 100 mg/dL   GLUCOSE, POC    Collection Time: 06/29/17  5:59 AM   Result Value Ref Range    Glucose (POC) 107 (H) 65 - 100 mg/dL   GLUCOSE, POC    Collection Time: 06/29/17  6:40 AM   Result Value Ref Range    Glucose (POC) 97 65 - 100 mg/dL   GLUCOSE, POC    Collection Time: 06/29/17  7:43 AM   Result Value Ref Range    Glucose (POC) 100 65 - 100 mg/dL       All Micro Results     Procedure Component Value Units Date/Time    CULTURE, URINE [[id number]] Collected:  06/28/17 1534    Order Status:  Completed Specimen:  Urine from Cath Urine Updated:  06/29/17 0838     Special Requests: NO SPECIAL REQUESTS        Culture result: NO GROWTH 1 DAY       CULTURE, BLOOD [[id number]] Collected:  06/27/17 0938    Order Status:  Completed Specimen:  Whole Blood from Blood Updated:  06/29/17 0719     Special Requests: RIGHT FOREARM        Culture result: NO GROWTH 2 DAYS       CULTURE, BLOOD [[id number]] Collected:  06/27/17 0932    Order Status:  Completed Specimen:  Whole Blood from Blood Updated:  06/29/17 0719     Special Requests: RIGHT ANTECUBITAL        Culture result: NO GROWTH 2 DAYS       MRSA SCREEN - PCR (NASAL) [[id number]] Collected:  06/28/17 0655    Order Status:  Completed Specimen:  Nasal from Nasal Swab Updated:  06/28/17 1032     Special Requests: NO SPECIAL REQUESTS        Culture result:         MRSA target DNA is not detected (presumptive not colonized with MRSA)          Imaging [facility] /Studies:    CXR Results  (Last 48 hours) 06/27/17 0833  XR CHEST PA LAT Final result    Impression:  IMPRESSION: Suspicious for right pneumonia, in the differential is asymmetric   pulmonary edema. Correlate clinically. Narrative:  2 View Chest X-Ray 6/27/2017 8:33 AM       INDICATION: Poorly controlled diabetes, cough       COMPARISON: 12/27/2014       FINDINGS: Upright AP and Lateral views are submitted. The cardiac and   mediastinal contours are normal. Lungs are adequately inflated and the left lung   clear. In the right lung there are clearly increased hazy and reticular lung   densities. There is slight pleural thickening laterally. No pneumothorax. Vascularity seems appropriate. CT Results  (Last 48 hours)               06/27/17 1048  CT CHEST WO CONT Final result    Impression:  IMPRESSION:       1. Bilateral pleural effusions and right lower lobe consolidation. 2. Calcified pancreatic head mass. This appearance may be secondary to chronic   pancreatitis. Correlate with medical history and if indicated with a pancreatic   protocol abdominal CT. Narrative:  CT CHEST WITHOUT CONTRAST 6/27/2017       HISTORY: Mild hypoxia in the ER. Right-sided densities on chest radiograph. TECHNIQUE: Noncontrast axial images were obtained through the chest.       COMPARISON: PA lateral chest x-ray from the same day       FINDINGS: Bilateral pleural effusions are present. Consolidation is present in   the right lower lobe. Paraseptal emphysematous changes are present in the upper   lobes. There is smooth thickening of the interstitial markings in the lung   apices. This finding may be present with interstitial edema. The central airways   are patent. There is a calcified granuloma in the periphery of the right upper   lobe. A few calcified right hilar lymph nodes are present, suggesting prior   granulomatous inflammation. Coronary artery atherosclerotic calcifications are   present.        Included images of the upper abdomen demonstrate normal-appearing adrenal glands   and stones within the gallbladder. Multiple pancreatic head calcifications are   present. This finding may be present with chronic pancreatitis. This is similar   in appearance to CTA June 6, 2013. Labs and Studies from previous 24 hours have been personally reviewed by myself        TTE:    SUMMARY:    -  Left ventricle: The ventricle was mildly dilated. Systolic function was  severely reduced. Ejection fraction was estimated in the range of 30 % to 35   %. There was severe diffuse hypokinesis. -  Right ventricle: The ventricle was mildly dilated. Systolic function was  reduced. -  Left atrium: The atrium was mildly dilated. -  Right atrium: The atrium was mildly dilated. -  Inferior vena cava, hepatic veins: The inferior vena cava was dilated. The  respirophasic change in diameter was less than 50%. -  Mitral valve: There was moderate regurgitation. -  Tricuspid valve: There was mild to moderate regurgitation.     ASSESSMENT      Active Hospital Problems    Diagnosis Date Noted    Elevated brain natriuretic peptide (BNP) level 06/28/2017    EVANGELINA (acute kidney injury) ([address] 06/28/2017    Hyperkalemia 06/28/2017    Acute metabolic encephalopathy 44/81/5293    Elevated liver enzymes 06/28/2017    Anemia 06/28/2017    Hypoxemia 06/28/2017    Pleural effusion, bilateral 06/28/2017    Pulmonary infiltrates 06/28/2017    Hypoglycemia 06/27/2017    Underweight 12/30/2014    Malnutrition ([address] 12/30/2014    S/P AKA (above knee amputation) bilateral ([address] 12/05/2014     Due to PVD, diabetes etc      Acute renal failure ([address] 12/05/2014    Hypotension 12/05/2014    Sepsis ([address] 02/26/2014    DM (diabetes mellitus) type II uncontrolled, periph vascular disorder ([address] 11/14/2012    Medical non-compliance 06/25/2012    CAD (coronary artery disease) 06/25/2012    ETOH abuse 06/10/2011     12 pk of beer/ day for \"years\" until a month and have ago      Tobacco abuse 10/05/2009     QUIT          Plan:    - Acute Metabolic Encephalopathy: unclear etiology - resolved <10 min. Could be 2/2 Hypoxia, Low BP, cardiac event, ? seizure. Monitor on tele. Consider CT head vs MRI brain. Continue Vimpat/Keppra - check levels - hx of noncomplaince  - Shock likely Septic (PNA/UTI) vs Cardiogenic vs Hypovolemia/Anemia - on Zosyn/vanc, follow Cultures, Off pressors, monitor closely, WBCs trending down. - Acute Hypoxic Resp failure: LLL PNA, on Zosyn/Vanc.  - Severe Systolic CHF with EF 48%: Appreciate cards, started on low dose BB/ACEIs. Ischemic workup per cards. - Anemia: ? GIBleed vs dilutional. Get Stool occult blood, monitor HH, US abd unremrkable. - Hyperkalemia - Resolved  - On thiamine due to hx of alcohol abuse  - Hypoglycemia:  holding lantus due to low BS - monitor. Continue ISS      DVT Prophylaxis: SCDs  CODE Status: Full CODE  Plan of Care Discussed with: patient. Care team. Family contacts below:    Home Work [address] [phone]  [facility] [address] Spouse [phone]       Medical Risk: high due to Shock, Sepsis, Severe CHF. Disposition: Monitor in ICU, transfer to tele floor when stable.     Christina Sommer MD  06/29/17

## 2017-06-29 NOTE — PROGRESS NOTES
Patient diaphoretic and unresponsive.  bgl 19 when checked, 1 amp D50 given. Patient drowsy, responds to voice. bgl 185 when rechecked 15 minutes later. Will continue to monitor.

## 2017-06-29 NOTE — PROGRESS NOTES
6/29/2017 6:59 AM    Admit Date: 6/27/2017    Admit Diagnosis: HCAP (healthcare-associated pneumonia)      Subjective:    Patient seems to be doing a little better. Echo reviewed. Severe lv dysfunction.  Has underlying systolic heart failure    Objective:      Visit Vitals    /62    Pulse 80    Temp 98.1 °F (36.7 °C)    Resp 11    Ht 3' 5\" (1.041 m)  Comment: bilateral AKA    Wt 48.3 kg (106 lb 7.7 oz)    SpO2 98%    BMI 44.54 kg/m2       ROS:  General ROS: negative for - chills  Hematological and Lymphatic ROS: negative for - blood clots or jaundice  Respiratory ROS: no cough, shortness of breath, or wheezing  Cardiovascular ROS: no chest pain or dyspnea on exertion  Gastrointestinal ROS: no abdominal pain, change in bowel habits, or black or bloody stools  Neurological ROS: no TIA or stroke symptoms    Physical Exam:    Physical Examination: General appearance - alert, well appearing, and in no distress  Mental status - alert, oriented to person, place, and time  Eyes - pupils equal and reactive, extraocular eye movements intact  Neck/lymph - supple, no significant adenopathy  Chest/CV - clear to auscultation, no wheezes, rales or rhonchi, symmetric air entry  Heart - normal rate, regular rhythm, normal S1, S2, no murmurs, rubs, clicks or gallops  Abdomen/GI - soft, nontender, nondistended, no masses or organomegaly  Musculoskeletal - no joint tenderness, deformity or swelling  Extremities -bilat amputations  Skin - normal coloration and turgor, no rashes, no suspicious skin lesions noted    Current Facility-Administered Medications   Medication Dose Route Frequency    QUEtiapine (SEROquel) tablet 25-50 mg  25-50 mg Oral QHS PRN    dextrose 40% (GLUTOSE) oral gel 1 Tube  15 g Oral PRN    glucagon (GLUCAGEN) injection 1 mg  1 mg IntraMUSCular PRN    dextrose (D50W) injection syrg 12.5-25 g  25-50 mL IntraVENous PRN    dextrose 10% infusion  70 mL/hr IntraVENous CONTINUOUS    famotidine (PF) (PEPCID) 20 mg in sodium chloride 0.9 % 10 mL injection  20 mg IntraVENous O43L    folic acid (FOLVITE) 1 mg, thiamine (B-1) 100 mg in 0.9% sodium chloride 50 mL ivpb   IntraVENous DAILY    norepinephrine (LEVOPHED) 4 mg in dextrose 5% 250 mL infusion  2-16 mcg/min IntraVENous TITRATE    0.9% sodium chloride infusion 250 mL  250 mL IntraVENous PRN    vancomycin (VANCOCIN) 1,000 mg in 0.9% sodium chloride (MBP/ADV) 250 mL  1,000 mg IntraVENous Q24H    haloperidol lactate (HALDOL) injection 2 mg  2 mg IntraMUSCular Q4H PRN    nicotine (NICODERM CQ) 14 mg/24 hr patch 1 Patch  1 Patch TransDERmal DAILY    sodium chloride (NS) flush 5-10 mL  5-10 mL IntraVENous Q8H    sodium chloride (NS) flush 5-10 mL  5-10 mL IntraVENous PRN    aspirin delayed-release tablet 81 mg  81 mg Oral DAILY    HYDROcodone-acetaminophen (NORCO) 5-325 mg per tablet 1 Tab  1 Tab Oral Q4H PRN    lacosamide (VIMPAT) tablet 100 mg  100 mg Oral BID    levETIRAcetam (KEPPRA) tablet 500 mg  500 mg Oral BID    senna-docusate (PERICOLACE) 8.6-50 mg per tablet 2 Tab  2 Tab Oral DAILY    sodium chloride (NS) flush 5-10 mL  5-10 mL IntraVENous PRN    ondansetron (ZOFRAN) injection 4 mg  4 mg IntraVENous Q4H PRN    insulin lispro (HUMALOG) injection   SubCUTAneous AC&HS    piperacillin-tazobactam (ZOSYN) 4.5 g in 0.9% sodium chloride (MBP/ADV) 100 mL  4.5 g IntraVENous Q8H       Data Review:   @LABRCNT(Na,K,BUN,CREA,WBC,HGB,HCT,PLT,INR,TRP,TCHOL*,Triglyceride*,LDL*,LDLCPOC HDL*,HDL])@    TELEMETRY: nsr    Assessment/Plan:     Principal Problem:    Hypoglycemia (6/27/2017)    Active Problems:    Tobacco abuse (10/5/2009)      Overview: QUIT       ETOH abuse (6/10/2011)      Overview: 12 pk of beer/ day for \"years\" until a month and have ago      CAD (coronary artery disease) (6/25/2012) The current medical regimen is effective;  continue present plan and medications.         Medical non-compliance (6/25/2012)      DM (diabetes mellitus) type II uncontrolled, periph vascular disorder ([facility] (11/14/2012)The current medical regimen is effective;  continue present plan and medications. Sepsis ([facility] (2/26/2014)      Acute renal failure ([facility] (12/5/2014)      Hypotension (12/5/2014)      S/P AKA (above knee amputation) bilateral ([facility] (12/5/2014)      Overview: Due to PVD, diabetes etc      Malnutrition ([facility] (12/30/2014)      Underweight (12/30/2014)      Elevated brain natriuretic peptide (BNP) level (1/91/6531) has systolic heart failure.  Add meds      EVANGELINA (acute kidney injury) ([facility] (6/28/2017) monitor labs      Hyperkalemia (6/28/2017)      Acute metabolic encephalopathy (2/20/7211)      Elevated liver enzymes (6/28/2017)      Anemia (6/28/2017)      Hypoxemia (6/28/2017)      Pleural effusion, bilateral (6/28/2017)      Pulmonary infiltrates (6/28/2017)          Shay Bose MD Per Dr. Sutherland, cardiologist, pt to stop Plavix 5 days prior to surgery (last dose 1/13/17), pt verbalized good understanding.  Pt saw cardiologist for clearance, report requested.

## 2017-06-30 NOTE — PROGRESS NOTES
6/30/2017 6:59 AM    Admit Date: 6/27/2017    Admit Diagnosis: HCAP (healthcare-associated pneumonia)      Subjective:    Patient seems to be doing a little better. Echo reviewed. Severe lv dysfunction.  Has underlying systolic heart failure    Objective:      Visit Vitals    /71    Pulse 87    Temp 98.7 °F (37.1 °C)    Resp 13    Ht 3' 5\" (1.041 m)  Comment: bilateral AKA    Wt 48.3 kg (106 lb 7.7 oz)    SpO2 98%    BMI 44.54 kg/m2       ROS:  General ROS: negative for - chills  Hematological and Lymphatic ROS: negative for - blood clots or jaundice  Respiratory ROS: no cough, shortness of breath, or wheezing  Cardiovascular ROS: no chest pain or dyspnea on exertion  Gastrointestinal ROS: no abdominal pain, change in bowel habits, or black or bloody stools  Neurological ROS: no TIA or stroke symptoms    Physical Exam:    Physical Examination: General appearance - alert, well appearing, and in no distress  Mental status - alert, oriented to person, place, and time  Eyes - pupils equal and reactive, extraocular eye movements intact  Neck/lymph - supple, no significant adenopathy  Chest/CV - clear to auscultation, no wheezes, rales or rhonchi, symmetric air entry  Heart - normal rate, regular rhythm, normal S1, S2, no murmurs, rubs, clicks or gallops  Abdomen/GI - soft, nontender, nondistended, no masses or organomegaly  Musculoskeletal - no joint tenderness, deformity or swelling  Extremities -bilat amputations  Skin - normal coloration and turgor, no rashes, no suspicious skin lesions noted    Current Facility-Administered Medications   Medication Dose Route Frequency    QUEtiapine (SEROquel) tablet 25-50 mg  25-50 mg Oral QHS PRN    dextrose 40% (GLUTOSE) oral gel 1 Tube  15 g Oral PRN    glucagon (GLUCAGEN) injection 1 mg  1 mg IntraMUSCular PRN    dextrose (D50W) injection syrg 12.5-25 g  25-50 mL IntraVENous PRN    dextrose 10% infusion  70 mL/hr IntraVENous CONTINUOUS    carvedilol (COREG) tablet 3.125 mg  3.125 mg Oral BID WITH MEALS    lisinopril (PRINIVIL, ZESTRIL) tablet 2.5 mg  2.5 mg Oral DAILY    vancomycin (VANCOCIN) 1,000 mg in 0.9% sodium chloride (MBP/ADV) 250 mL  1,000 mg IntraVENous Q24H    famotidine (PF) (PEPCID) 20 mg in sodium chloride 0.9 % 10 mL injection  20 mg IntraVENous V57M    folic acid (FOLVITE) 1 mg, thiamine (B-1) 100 mg in 0.9% sodium chloride 50 mL ivpb   IntraVENous DAILY    norepinephrine (LEVOPHED) 4 mg in dextrose 5% 250 mL infusion  2-16 mcg/min IntraVENous TITRATE    0.9% sodium chloride infusion 250 mL  250 mL IntraVENous PRN    haloperidol lactate (HALDOL) injection 2 mg  2 mg IntraMUSCular Q4H PRN    nicotine (NICODERM CQ) 14 mg/24 hr patch 1 Patch  1 Patch TransDERmal DAILY    sodium chloride (NS) flush 5-10 mL  5-10 mL IntraVENous Q8H    sodium chloride (NS) flush 5-10 mL  5-10 mL IntraVENous PRN    aspirin delayed-release tablet 81 mg  81 mg Oral DAILY    HYDROcodone-acetaminophen (NORCO) 5-325 mg per tablet 1 Tab  1 Tab Oral Q4H PRN    lacosamide (VIMPAT) tablet 100 mg  100 mg Oral BID    levETIRAcetam (KEPPRA) tablet 500 mg  500 mg Oral BID    senna-docusate (PERICOLACE) 8.6-50 mg per tablet 2 Tab  2 Tab Oral DAILY    sodium chloride (NS) flush 5-10 mL  5-10 mL IntraVENous PRN    ondansetron (ZOFRAN) injection 4 mg  4 mg IntraVENous Q4H PRN    insulin lispro (HUMALOG) injection   SubCUTAneous AC&HS    piperacillin-tazobactam (ZOSYN) 4.5 g in 0.9% sodium chloride (MBP/ADV) 100 mL  4.5 g IntraVENous Q8H       Data Review:   @LABRCNT(Na,K,BUN,CREA,WBC,HGB,HCT,PLT,INR,TRP,TCHOL*,Triglyceride*,LDL*,LDLCPOC HDL*,HDL])@    TELEMETRY: nsr    Assessment/Plan:     Principal Problem:    Hypoglycemia (6/27/2017)    Active Problems:    Tobacco abuse (10/5/2009)      Overview: QUIT       ETOH abuse (6/10/2011)      Overview: 12 pk of beer/ day for \"years\" until a month and have ago      CAD (coronary artery disease) (6/25/2012) The current medical regimen is effective;  continue present plan and medications. Medical non-compliance (6/25/2012)      DM (diabetes mellitus) type II uncontrolled, periph vascular disorder ([facility] [address] (11/14/2012)The current medical regimen is effective;  continue present plan and medications. Sepsis ([facility] [address] (2/26/2014)      Acute renal failure ([facility] (12/5/2014)      Hypotension (12/5/2014)      S/P AKA (above knee amputation) bilateral ([facility] [address] (12/5/2014)      Overview: Due to PVD, diabetes etc      Malnutrition ([facility] (12/30/2014)      Underweight (12/30/2014)      Elevated brain natriuretic peptide (BNP) level (7/88/3293) has systolic heart failure. Add meds      EVANGELINA (acute kidney injury) ([facility] (6/28/2017) monitor labs      Hyperkalemia (6/28/2017)      Acute metabolic encephalopathy (7/94/3450)      Elevated liver enzymes (6/28/2017)      Anemia (6/28/2017)      Hypoxemia (6/28/2017)      Pleural effusion, bilateral (6/28/2017)      Pulmonary infiltrates (6/28/2017)    Will titrate up on chf meds.   Ok to transfer out      Abigail Dumont MD

## 2017-06-30 NOTE — PROGRESS NOTES
Bedside and Verbal shift change report given to [facility], RN (oncoming nurse) by [facility] LEIGHANN DAWN (offgoing nurse). Report included the following information SBAR, Kardex, ED Summary, Procedure Summary, Intake/Output, MAR, Recent Results and Cardiac Rhythm NSR. Skin assessment completed. Patient resting quietly in bed. Levophed gtt turned off, will continue to monitor BP and restart gtt if needed. /87, HR 91. No complaints of pain or discomfort.

## 2017-06-30 NOTE — PROGRESS NOTES
Critical Care Daily Progress Note: 6/30/2017    Hugo Vargas   Admission Date: 6/27/2017         The patient's chart is reviewed and the patient is discussed with the staff. Subjective:     Pt is a 59 yo [place] American male with a B AKA, CAD, DM2, chronic pancreatitis, recent perianal abscess, small perianal fistula, tobacco abuse, debility, and chronic pain. Pt presented with hypoglycemia form SNF. He developed hypotension and was transferred to CCU.  Central line was placed and he was started on pressors  He is doing better off pressors    Current Facility-Administered Medications   Medication Dose Route Frequency    lisinopril (PRINIVIL, ZESTRIL) tablet 10 mg  10 mg Oral DAILY    carvedilol (COREG) tablet 6.25 mg  6.25 mg Oral BID WITH MEALS    spironolactone (ALDACTONE) tablet 25 mg  25 mg Oral DAILY    QUEtiapine (SEROquel) tablet 25-50 mg  25-50 mg Oral QHS PRN    dextrose 40% (GLUTOSE) oral gel 1 Tube  15 g Oral PRN    glucagon (GLUCAGEN) injection 1 mg  1 mg IntraMUSCular PRN    dextrose (D50W) injection syrg 12.5-25 g  25-50 mL IntraVENous PRN    dextrose 10% infusion  70 mL/hr IntraVENous CONTINUOUS    vancomycin (VANCOCIN) 1,000 mg in 0.9% sodium chloride (MBP/ADV) 250 mL  1,000 mg IntraVENous Q24H    famotidine (PF) (PEPCID) 20 mg in sodium chloride 0.9 % 10 mL injection  20 mg IntraVENous G79Z    folic acid (FOLVITE) 1 mg, thiamine (B-1) 100 mg in 0.9% sodium chloride 50 mL ivpb   IntraVENous DAILY    norepinephrine (LEVOPHED) 4 mg in dextrose 5% 250 mL infusion  2-16 mcg/min IntraVENous TITRATE    0.9% sodium chloride infusion 250 mL  250 mL IntraVENous PRN    haloperidol lactate (HALDOL) injection 2 mg  2 mg IntraMUSCular Q4H PRN    nicotine (NICODERM CQ) 14 mg/24 hr patch 1 Patch  1 Patch TransDERmal DAILY    sodium chloride (NS) flush 5-10 mL  5-10 mL IntraVENous Q8H    sodium chloride (NS) flush 5-10 mL  5-10 mL IntraVENous PRN    aspirin delayed-release tablet 81 mg  81 mg Oral DAILY    HYDROcodone-acetaminophen (NORCO) 5-325 mg per tablet 1 Tab  1 Tab Oral Q4H PRN    lacosamide (VIMPAT) tablet 100 mg  100 mg Oral BID    levETIRAcetam (KEPPRA) tablet 500 mg  500 mg Oral BID    senna-docusate (PERICOLACE) 8.6-50 mg per tablet 2 Tab  2 Tab Oral DAILY    sodium chloride (NS) flush 5-10 mL  5-10 mL IntraVENous PRN    ondansetron (ZOFRAN) injection 4 mg  4 mg IntraVENous Q4H PRN    insulin lispro (HUMALOG) injection   SubCUTAneous AC&HS    piperacillin-tazobactam (ZOSYN) 4.5 g in 0.9% sodium chloride (MBP/ADV) 100 mL  4.5 g IntraVENous Q8H       Review of Systems    Constitutional:  negative for fever, chills, sweats  Cardiovascular:  negative for chest pain, palpitations, syncope, edema  Gastrointestinal:  negative for dysphagia, reflux, vomiting, diarrhea, abdominal pain, or melena  Neurologic:  negative for focal weakness, numbness, headache      Objective:     Vitals:    06/30/17 0501 06/30/17 0531 06/30/17 0600 06/30/17 0609   BP: 125/60 127/64 155/71    Pulse: 88 90 87    Resp: 17 12 13    Temp:       SpO2: 97%  98%    Weight:    106 lb 7.7 oz (48.3 kg)   Height:           Intake and Output:   06/28 1901 - 06/30 0700  In: 2914.7 [P.O.:1460; I.V.:1127.7]  Out: 7600 [Urine:7600]       Physical Exam:          Constitutional:  the patient is well developed and in no acute distress  EENMT:  Sclera clear, pupils equal, oral mucosa moist  Respiratory: some basilar crackles  Cardiovascular:  RRR without M,G,R  Gastrointestinal: soft and non-tender; with positive bowel sounds. Musculoskeletal: warm without cyanosis. There is bilateral aka.   Skin:  no jaundice or rashes, no wounds   Neurologic: no gross neuro deficits     Psychiatric:  alert and oriented x 3    LINES:  Central line    DRIPS:   nopne    CXR:   None this am    LAB  Recent Labs      06/29/17   2217  06/29/17   1846  06/29/17   1559  06/29/17   1155  06/29/17   0954   GLUCPOC  146*  104*  393*  135* 141*      Recent Labs      06/30/17 0445  06/29/17   2215  06/29/17   1610   06/29/17   0355   06/28/17   1415  06/28/17   0617  06/27/17   0750   WBC   --    --    --    --   11.4*   --    --   13.3*  13.5*   HGB  9.3*  8.8*  8.6*   < >  8.7*   < >  5.7*  7.0*  7.6*   HCT  27.2*  25.5*  25.3*   < >  25.3*   < >  17.1*  21.2*  22.6*   PLT   --    --    --    --   223   --    --   261  297   INR   --    --    --    --    --    --   1.0   --    --     < > = values in this interval not displayed. Recent Labs      06/30/17 0445 06/29/17 0355 06/28/17   2330  06/28/17 2010 06/28/17   1840  06/28/17   1415  06/28/17   1030   06/27/17   0750   NA  136  136   --    --    --   133*  131*   < >  133*   K  3.6  3.7   --   4.3   --   5.4*  5.4*   < >  4.9   CL  100  103   --    --    --   102  100   < >  102   CO2  26  22   --    --    --   21  19*   < >  23   GLU  132*  76   --    --    --   38*  97   < >  173*   BUN  37*  45*   --    --    --   56*  60*   < >  51*   CREA  0.96  1.01   --    --    --   1.19  1.19   < >  0.81   MG  2.3   --    --    --    --    --    --    --    --    CA  7.9*  7.8*   --    --    --   7.7*  7.2*   < >  8.2*   TROIQ   --    --   1.01*   --   0.96*   --   1.15*   --    --    ALB   --    --    --    --    --   2.0*   --    --   2.6*   TBILI   --    --    --    --    --   0.3   --    --   0.2   ALT   --    --    --    --    --   110*   --    --   183*   SGOT   --    --    --    --    --   79*   --    --   153*    < > = values in this interval not displayed.      Recent Labs      06/28/17   0910   PH  7.35   PCO2  34*   PO2  76   HCO3  18*     Recent Labs      06/28/17   1030   LAC  1.6       Assessment:  (Medical Decision Making)     Hospital Problems  Date Reviewed: 6/29/2017          Codes Class Noted POA    Elevated brain natriuretic peptide (BNP) level ICD-10-CM: R79.89  ICD-9-CM: 790.99  6/28/2017 Yes    Severe lv dysfunction     EVANGELINA (acute kidney injury) ([facility] [address] ICD-10-CM: N17.9  ICD-9-CM: 584.9  6/28/2017 Yes    stable    Hyperkalemia ICD-10-CM: E87.5  ICD-9-CM: 276.7  6/28/2017 No        Acute metabolic encephalopathy JOSEPH-38-ON: G93.41  ICD-9-CM: 348.31  6/28/2017 Yes        Elevated liver enzymes ICD-10-CM: R74.8  ICD-9-CM: 790.5  6/28/2017 Yes        Anemia ICD-10-CM: D64.9  ICD-9-CM: 285.9  6/28/2017 Yes        Hypoxemia ICD-10-CM: R09.02  ICD-9-CM: 799.02  6/28/2017 Yes    On nc    Pleural effusion, bilateral ICD-10-CM: J90  ICD-9-CM: 511.9  6/28/2017 Yes    Probably due to chf    Pulmonary infiltrates ICD-10-CM: R91.8  ICD-9-CM: 793.19  6/28/2017 Yes    rxed for pneumonia but infiltrates may be due to volume overload and chf    * (Principal)Hypoglycemia ICD-10-CM: E16.2  ICD-9-CM: 251.2  6/27/2017 Yes        Malnutrition ([facility] (Chronic) ICD-10-CM: E46  ICD-9-CM: 263.9  12/30/2014 Yes        Underweight (Chronic) ICD-10-CM: R63.6  ICD-9-CM: 783.22  12/30/2014 Yes        Acute renal failure ([facility] [address] ICD-10-CM: N17.9  ICD-9-CM: 584.9  12/5/2014 Yes        Hypotension ICD-10-CM: I95.9  ICD-9-CM: 458.9  12/5/2014 Yes    Better- off pressors    S/P AKA (above knee amputation) bilateral ([facility] (Chronic) ICD-10-CM: F36.331, E92.453  ICD-9-CM: V49.76  12/5/2014 Yes    Overview Signed 12/5/2014  3:54 PM by Errol Bermudez     Due to PVD, diabetes etc             Sepsis ([facility] (Chronic) ICD-10-CM: A41.9  ICD-9-CM: 038.9, 995.91  2/26/2014 Yes        DM (diabetes mellitus) type II uncontrolled, periph vascular disorder (HCC) (Chronic) ICD-10-CM: E11.51, E11.65  ICD-9-CM: 250.72, 443.81  11/14/2012 Yes        CAD (coronary artery disease) (Chronic) ICD-10-CM: I25.10  ICD-9-CM: 414.00  6/25/2012 Yes        Medical non-compliance (Chronic) ICD-10-CM: Z91.19  ICD-9-CM: V15.81  6/25/2012 Yes        ETOH abuse (Chronic) ICD-10-CM: F10.10  ICD-9-CM: 305.00  6/10/2011 Yes    Overview Signed 6/10/2011  6:38 PM by ARNULFO Hardwick     12 pk of beer/ day for \"years\" until a month and have ago Tobacco abuse (Chronic) ICD-10-CM: Z72.0  ICD-9-CM: 305.1  10/5/2009 Yes    Overview Signed 6/13/2017  5:28 PM by Chun Ledezma NP     QUIT                    Plan:  (Medical Decision Making)   1    cxr  2    Can probably move to floor soon  3    Follow up bnp  --    More than 50% of the time documented was spent in face-to-face contact with the patient and in the care of the patient on the floor/unit where the patient is located.     Fern Kessler MD

## 2017-06-30 NOTE — INTERDISCIPLINARY ROUNDS
Interdisciplinary team rounds were held 6/29/2017 with the following team members:Nursing, Nurse Practitioner, Palliative Care, Pastoral Care, Pharmacy, Physical Therapy, Physician, Respiratory Therapy and Clinical Coordinator. Plan of care discussed. See clinical pathway and/or care plan for interventions and desired outcomes.

## 2017-06-30 NOTE — CONSULTS
[facility] Consult Note           Referring Physician:  Dr. Karen Rhoades Date:  6/30/2017    Admit Date:  6/27/2017    Chief Complaint:  Anemia; heme positive stool    Subjective:     History of Present Illness:  Patient is a 58 y.o. male with PMH of chronic pancreatitis secondary to EtOH (abstinent x 2 years), uncontrolled IDDM, CAD, CHF, PAD s/p bilateral AKAs, who is seen in consultation at the request of Dr. General Weston for anemia and heme positive stool. He presented to the ER on 6/27 after being found to be hypoglycemic at [facility]F with BS in the 30's. In ER he was hypoxic, with BNP of 1125 and Chest CT showing pleural effusions. Hypotensive with SBP in the 80's,  transferred to CCU, and on Zosyn, vanc and levophed gtt. Hgb on admission was 7, but fell to 5.7 on 6/28 and transfused 2 units PRBCs with hgb today of 9.3. BNP on 6/29 was 3385. ECHO shows severe LV dysfunction (EF 30-35%) and he is being followed by cardiology and pulmonology with optimization of CHF treatment. CXR today shows post-inflammatory scarring but no effusions, and he has been weaned off levophed with stable BPs today. Plans are for transfer to the floor. However, he was found to have heme positive stool and for this reason plus anemia, we are asked to see. Patient had two BMs yesterday with no report of melena nor hematochezia per RN. Patient denies abdominal pain, nausea, vomiting, reflux, dysphagia, weight loss. Has not had previous EGD or colonoscopy and he refuses to have endoscopy. He does have elevated LFTs. On 6/27, his ALT was 183,  and alk phos 181 with normal t. Bili. On 6/28 , AST 79 and alk phos 149. LFTs were normal on 6/17/17. Acute hepatitis profile this admission negative. Also has elevated BUN/creatinine ratio. BUN on admission was 56 with creatinine 1.14 and today is 37 and 0.96 respectively. He reports a history of PUD 20 years ago.     He was recently admitted to the hospital earlier this month for perianal/perirectal abscess and fistula. 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 However, PSA was normal at 0.8. PMH:  Past Medical History:   Diagnosis Date    Alcohol abuse, in remission 6/13/2017    Arthritis     CAD (coronary artery disease) 2008    MI 2006 with stent, stent 2008    Chronic hyponatremia 3/3/2014    Chronic pain     generalized    Dyslipidemia     GERD (gastroesophageal reflux disease)     Hypercholesterolemia     Hypertension     under control with med    Insulin dependent diabetes mellitus ([facility] type 2 since 2005    insulin reliant. bs: \"120'S\" BUT RUNS HIGH    Medical non-compliance 6/25/2012    MI (myocardial infarction) ([facility]  2008    EF 55%    Other postprocedural status 1/2/2014 07/03/2013: S/P Right common femoral endarterectomy     PAD (peripheral artery disease) ([facility] 10/5/2009    10/24/12 Graft thrombectomy through leg incision of left iliac  to popliteal bypass and left popliteal to posterior tibial bypass -Dr. Corazon Wood  10/15/09 Left external Iliac artery to above the knee popliteal artery bypass graft propaten PTFE, stenting L EIA- Dr. Corazon Wood 10/5/09 Open amputation left 2nd, 3rd toes- Dr. Cedrick Bosch 11/6/08 Left common femoral, superficial femoral and profunda femoris art    PUD (peptic ulcer disease)     PVD (peripheral vascular disease) ([facility]     Seizures ([facility]     x1 about 2010?     Sepsis ([facility] 2/26/2014    Thrombosis of Left External Iliac to above knee popliteal artery bypass graft 9/23/2010    Tobacco abuse 10/5/2009       PSH:  Past Surgical History:   Procedure Laterality Date    AMPUTATION TOE,I-P JT  2008    3 toes on left foot    BYPASS GRAFT OTHR,ILIOFEMORAL  2009    left     CARDIAC SURG PROCEDURE UNLIST  2009    2-3 stents    HX ABOVE KNEE AMPUTATION Right 2014    HX AMPUTATION Left 11/14/2012    Left leg above the knee amputation     HX FEMORAL BYPASS  8/29/12    OCCLUDED GRAFT    HX THROMBECTOMY  7/02/12    Graft thrombectomy, left common iliac artery angioplasty and stent, left popliteal artery balloon angioplasty    HX THROMBECTOMY  10/22/2012    Graft thrombectomy through leg incision of left iliac to popliteal bypass and left popliteal to posterior tibial bypass    VASCULAR SURGERY PROCEDURE UNLIST  07/03/2013    R common femoral endarterectomy, re-do groin dissection    VASCULAR SURGERY PROCEDURE UNLIST  06/13/2011    R common femoral endarterectomy, left common and external iliac artery balloon angioplasty,  selective catheterization of left external iliac artery from right common femoral artery    VASCULAR SURGERY PROCEDURE UNLIST  06/25/2012    L Femoral-popliteal graft thrombectomy       Allergies: Allergies   Allergen Reactions    Contrast Agent [Iodine] Hives     IVP DYE/Contrast, pt medicated with prednisone 50mg x 3 and still had hives, itching. DID WELL THE NEXT TIME WITHOUT ANY SYMPTOMS    Restoril [Temazepam] Hives       Home Medications:  Prior to Admission medications    Medication Sig Start Date End Date Taking? Authorizing Provider   insulin glargine (LANTUS) 100 unit/mL injection 20 Units by SubCUTAneous route every morning. 6/16/17   Harvey Vital MD   insulin glargine (LANTUS) 100 unit/mL injection 35 Units by SubCUTAneous route nightly. 6/16/17   Harvey Vital MD   HYDROcodone-acetaminophen [facility]) 5-325 mg per tablet Take 1 Tab by mouth every four (4) hours as needed. Max Daily Amount: 6 Tabs. 6/16/17   Harvey Vital MD   polyethylene glycol [facility]) 17 gram packet Take 1 Packet by mouth daily as needed. 6/16/17   Harvey Vital MD   senna-docusate (PERICOLACE) 8.6-50 mg per tablet Take 2 Tabs by mouth daily.  6/16/17   Harvey Vital MD   lacosamide (VIMPAT) 100 mg tab tablet Take 100 mg by mouth two (2) times a day. Historical Provider   levETIRAcetam (KEPPRA) 500 mg tablet Take 1 Tab by mouth two (2) times a day. 1/30/16   Carlyle Varner MD   atorvastatin (LIPITOR) 80 mg tablet Take 80 mg by mouth daily. Mesfin Waddell MD   aspirin delayed-release 81 mg tablet Take 81 mg by mouth daily. Mesfin Waddell MD   gabapentin (NEURONTIN) 300 mg capsule Take 2 Caps by mouth three (3) times daily. 11/27/12   ARNULFO Coronel   lisinopril (PRINIVIL, ZESTRIL) 2.5 mg tablet Take 1 Tab by mouth daily. 1/18/12   Otis Russell MD   ranitidine (ZANTAC) 150 mg tablet Take 150 mg by mouth two (2) times a day.     Historical Provider       Hospital Medications:  Current Facility-Administered Medications   Medication Dose Route Frequency    lisinopril (PRINIVIL, ZESTRIL) tablet 10 mg  10 mg Oral DAILY    carvedilol (COREG) tablet 6.25 mg  6.25 mg Oral BID WITH MEALS    spironolactone (ALDACTONE) tablet 25 mg  25 mg Oral DAILY    furosemide (LASIX) injection 40 mg  40 mg IntraVENous BID    levETIRAcetam (KEPPRA) tablet 250 mg  250 mg Oral BID    QUEtiapine (SEROquel) tablet 25-50 mg  25-50 mg Oral QHS PRN    dextrose 40% (GLUTOSE) oral gel 1 Tube  15 g Oral PRN    glucagon (GLUCAGEN) injection 1 mg  1 mg IntraMUSCular PRN    dextrose (D50W) injection syrg 12.5-25 g  25-50 mL IntraVENous PRN    dextrose 10% infusion  70 mL/hr IntraVENous CONTINUOUS    vancomycin (VANCOCIN) 1,000 mg in 0.9% sodium chloride (MBP/ADV) 250 mL  1,000 mg IntraVENous Q24H    famotidine (PF) (PEPCID) 20 mg in sodium chloride 0.9 % 10 mL injection  20 mg IntraVENous U59I    folic acid (FOLVITE) 1 mg, thiamine (B-1) 100 mg in 0.9% sodium chloride 50 mL ivpb   IntraVENous DAILY    norepinephrine (LEVOPHED) 4 mg in dextrose 5% 250 mL infusion  2-16 mcg/min IntraVENous TITRATE    0.9% sodium chloride infusion 250 mL  250 mL IntraVENous PRN    haloperidol lactate (HALDOL) injection 2 mg  2 mg IntraMUSCular Q4H PRN    nicotine (NICODERM CQ) 14 mg/24 hr patch 1 Patch  1 Patch TransDERmal DAILY    sodium chloride (NS) flush 5-10 mL  5-10 mL IntraVENous Q8H    sodium chloride (NS) flush 5-10 mL  5-10 mL IntraVENous PRN    aspirin delayed-release tablet 81 mg  81 mg Oral DAILY    HYDROcodone-acetaminophen (NORCO) 5-325 mg per tablet 1 Tab  1 Tab Oral Q4H PRN    lacosamide (VIMPAT) tablet 100 mg  100 mg Oral BID    senna-docusate (PERICOLACE) 8.6-50 mg per tablet 2 Tab  2 Tab Oral DAILY    sodium chloride (NS) flush 5-10 mL  5-10 mL IntraVENous PRN    ondansetron (ZOFRAN) injection 4 mg  4 mg IntraVENous Q4H PRN    insulin lispro (HUMALOG) injection   SubCUTAneous AC&HS    piperacillin-tazobactam (ZOSYN) 4.5 g in 0.9% sodium chloride (MBP/ADV) 100 mL  4.5 g IntraVENous Q8H       Social History:  Patient quit drinking EtOH 2 years ago. Smokes 1/2 ppd. Family History:  Family History   Problem Relation Age of Onset   [employer] Stroke Mother     Hypertension Mother     Diabetes Other    negative for GI malignancy. Review of Systems:  A detailed 10 system ROS is obtained, with pertinent positives as listed above and in admission H&P. He denies chest pain qand SOB. All others are negative. Diet:  diabetic    Objective:     Physical Exam:  Vitals:  Visit Vitals    BP 97/51    Pulse 85    Temp 98.1 °F (36.7 °C)    Resp 18    Ht 3' 5\" (1.041 m)  Comment: bilateral AKA    Wt 48.3 kg (106 lb 7.7 oz)    SpO2 96%    BMI 44.54 kg/m2     Gen:  Pt is alert, cooperative, no acute distress  Skin:  Extremities and face reveal no rashes. HEENT: Sclerae anicteric. Extra-occular muscles are intact. No oral ulcers. No abnormal pigmentation of the lips. The neck is supple. Cardiovascular: Regular rate and rhythm. No murmurs, gallops, or rubs. Respiratory:  Comfortable breathing with no accessory muscle use. Clear breath sounds anteriorly with no wheezes, rales, or rhonchi. GI:  Abdomen nondistended, soft, and nontender.   Normal active bowel sounds. No enlargement of the liver or spleen. No masses palpable. Rectal:  Deferred  Musculoskeletal:  S/p bilateral AKAs    Neurological:  Gross memory appears intact. Patient is alert and oriented. Psychiatric:  Mood appears appropriate with judgement intact.       Laboratory:    Recent Labs      06/30/17   0445  06/29/17   2215  06/29/17   1610  06/29/17   0951  06/29/17   0355  06/28/17   2330  06/28/17 2010 06/28/17   1415  06/28/17   1030  06/28/17   0617   WBC   --    --    --    --   11.4*   --    --    --    --   13.3*   HGB  9.3*  8.8*  8.6*  8.9*  8.7*  8.9*   --   5.7*   --   7.0*   HCT  27.2*  25.5*  25.3*  26.4*  25.3*  25.5*   --   17.1*   --   21.2*   PLT   --    --    --    --   223   --    --    --    --   261   MCV   --    --    --    --   82.4   --    --    --    --   81.7   NA  136   --    --    --   136   --    --   133*  131*  130*   K  3.6   --    --    --   3.7   --   4.3  5.4*  5.4*  5.8*   CL  100   --    --    --   103   --    --   102  100  98   CO2  26   --    --    --   22   --    --   21  19*  19*   BUN  37*   --    --    --   45*   --    --   56*  60*  58*   CREA  0.96   --    --    --   1.01   --    --   1.19  1.19  1.20   CA  7.9*   --    --    --   7.8*   --    --   7.7*  7.2*  7.9*   MG  2.3   --    --    --    --    --    --    --    --    --    GLU  132*   --    --    --   76   --    --   38*  97  45*   AP   --    --    --    --    --    --    --   149*   --    --    SGOT   --    --    --    --    --    --    --   79*   --    --    ALT   --    --    --    --    --    --    --   110*   --    --    TBILI   --    --    --    --    --    --    --   0.3   --    --    ALB   --    --    --    --    --    --    --   2.0*   --    --    TP   --    --    --    --    --    --    --   6.9   --    --    LPSE   --    --    --    --    --    --    --    --   164   --    PTP   --    --    --    --    --    --    --   10.9   --    --    INR   --    --    --    --    --    -- --   1.0   --    --           Assessment:     Principal Problem:    Hypoglycemia (6/27/2017)    Active Problems:    Tobacco abuse (10/5/2009)      Overview: QUIT       ETOH abuse (6/10/2011)      Overview: 12 pk of beer/ day for \"years\" until a month and have ago      CAD (coronary artery disease) (6/25/2012)      Medical non-compliance (6/25/2012)      DM (diabetes mellitus) type II uncontrolled, periph vascular disorder ([address] (11/14/2012)      Sepsis ([address] (2/26/2014)      Acute renal failure ([address] (12/5/2014)      Hypotension (12/5/2014)      S/P AKA (above knee amputation) bilateral ([address] (12/5/2014)      Overview: Due to PVD, diabetes etc      Malnutrition ([address] (12/30/2014)      Underweight (12/30/2014)      Elevated brain natriuretic peptide (BNP) level (6/28/2017)      EVANGELINA (acute kidney injury) ([address] (6/28/2017)      Hyperkalemia (6/28/2017)      Acute metabolic encephalopathy (2/86/3704)      Elevated liver enzymes (6/28/2017)      Anemia (6/28/2017)      Hypoxemia (6/28/2017)      Pleural effusion, bilateral (6/28/2017)      Pulmonary infiltrates (6/28/2017)      a 58 y.o. male with PMH of chronic pancreatitis secondary to EtOH (abstinent x 2 years), uncontrolled IDDM, CAD, CHF, PAD s/p bilateral AKAs, severe LV dysfunction who is admitted for hypoglycemia, CHF exacerbation/pleural effusions/pulmonary infiltrates and seen by GI for anemia and heme positive stool. He has elevated BUN/creatinine ratio but no overt GIB, and the patient does NOT want endoscopic evaluation. Plan:   1) consider empiric PPI instead of H2 blocker  2) monitor hgb and for evidence of overt bleeding  3) let us know if patient changes his mind about EGD  4) LFT elevation this admission likely related to passive hepatic congestion from CHF or possibly meds. Acute hepatitis profile negative and LFTs normal earlier this month    Patient is seen and examined in collaboration with Dr. Soledad West.   Assessment and plan as per  Raul.   ARNULFO Greer

## 2017-06-30 NOTE — INTERDISCIPLINARY ROUNDS
Interdisciplinary team rounds were held 6/30/2017 with the following team members:Nursing, Nurse Practitioner, Nutrition, Palliative Care, Pharmacy, Physician, Physician's Assistant, Respiratory Therapy and Clinical Coordinator and the patient. Plan of care discussed. See clinical pathway and/or care plan for interventions and desired outcomes.

## 2017-06-30 NOTE — PROGRESS NOTES
Hospitalist Progress Note    2017  Admit Date: 2017  7:36 AM   NAME: Jessica Michelle   :  1955   DOS:              17  MRN:  [id number]   Attending: Lacy Rodriguez MD  PCP:  Berta Chaves MD  Treatment Team: Attending Provider: Marlys Carr MD; Consulting Provider: Светлана Sheffield MD; Consulting Provider: Stevan Macias MD; Speech Language Pathologist: KRYSTLE Huerta    Full Code     SUBJECTIVE:   As previously documented: \" Mr. Charly Perdue is a 58 yom with a hx of dm, seizure, CAD severe pvd s/p bilateral aka from snf with hypoglycemia reportedly in 35s. Given dextrose by EMS and glucose improved on admission to ER. Started on vanco and zosyn in ER for suspected LLL pneumonia. Recently admitted for perirectal fistula and d/c top SNF on . HbA1c:11.8 on 17. Transferred to ICU for unresponsiveness, Low BP and started on fluid bolus pressors. Hgb dropped to 5.7 from 8.9 given 1u prbc with lasix. TTE showed EF 35% with hypokinesis. Seen by Cards and pulm. \"       17   Feels a bit better, no SOB, alert and oriented. Hgb stable. Good UOP. Limited ROS reviewed  due to clinical status and negative except for positive in HPI. Allergies   Allergen Reactions    Contrast Agent [Iodine] Hives     IVP DYE/Contrast, pt medicated with prednisone 50mg x 3 and still had hives, itching.     DID WELL THE NEXT TIME WITHOUT ANY SYMPTOMS    Restoril [Temazepam] Hives     Current Facility-Administered Medications   Medication Dose Route Frequency Provider Last Rate Last Dose    lisinopril (PRINIVIL, ZESTRIL) tablet 10 mg  10 mg Oral DAILY Abigail Dumont MD        carvedilol (COREG) tablet 6.25 mg  6.25 mg Oral BID WITH MEALS Abigail Dumont MD        spironolactone (ALDACTONE) tablet 25 mg  25 mg Oral DAILY Abigail Dumont MD        furosemide (LASIX) injection 40 mg  40 mg IntraVENous BID Lacy Rodriguez MD        QUEtiapine (SEROquel) tablet 25-50 mg  25-50 mg Oral QHS PRN Micah Elaine MD        dextrose 40% (GLUTOSE) oral gel 1 Tube  15 g Oral PRN Micah Elaine MD        glucagon [facility]) injection 1 mg  1 mg IntraMUSCular PRN Micah Elaine MD        dextrose (D50W) injection syrg 12.5-25 g  25-50 mL IntraVENous PRN Micah Elaine MD   25 g at 06/29/17 0246    dextrose 10% infusion  70 mL/hr IntraVENous CONTINUOUS Micah Elaine MD   Stopped at 06/29/17 0630    vancomycin (VANCOCIN) 1,000 mg in 0.9% sodium chloride (MBP/ADV) 250 mL  1,000 mg IntraVENous Q24H Jessica Hoover  mL/hr at 06/29/17 1332 1,000 mg at 06/29/17 1332    famotidine (PF) (PEPCID) 20 mg in sodium chloride 0.9 % 10 mL injection  20 mg IntraVENous Q24H Eulalia Moralez MD   20 mg at 81/31/32 3887    folic acid (FOLVITE) 1 mg, thiamine (B-1) 100 mg in 0.9% sodium chloride 50 mL ivpb   IntraVENous DAILY Eulalia Moralez MD        norepinephrine (LEVOPHED) 4 mg in dextrose 5% 250 mL infusion  2-16 mcg/min IntraVENous TITRATE Eulalia Moralez MD   Stopped at 06/30/17 0730    0.9% sodium chloride infusion 250 mL  250 mL IntraVENous PRN Dale Curry NP        haloperidol lactate (HALDOL) injection 2 mg  2 mg IntraMUSCular Q4H PRN Eulalia Moralez MD   2 mg at 06/29/17 0118    nicotine (NICODERM CQ) 14 mg/24 hr patch 1 Patch  1 Patch TransDERmal DAILY Eulalia Moralez MD   1 Patch at 06/29/17 0938    sodium chloride (NS) flush 5-10 mL  5-10 mL IntraVENous Q8H Efra Sim MD   10 mL at 06/30/17 0600    sodium chloride (NS) flush 5-10 mL  5-10 mL IntraVENous PRN Efra Sim MD        aspirin delayed-release tablet 81 mg  81 mg Oral DAILY Fabian Redd MD   81 mg at 06/29/17 0938    HYDROcodone-acetaminophen (NORCO) 5-325 mg per tablet 1 Tab  1 Tab Oral Q4H PRN Fabian Redd MD   1 Tab at 06/29/17 1631    lacosamide (VIMPAT) tablet 100 mg  100 mg Oral BID Fabian Redd MD   100 mg at 06/29/17 2213    levETIRAcetam (KEPPRA) tablet 500 mg  500 mg Oral BID Ty Cary MD   500 mg at 17 1751    senna-docusate (Anna Chalk) 8.6-50 mg per tablet 2 Tab  2 Tab Oral DAILY Ty Cary MD   2 Tab at 17 6423    sodium chloride (NS) flush 5-10 mL  5-10 mL IntraVENous PRN Ty Cary MD        [facility]ron [facility] injection 4 mg  4 mg IntraVENous Q4H PRN Ty Cary MD        insulin lispro (HUMALOG) injection   SubCUTAneous AC&HS Ty Cary MD   Stopped at 17 2200    piperacillin-tazobactam (ZOSYN) 4.5 g in 0.9% sodium chloride (MBP/ADV) 100 mL  4.5 g IntraVENous Q8H Radha Tariq MD 25 mL/hr at 17 0148 4.5 g at 17 0148         PHYSICAL EXAM     Visit Vitals    /62    Pulse 85    Temp 98.7 °F (37.1 °C)    Resp 11    Ht 3' 5\" (1.041 m)  Comment: bilateral AKA    Wt 48.3 kg (106 lb 7.7 oz)    SpO2 95%    BMI 44.54 kg/m2      Temp (24hrs), Av.7 °F (37.1 °C), Min:98.4 °F (36.9 °C), Max:99.2 °F (37.3 °C)    Oxygen Therapy  O2 Sat (%): 95 % (17)  Pulse via Oximetry: 86 beats per minute (17)  O2 Device: Nasal cannula (17)  O2 Flow Rate (L/min): 2 l/min (17)  FIO2 (%): 28 % (17)    Intake/Output Summary (Last 24 hours) at 17  Last data filed at 17 0322   Gross per 24 hour   Intake          1733.74 ml   Output             5200 ml   Net         -3466.26 ml        Physical Exam:  General:         Awake,  Afebrile. Looks older than stated age. HEENT:               NCAT. No obvious deformity. Lungs:                  Decreased air entry b/l. Crackles at the base b/l. NC 5L  Cardiovascular:   RRR. No m/r/g. B/l BKA  Abdomen:       S/nt/nd. Bowel sounds normal. Wearing dipper. Skin:         No rashes or lesions. Not Jaundiced  Neurologic:    Awake. No gross focal deficit.   PSych:  Anxious                DIAGNOSTIC STUDIES      Data Review:   Recent Results (from the past 24 hour(s))   HGB & HCT Collection Time: 06/29/17  9:51 AM   Result Value Ref Range    HGB 8.9 (L) 13.6 - 17.2 g/dL    HCT 26.4 (L) 41.1 - 50.3 %   BNP    Collection Time: 06/29/17  9:51 AM   Result Value Ref Range    BNP 2285 pg/mL   GLUCOSE, POC    Collection Time: 06/29/17  9:54 AM   Result Value Ref Range    Glucose (POC) 141 (H) 65 - 100 mg/dL   GLUCOSE, POC    Collection Time: 06/29/17 11:55 AM   Result Value Ref Range    Glucose (POC) 135 (H) 65 - 100 mg/dL   GLUCOSE, POC    Collection Time: 06/29/17  3:59 PM   Result Value Ref Range    Glucose (POC) 393 (H) 65 - 100 mg/dL   HGB & HCT    Collection Time: 06/29/17  4:10 PM   Result Value Ref Range    HGB 8.6 (L) 13.6 - 17.2 g/dL    HCT 25.3 (L) 41.1 - 50.3 %   GLUCOSE, POC    Collection Time: 06/29/17  6:46 PM   Result Value Ref Range    Glucose (POC) 104 (H) 65 - 100 mg/dL   HGB & HCT    Collection Time: 06/29/17 10:15 PM   Result Value Ref Range    HGB 8.8 (L) 13.6 - 17.2 g/dL    HCT 25.5 (L) 41.1 - 50.3 %   GLUCOSE, POC    Collection Time: 06/29/17 10:17 PM   Result Value Ref Range    Glucose (POC) 146 (H) 65 - 100 mg/dL   HGB & HCT    Collection Time: 06/30/17  4:45 AM   Result Value Ref Range    HGB 9.3 (L) 13.6 - 17.2 g/dL    HCT 27.2 (L) 41.1 - 38.6 %   METABOLIC PANEL, BASIC    Collection Time: 06/30/17  4:45 AM   Result Value Ref Range    Sodium 136 136 - 145 mmol/L    Potassium 3.6 3.5 - 5.1 mmol/L    Chloride 100 98 - 107 mmol/L    CO2 26 21 - 32 mmol/L    Anion gap 10 7 - 16 mmol/L    Glucose 132 (H) 65 - 100 mg/dL    BUN 37 (H) 8 - 23 MG/DL    Creatinine 0.96 0.8 - 1.5 MG/DL    GFR est AA >60 >60 ml/min/1.73m2    GFR est non-AA >60 >60 ml/min/1.73m2    Calcium 7.9 (L) 8.3 - 10.4 MG/DL   MAGNESIUM    Collection Time: 06/30/17  4:45 AM   Result Value Ref Range    Magnesium 2.3 1.8 - 2.4 mg/dL       All Micro Results     Procedure Component Value Units Date/Time    CULTURE, URINE [[id number]]  (Abnormal) Collected:  06/28/17 1534    Order Status:  Completed Specimen:  Urine from Cath Urine Updated:  06/30/17 0813     Special Requests: NO SPECIAL REQUESTS        Culture result: >100,000  COLONIES/mL  YEAST   (A)       SUBCULTURE IN PROGRESS       CULTURE, BLOOD [[id number]] Collected:  06/27/17 0938    Order Status:  Completed Specimen:  Whole Blood from Blood Updated:  06/30/17 0726     Special Requests: RIGHT FOREARM        Culture result: NO GROWTH 3 DAYS       CULTURE, BLOOD [[id number]] Collected:  06/27/17 0932    Order Status:  Completed Specimen:  Whole Blood from Blood Updated:  06/30/17 0726     Special Requests: RIGHT ANTECUBITAL        Culture result: NO GROWTH 3 DAYS       MRSA SCREEN - PCR (NASAL) [[id number]] Collected:  06/28/17 0655    Order Status:  Completed Specimen:  Nasal from Nasal Swab Updated:  06/28/17 1032     Special Requests: NO SPECIAL REQUESTS        Culture result:         MRSA target DNA is not detected (presumptive not colonized with MRSA)          Imaging Feroz Conradi /Studies:    CXR Results  (Last 48 hours)               06/27/17 0833  XR CHEST PA LAT Final result    Impression:  IMPRESSION: Suspicious for right pneumonia, in the differential is asymmetric   pulmonary edema. Correlate clinically. Narrative:  2 View Chest X-Ray 6/27/2017 8:33 AM       INDICATION: Poorly controlled diabetes, cough       COMPARISON: 12/27/2014       FINDINGS: Upright AP and Lateral views are submitted. The cardiac and   mediastinal contours are normal. Lungs are adequately inflated and the left lung   clear. In the right lung there are clearly increased hazy and reticular lung   densities. There is slight pleural thickening laterally. No pneumothorax. Vascularity seems appropriate. CT Results  (Last 48 hours)               06/27/17 1048  CT CHEST WO CONT Final result    Impression:  IMPRESSION:       1. Bilateral pleural effusions and right lower lobe consolidation. 2. Calcified pancreatic head mass.  This appearance may be secondary to chronic   pancreatitis. Correlate with medical history and if indicated with a pancreatic   protocol abdominal CT. Narrative:  CT CHEST WITHOUT CONTRAST 6/27/2017       HISTORY: Mild hypoxia in the ER. Right-sided densities on chest radiograph. TECHNIQUE: Noncontrast axial images were obtained through the chest.       COMPARISON: PA lateral chest x-ray from the same day       FINDINGS: Bilateral pleural effusions are present. Consolidation is present in   the right lower lobe. Paraseptal emphysematous changes are present in the upper   lobes. There is smooth thickening of the interstitial markings in the lung   apices. This finding may be present with interstitial edema. The central airways   are patent. There is a calcified granuloma in the periphery of the right upper   lobe. A few calcified right hilar lymph nodes are present, suggesting prior   granulomatous inflammation. Coronary artery atherosclerotic calcifications are   present. Included images of the upper abdomen demonstrate normal-appearing adrenal glands   and stones within the gallbladder. Multiple pancreatic head calcifications are   present. This finding may be present with chronic pancreatitis. This is similar   in appearance to CTA June 6, 2013. Labs and Studies from previous 24 hours have been personally reviewed by myself        TTE:    SUMMARY:    -  Left ventricle: The ventricle was mildly dilated. Systolic function was  severely reduced. Ejection fraction was estimated in the range of 30 % to 35   %. There was severe diffuse hypokinesis. -  Right ventricle: The ventricle was mildly dilated. Systolic function was  reduced. -  Left atrium: The atrium was mildly dilated. -  Right atrium: The atrium was mildly dilated. -  Inferior vena cava, hepatic veins: The inferior vena cava was dilated. The  respirophasic change in diameter was less than 50%. -  Mitral valve:  There was moderate regurgitation. -  Tricuspid valve: There was mild to moderate regurgitation. ASSESSMENT      Active Hospital Problems    Diagnosis Date Noted    Elevated brain natriuretic peptide (BNP) level 06/28/2017    EVANGELINA (acute kidney injury) ([facility] [address] 06/28/2017    Hyperkalemia 06/28/2017    Acute metabolic encephalopathy 89/12/9421    Elevated liver enzymes 06/28/2017    Anemia 06/28/2017    Hypoxemia 06/28/2017    Pleural effusion, bilateral 06/28/2017    Pulmonary infiltrates 06/28/2017    Hypoglycemia 06/27/2017    Underweight 12/30/2014    Malnutrition ([address] 12/30/2014    S/P AKA (above knee amputation) bilateral ([address] 12/05/2014     Due to PVD, diabetes etc      Acute renal failure ([address] 12/05/2014    Hypotension 12/05/2014    Sepsis ([facility] [address] 02/26/2014    DM (diabetes mellitus) type II uncontrolled, periph vascular disorder ([facility] [address] 11/14/2012    Medical non-compliance 06/25/2012    CAD (coronary artery disease) 06/25/2012    ETOH abuse 06/10/2011     12 pk of beer/ day for \"years\" until a month and have ago      Tobacco abuse 10/05/2009     QUIT          Plan:    - Acute Metabolic Encephalopathy: unclear etiology - resolved <10 min. Could be 2/2 Hypoxia, Low BP, cardiac event, ? seizure. Monitor on tele. Consider CT head vs MRI brain. Continue Vimpat/Keppra - decrease keppra based on levels. - hx of noncomplaince  - Shock likely Septic (PNA/UTI) vs Cardiogenic vs Hypovolemia/Anemia - on Zosyn/vanc, follow Cultures, Off pressors, monitor closely, WBCs trending down. - Acute Hypoxic Resp failure: LLL PNA, on Zosyn/Vanc.  - Severe Systolic CHF with EF 27%: Appreciate cards, started on low dose BB/ACEIs. Ischemic workup per cards. Diuresing on Lasix. - Anemia: ? GIBleed +ve Stool occult blood, monitor , US abd unremrkable. GI consult. - Hyperkalemia - Resolved  - On thiamine due to hx of alcohol abuse  - Hypoglycemia:  holding lantus due to low BS - monitor.  Continue ISS      DVT Prophylaxis: SCDs  CODE Status: Full CODE  Plan of Care Discussed with: patient. Care team. Family contacts below:    Home Work [address] [phone]  [address] [place] [address] Street Spouse [phone]       Medical Risk: high due to Shock, Sepsis, Severe CHF. Disposition: Monitor in ICU, transfer to tele floor later today.      Sandoval Elizabeth MD  06/30/17

## 2017-06-30 NOTE — PROGRESS NOTES
LMNAVJOT recieved information from Daryle Bar with [facility] that pt was D/C from [facility] and referred for their services then admitted here after being home 1 day. They will accept pt back for home services at D/C. Will follow plan of care to determine what supportive care needs pt will have for discharge.

## 2017-06-30 NOTE — PROGRESS NOTES
Bedside and Verbal shift change report given to Jannetta Koyanagi, [facility] (oncoming nurse) by Stefan Howell RN (offgoing nurse). Report included the following information SBAR, Kardex, ED Summary, Procedure Summary, Intake/Output, MAR, Recent Results and Cardiac Rhythm NSR. Upon walking into patient's room, patient has pulled out craven, leads, and gown. Dr Geoffry Condon called and Telephone order received to not transfer patient at this time, to make urology consult to assess trauma from craven removal, and for soft wrist restraints.

## 2017-06-30 NOTE — PROGRESS NOTES
Dr Jacques Green notified of new lab results: Keppra 46.1 and (+) occult blood in stool. No orders received at this time.

## 2017-07-01 PROBLEM — I50.22 CHRONIC SYSTOLIC CONGESTIVE HEART FAILURE (HCC): Status: ACTIVE | Noted: 2017-01-01

## 2017-07-01 NOTE — PROGRESS NOTES
TRANSFER - OUT REPORT:    Verbal report given to Driss Bergeron RN on Radha Reich  being transferred to [phone] for routine progression of care       Report consisted of patients Situation, Background, Assessment and   Recommendations(SBAR). Information from the following report(s) SBAR, Kardex, ED Summary, Procedure Summary, MAR, Recent Results and Cardiac Rhythm NSR was reviewed with the receiving nurse. Lines:   Quad Lumen 06/28/17 Left Internal jugular (Active)   Central Line Being Utilized Yes 7/1/2017  7:32 AM   Criteria for Appropriate Use Irritant/vesicant medication 7/1/2017  7:32 AM   Site Assessment Clean, dry, & intact 7/1/2017  7:32 AM   Infiltration Assessment 0 7/1/2017  7:32 AM   Affected Extremity/Extremities Color distal to insertion site pink (or appropriate for race) 7/1/2017  7:32 AM   Date of Last Dressing Change 06/28/17 7/1/2017  7:32 AM   Dressing Status Clean, dry, & intact 7/1/2017  7:32 AM   Dressing Type Disk with Chlorhexadine gluconate (CHG); Transparent 7/1/2017  7:32 AM   Proximal Hub Color/Line Status White;Capped;Flushed;Patent 7/1/2017  7:32 AM   Positive Blood Return (Medial Site) Yes 7/1/2017  7:32 AM   Medial 1 Hub Color/Line Status Gray;Capped;Flushed;Patent 7/1/2017  7:32 AM   Positive Blood Return (Lateral Site) Yes 7/1/2017  7:32 AM   Medial 2 Hub Color/Line Status Blue;Capped;Flushed;Patent 7/1/2017  7:32 AM   Positive Blood Return (Site #3) Yes 7/1/2017  7:32 AM   Distal Hub Color/Line Status Brown;Capped;Flushed;Patent 7/1/2017  7:32 AM   Positive Blood Return (Site #4) Yes 7/1/2017  7:32 AM   Alcohol Cap Used No 7/1/2017  7:32 AM       Peripheral IV 07/01/17 Left Wrist (Active)   Site Assessment Clean, dry, & intact 7/1/2017  7:32 AM   Phlebitis Assessment 0 7/1/2017  7:32 AM   Infiltration Assessment 0 7/1/2017  7:32 AM   Dressing Status Clean, dry, & intact 7/1/2017  7:32 AM   Dressing Type Tape;Transparent 7/1/2017  7:32 AM   Hub Color/Line Status Pink;Flushed;Patent 7/1/2017  7:32 AM   Alcohol Cap Used No 7/1/2017  7:32 AM       Peripheral IV 07/01/17 Right Forearm (Active)   Site Assessment Clean, dry, & intact 7/1/2017  7:32 AM   Phlebitis Assessment 0 7/1/2017  7:32 AM   Infiltration Assessment 0 7/1/2017  7:32 AM   Dressing Status Clean, dry, & intact 7/1/2017  7:32 AM   Dressing Type Tape;Transparent 7/1/2017  7:32 AM   Hub Color/Line Status Pink;Capped;Flushed;Patent 7/1/2017  7:32 AM   Alcohol Cap Used No 7/1/2017  7:32 AM        Opportunity for questions and clarification was provided.       Patient transported with:   Monitor  Tech

## 2017-07-01 NOTE — PROGRESS NOTES
Hospitalist Progress Note    2017  Admit Date: 2017  7:36 AM   NAME: Alfredo Bourgeois   :  1955   DOS:              17  MRN:  [id number]   Attending: Julia Novak MD  PCP:  Amie Ngo MD  Treatment Team: Attending Provider: Froylan Angelucci, MD; Consulting Provider: Ashok Coyne MD; Consulting Provider: Carol Valles MD; Speech Language Pathologist: Isabella Pena, KRYSTLE; Consulting Provider: Rocky Holloway MD    Full Code     SUBJECTIVE:   As previously documented: \" Mr. Delmis Celis is a 58 yom with a hx of dm, seizure, CAD severe pvd s/p bilateral aka from snf with hypoglycemia reportedly in 35s. Given dextrose by EMS and glucose improved on admission to ER. Started on vanco and zosyn in ER for suspected LLL pneumonia. Recently admitted for perirectal fistula and d/c to SNF on . HbA1c:11.8 on 17. Transferred to ICU for unresponsiveness, Low BP and started on fluid bolus and pressors. Hgb dropped to 5.7 from 8.9 given 1u prbc with lasix. TTE showed EF 35% with hypokinesis. Seen by Cards and pulm. ALso Seen by GI and refuses EGD for GIBleed workup. \"       17   Feels better, no SOB, alert and oriented. Hgb stable. Had pulled out craven lasty night with blood clots, Started on CBI and urology consulted. Limited ROS reviewed  due to clinical status and negative except for positive in HPI. Allergies   Allergen Reactions    Contrast Agent [Iodine] Hives     IVP DYE/Contrast, pt medicated with prednisone 50mg x 3 and still had hives, itching.     DID WELL THE NEXT TIME WITHOUT ANY SYMPTOMS    Restoril [Temazepam] Hives     Current Facility-Administered Medications   Medication Dose Route Frequency Provider Last Rate Last Dose    Vancomycin trough reminder   Other Larry Prince MD        insulin glargine (LANTUS) injection 20 Units  20 Units SubCUTAneous DAILY Julia Novak MD        fluconazole (DIFLUCAN) 200mg/100 mL IVPB (premix)  200 mg IntraVENous ONCE Yoon Anne MD       [facility] [place] ON 7/2/2017] fluconazole (DIFLUCAN) tablet 200 mg  200 mg Oral DAILY Yoon Anne MD        lisinopril (PRINIVIL, ZESTRIL) tablet 10 mg  10 mg Oral DAILY Chloe Silva MD   10 mg at 06/30/17 0900    carvedilol (COREG) tablet 6.25 mg  6.25 mg Oral BID WITH MEALS Chloe Silva MD   6.25 mg at 07/01/17 6992    spironolactone (ALDACTONE) tablet 25 mg  25 mg Oral DAILY Chloe Silva MD   25 mg at 06/30/17 0901    furosemide (LASIX) injection 40 mg  40 mg IntraVENous BID Yoon Anne MD   40 mg at 06/30/17 1716    levETIRAcetam (KEPPRA) tablet 250 mg  250 mg Oral BID Yoon Anne MD   250 mg at 06/30/17 1716    QUEtiapine (SEROquel) tablet 25-50 mg  25-50 mg Oral QHS PRN Jany Peres MD        dextrose 40% (GLUTOSE) oral gel 1 Tube  15 g Oral PRN Jany Peres MD        glucagon [facility]) injection 1 mg  1 mg IntraMUSCular PRN Jany Peres MD        dextrose (D50W) injection syrg 12.5-25 g  25-50 mL IntraVENous PRN Jany Peres MD   25 g at 06/29/17 0246    vancomycin (VANCOCIN) 1,000 mg in 0.9% sodium chloride (MBP/ADV) 250 mL  1,000 mg IntraVENous Q24H Jose Orellana  mL/hr at 06/30/17 1319 1,000 mg at 06/30/17 1319    famotidine (PF) (PEPCID) 20 mg in sodium chloride 0.9 % 10 mL injection  20 mg IntraVENous Q24H Jill Thomason MD   20 mg at 60/69/12 4472    folic acid (FOLVITE) 1 mg, thiamine (B-1) 100 mg in 0.9% sodium chloride 50 mL ivpb   IntraVENous DAILY Jill Thomason MD        norepinephrine (LEVOPHED) 4 mg in dextrose 5% 250 mL infusion  2-16 mcg/min IntraVENous TITRATE Jill Thomason MD   Stopped at 06/30/17 0730    0.9% sodium chloride infusion 250 mL  250 mL IntraVENous PRN Tera Alvarez NP        haloperidol lactate (HALDOL) injection 2 mg  2 mg IntraMUSCular Q4H PRN Jill Thomason MD   2 mg at 06/30/17 2136    nicotine (NICODERM CQ) 14 mg/24 hr patch 1 Patch  1 Patch TransDERmal DAILY Jill Thomason MD   1 Patch at 17 0902    sodium chloride (NS) flush 5-10 mL  5-10 mL IntraVENous Patsy Hernandez MD   10 mL at 17 0529    sodium chloride (NS) flush 5-10 mL  5-10 mL IntraVENous PRN Brea Munoz MD        aspirin delayed-release tablet 81 mg  81 mg Oral DAILY Thien Downing MD   81 mg at 17 0901    HYDROcodone-acetaminophen (NORCO) 5-325 mg per tablet 1 Tab  1 Tab Oral Q4H PRN Thien Downing MD   1 Tab at 17 1631    lacosamide (VIMPAT) tablet 100 mg  100 mg Oral BID Thien Downing MD   100 mg at 17 2136    senna-docusate (Herb Fought) 8.6-50 mg per tablet 2 Tab  2 Tab Oral DAILY Thien Downing MD   2 Tab at 17 0901    ondansetron (ZOFRAN) injection 4 mg  4 mg IntraVENous Q4H PRN Thien Downing MD        insulin lispro (HUMALOG) injection   SubCUTAneous AC&HS Thien Downing MD   8 Units at 17 0748    piperacillin-tazobactam (ZOSYN) 4.5 g in 0.9% sodium chloride (MBP/ADV) 100 mL  4.5 g IntraVENous Q8H Lexi Shen MD 25 mL/hr at 17 0239 4.5 g at 17 0239         PHYSICAL EXAM     Visit Vitals    /55    Pulse 100    Temp 98.9 °F (37.2 °C)    Resp 22    Ht 3' 5\" (1.041 m)  Comment: bilateral AKA    Wt 48.9 kg (107 lb 12.9 oz)    SpO2 98%    BMI 45.09 kg/m2      Temp (24hrs), Av.5 °F (36.9 °C), Min:98.3 °F (36.8 °C), Max:98.9 °F (37.2 °C)    Oxygen Therapy  O2 Sat (%): 98 % (17 08)  Pulse via Oximetry: 101 beats per minute (17 08)  O2 Device: Room air (17 07)  O2 Flow Rate (L/min): 2 l/min (17 1950)  FIO2 (%): 28 % (17 08)    Intake/Output Summary (Last 24 hours) at 17 09  Last data filed at 17 0533   Gross per 24 hour   Intake             3070 ml   Output             7150 ml   Net            -4080 ml        Physical Exam:  General:         Awake,  Afebrile. Looks older than stated age. HEENT:               NCAT. No obvious deformity. Lungs:                  Crackles at the base b/l have improved. Cardiovascular:   RRR. No m/r/g. B/l BKA  Abdomen:       S/nt/nd. Bowel sounds normal. Wearing dipper. Skin:         No rashes or lesions. Not Jaundiced  Neurologic:    Awake. No gross focal deficit.   PSych:     Anxious                DIAGNOSTIC STUDIES      Data Review:   Recent Results (from the past 24 hour(s))   GLUCOSE, POC    Collection Time: 06/30/17 11:56 AM   Result Value Ref Range    Glucose (POC) 219 (H) 65 - 100 mg/dL   GLUCOSE, POC    Collection Time: 06/30/17  4:00 PM   Result Value Ref Range    Glucose (POC) 172 (H) 65 - 100 mg/dL   HGB & HCT    Collection Time: 06/30/17  5:30 PM   Result Value Ref Range    HGB 9.5 (L) 13.6 - 17.2 g/dL    HCT 28.1 (L) 41.1 - 50.3 %   GLUCOSE, POC    Collection Time: 06/30/17  7:26 PM   Result Value Ref Range    Glucose (POC) 250 (H) 65 - 100 mg/dL   GLUCOSE, POC    Collection Time: 06/30/17 10:24 PM   Result Value Ref Range    Glucose (POC) 324 (H) 65 - 104 mg/dL   METABOLIC PANEL, BASIC    Collection Time: 07/01/17  4:30 AM   Result Value Ref Range    Sodium 134 (L) 136 - 145 mmol/L    Potassium 4.0 3.5 - 5.1 mmol/L    Chloride 99 98 - 107 mmol/L    CO2 26 21 - 32 mmol/L    Anion gap 9 7 - 16 mmol/L    Glucose 241 (H) 65 - 100 mg/dL    BUN 31 (H) 8 - 23 MG/DL    Creatinine 0.80 0.8 - 1.5 MG/DL    GFR est AA >60 >60 ml/min/1.73m2    GFR est non-AA >60 >60 ml/min/1.73m2    Calcium 8.0 (L) 8.3 - 10.4 MG/DL   HGB & HCT    Collection Time: 07/01/17  4:30 AM   Result Value Ref Range    HGB 9.0 (L) 13.6 - 17.2 g/dL    HCT 26.4 (L) 41.1 - 50.3 %   MAGNESIUM    Collection Time: 07/01/17  4:30 AM   Result Value Ref Range    Magnesium 2.0 1.8 - 2.4 mg/dL   GLUCOSE, POC    Collection Time: 07/01/17  7:48 AM   Result Value Ref Range    Glucose (POC) 345 (H) 65 - 100 mg/dL       All Micro Results     Procedure Component Value Units Date/Time    CULTURE, URINE [[id number]]  (Abnormal) Collected:  06/28/17 2620 Order Status:  Completed Specimen:  Urine from Cath Urine Updated:  07/01/17 0820     Special Requests: NO SPECIAL REQUESTS        Culture result: >100,000  COLONIES/mL  YEAST   (A)       IDENTIFICATION TO FOLLOW       CULTURE, BLOOD [[id number]] Collected:  06/27/17 0938    Order Status:  Completed Specimen:  Whole Blood from Blood Updated:  07/01/17 0720     Special Requests: RIGHT FOREARM        Culture result: NO GROWTH 4 DAYS       CULTURE, BLOOD [[id number]] Collected:  06/27/17 0932    Order Status:  Completed Specimen:  Whole Blood from Blood Updated:  07/01/17 0720     Special Requests: RIGHT ANTECUBITAL        Culture result: NO GROWTH 4 DAYS       MRSA SCREEN - PCR (NASAL) [[id number]] Collected:  06/28/17 0655    Order Status:  Completed Specimen:  Nasal from Nasal Swab Updated:  06/28/17 1032     Special Requests: NO SPECIAL REQUESTS        Culture result:         MRSA target DNA is not detected (presumptive not colonized with MRSA)          Imaging Neha Can /Studies:    CXR Results  (Last 48 hours)               06/27/17 0833  XR CHEST PA LAT Final result    Impression:  IMPRESSION: Suspicious for right pneumonia, in the differential is asymmetric   pulmonary edema. Correlate clinically. Narrative:  2 View Chest X-Ray 6/27/2017 8:33 AM       INDICATION: Poorly controlled diabetes, cough       COMPARISON: 12/27/2014       FINDINGS: Upright AP and Lateral views are submitted. The cardiac and   mediastinal contours are normal. Lungs are adequately inflated and the left lung   clear. In the right lung there are clearly increased hazy and reticular lung   densities. There is slight pleural thickening laterally. No pneumothorax. Vascularity seems appropriate. CT Results  (Last 48 hours)               06/27/17 1048  CT CHEST WO CONT Final result    Impression:  IMPRESSION:       1. Bilateral pleural effusions and right lower lobe consolidation.        2. Calcified pancreatic head mass. This appearance may be secondary to chronic   pancreatitis. Correlate with medical history and if indicated with a pancreatic   protocol abdominal CT. Narrative:  CT CHEST WITHOUT CONTRAST 6/27/2017       HISTORY: Mild hypoxia in the ER. Right-sided densities on chest radiograph. TECHNIQUE: Noncontrast axial images were obtained through the chest.       COMPARISON: PA lateral chest x-ray from the same day       FINDINGS: Bilateral pleural effusions are present. Consolidation is present in   the right lower lobe. Paraseptal emphysematous changes are present in the upper   lobes. There is smooth thickening of the interstitial markings in the lung   apices. This finding may be present with interstitial edema. The central airways   are patent. There is a calcified granuloma in the periphery of the right upper   lobe. A few calcified right hilar lymph nodes are present, suggesting prior   granulomatous inflammation. Coronary artery atherosclerotic calcifications are   present. Included images of the upper abdomen demonstrate normal-appearing adrenal glands   and stones within the gallbladder. Multiple pancreatic head calcifications are   present. This finding may be present with chronic pancreatitis. This is similar   in appearance to CTA June 6, 2013. Labs and Studies from previous 24 hours have been personally reviewed by myself        TTE:    SUMMARY:    -  Left ventricle: The ventricle was mildly dilated. Systolic function was  severely reduced. Ejection fraction was estimated in the range of 30 % to 35   %. There was severe diffuse hypokinesis. -  Right ventricle: The ventricle was mildly dilated. Systolic function was  reduced. -  Left atrium: The atrium was mildly dilated. -  Right atrium: The atrium was mildly dilated. -  Inferior vena cava, hepatic veins: The inferior vena cava was dilated.  The  respirophasic change in diameter was less than 50%.    -  Mitral valve: There was moderate regurgitation. -  Tricuspid valve: There was mild to moderate regurgitation. ASSESSMENT      Active Hospital Problems    Diagnosis Date Noted    Elevated brain natriuretic peptide (BNP) level 06/28/2017    EVANGELINA (acute kidney injury) ([facility] 06/28/2017    Hyperkalemia 06/28/2017    Acute metabolic encephalopathy 03/51/0834    Elevated liver enzymes 06/28/2017    Anemia 06/28/2017    Hypoxemia 06/28/2017    Pleural effusion, bilateral 06/28/2017    Pulmonary infiltrates 06/28/2017    Hypoglycemia 06/27/2017    Underweight 12/30/2014    Malnutrition ([address] 12/30/2014    S/P AKA (above knee amputation) bilateral ([facility] 12/05/2014     Due to PVD, diabetes etc      Acute renal failure ([address] 12/05/2014    Hypotension 12/05/2014    Sepsis ([facility] 02/26/2014    DM (diabetes mellitus) type II uncontrolled, periph vascular disorder ([facility] 11/14/2012    Medical non-compliance 06/25/2012    CAD (coronary artery disease) 06/25/2012    ETOH abuse 06/10/2011     12 pk of beer/ day for \"years\" until a month and have ago      Tobacco abuse 10/05/2009     QUIT          Plan:    - Acute Metabolic Encephalopathy: unclear etiology - resolved <10 min. Could be 2/2 Hypoxia, Low BP, cardiac event, ? seizure. Monitor on tele. Consider CT head vs MRI brain. Continue Vimpat/Keppra - decrease keppra based on levels. - hx of noncomplaince  - Shock likely Septic (PNA/UTI) vs Cardiogenic vs Hypovolemia/Anemia - on Zosyn/vanc, follow Cultures, Off pressors, monitor closely, WBCs trending down. - Acute Hypoxic Resp failure: LLL PNA, on Zosyn/Vanc.  - Severe Systolic CHF with EF 47%: Appreciate cards, started on low dose BB/ACEIs. Ischemic workup per cards. Diuresing on Lasix. - Anemia likely 2/2 GIBleed: +ve Stool occult blood, monitor HH, US abd unremrkable. GI consulted and pt refused EGD.  - Hyperkalemia - Resolved  - ETOH Abuse:  On thiamine/folate   - Hyperglycemia: Will resume Ruth qaoliverio, Continue ISS  - Hematuria with Blood Clots: CBI started, Urology to see pt. DVT Prophylaxis: SCDs  CODE Status: Full CODE  Plan of Care Discussed with: patient. Care team. Family contacts below:    Home Work [address] [phone]  [address] [place] [address] Spouse [phone]       Medical Risk: high due to Shock, Sepsis, Severe CHF. Disposition: Transfer to tele floor later today.      Mohan Montgomery MD  07/01/17

## 2017-07-01 NOTE — PROGRESS NOTES
Spoke with Dr. Aman Del Rio with urology, informed that urology does not need to see patient until tomorrow AM per Dr. David Leo unless unable to insert catheter. Instructed to attempt to place [address] craven with CBI.

## 2017-07-01 NOTE — PROGRESS NOTES
Bedside shift change report given to Jennifer Mercado RN (oncoming nurse) by Jocelyn Hines RN (offgoing nurse). Report included the following information SBAR, Kardex, Intake/Output, MAR, Recent Results and Cardiac Rhythm NSR.

## 2017-07-01 NOTE — PROGRESS NOTES
Reviewed notes prior to visit. Noted patient had birthday [birth year]. Full code with no directives on file. [facility] have followed patient since 2013. Spouse- Sravanthitiago DoanBerta. Son- Huang Walden.   Signed by chaplain Mable

## 2017-07-01 NOTE — PROGRESS NOTES
Pharmacokinetic Consult to Pharmacist    Benjy Parveen is a 58 y.o. male being treated for pneumonia with vancomycin and pip/tazo. Of note, the patient is s/p bilateral AKA and presented from a SNF where he was undergoing rehab. Height: 3' 5\" (104.1 cm) (bilateral AKA)  Weight: 48.9 kg (107 lb 12.9 oz)  Lab Results   Component Value Date/Time    BUN 31 07/01/2017 04:30 AM    Creatinine 0.80 07/01/2017 04:30 AM    WBC 11.4 06/29/2017 03:55 AM    Procalcitonin 0.3 12/05/2014 01:20 PM    Lactic acid 1.6 06/28/2017 10:30 AM    Lactic acid 1.4 12/27/2014 11:45 AM      Estimated Creatinine Clearance: 46.9 mL/min (based on Cr of 0.8). CULTURES:  6/27  Blood X 2  NGTD, pending    MRSA screen  Negative  6/28  Urine   Yeast, pending    Lab Results   Component Value Date/Time    Vancomycin,trough 10.9 07/01/2017 12:00 PM       Day 4 of vancomycin. Goal trough is 15-20. Level is low. Increase from 1000 mg IV q24h to 1500 mg IV q24h. Further levels will be ordered as clinically indicated. Pharmacy will continue to follow. Please call with any questions.     Thank you,  Ja Mei, PharmD  Clinical Pharmacist  [phone]

## 2017-07-01 NOTE — PROGRESS NOTES
Critical Care Daily Progress Note: 7/1/2017    [facility]   Admission Date: 6/27/2017         The patient's chart is reviewed and the patient is discussed with the staff. 57 yo male with a history of B AKA, PAD, CAD, DM2, chronic pancreatitis, recent perianal abscess, small perianal fistula, debility, chronic pain, and tobacco abuse. Pt presented with hypoglycemia (BS in the 30s) from SNF. He developed hypotension and was transferred to CCU. He was also hypoxic, CT chest with pleural effusions, BNP 1125. Central line was placed and he was started on pressors. Troponin elevated at 1.15 Cardiology consulted and felt rise likely secondary to supply/demand ischemia. TTE with EF 30-35% / severe diffuse hypokinesis         Subjective:     Slightly confused overnight per nursing and pulled out his craven and urine bloody with clots - now on continuous bladder irrigation with clear urine. Currently on O2 at 2 lpm with o2 sat 98%. Denies cough, mucus production, or chest pain.      Current Facility-Administered Medications   Medication Dose Route Frequency    Vancomycin trough reminder   Other ONCE    insulin glargine (LANTUS) injection 20 Units  20 Units SubCUTAneous DAILY    [START ON 7/2/2017] fluconazole (DIFLUCAN) tablet 200 mg  200 mg Oral DAILY    lisinopril (PRINIVIL, ZESTRIL) tablet 10 mg  10 mg Oral DAILY    carvedilol (COREG) tablet 6.25 mg  6.25 mg Oral BID WITH MEALS    spironolactone (ALDACTONE) tablet 25 mg  25 mg Oral DAILY    furosemide (LASIX) injection 40 mg  40 mg IntraVENous BID    levETIRAcetam (KEPPRA) tablet 250 mg  250 mg Oral BID    QUEtiapine (SEROquel) tablet 25-50 mg  25-50 mg Oral QHS PRN    dextrose 40% (GLUTOSE) oral gel 1 Tube  15 g Oral PRN    glucagon (GLUCAGEN) injection 1 mg  1 mg IntraMUSCular PRN    dextrose (D50W) injection syrg 12.5-25 g  25-50 mL IntraVENous PRN    vancomycin (VANCOCIN) 1,000 mg in 0.9% sodium chloride (MBP/ADV) 250 mL  1,000 mg IntraVENous Q24H    famotidine (PF) (PEPCID) 20 mg in sodium chloride 0.9 % 10 mL injection  20 mg IntraVENous S33X    folic acid (FOLVITE) 1 mg, thiamine (B-1) 100 mg in 0.9% sodium chloride 50 mL ivpb   IntraVENous DAILY    norepinephrine (LEVOPHED) 4 mg in dextrose 5% 250 mL infusion  2-16 mcg/min IntraVENous TITRATE    0.9% sodium chloride infusion 250 mL  250 mL IntraVENous PRN    haloperidol lactate (HALDOL) injection 2 mg  2 mg IntraMUSCular Q4H PRN    nicotine (NICODERM CQ) 14 mg/24 hr patch 1 Patch  1 Patch TransDERmal DAILY    sodium chloride (NS) flush 5-10 mL  5-10 mL IntraVENous Q8H    sodium chloride (NS) flush 5-10 mL  5-10 mL IntraVENous PRN    aspirin delayed-release tablet 81 mg  81 mg Oral DAILY    HYDROcodone-acetaminophen (NORCO) 5-325 mg per tablet 1 Tab  1 Tab Oral Q4H PRN    lacosamide (VIMPAT) tablet 100 mg  100 mg Oral BID    senna-docusate (PERICOLACE) 8.6-50 mg per tablet 2 Tab  2 Tab Oral DAILY    ondansetron (ZOFRAN) injection 4 mg  4 mg IntraVENous Q4H PRN    insulin lispro (HUMALOG) injection   SubCUTAneous AC&HS    piperacillin-tazobactam (ZOSYN) 4.5 g in 0.9% sodium chloride (MBP/ADV) 100 mL  4.5 g IntraVENous Q8H       Review of Systems  Constitutional:  negative for fever, chills, sweats  Cardiovascular:  negative for chest pain, palpitations, syncope, edema  Gastrointestinal:  negative for dysphagia, reflux, vomiting, diarrhea, abdominal pain, or melena  Neurologic:  negative for focal weakness, numbness, headache      Objective:     Vitals:    07/01/17 0732 07/01/17 0801 07/01/17 0831 07/01/17 0931   BP: 131/62 114/55 103/53 110/56   Pulse: (!) 102 100 100 98   Resp: 30 22 22 25   Temp: 98.9 °F (37.2 °C)      SpO2: 98% 98% 95% 98%   Weight:       Height:           Intake and Output:   06/29 1901 - 07/01 0700  In: 3639.5 [P.O.:1080; I.V.:559.5]  Out: 43214 [Urine:46111]  07/01 0701 - 07/01 1900  In: 240 [P.O.:240]  Out: -     Physical Exam:          Constitutional: the patient is well developed and in no acute distress  EENMT:  Sclera clear, pupils equal, oral mucosa moist  Respiratory: scattered crackles, O2 at 2 lpm  Cardiovascular:  RRR without M,G,R  Gastrointestinal: soft and non-tender; with positive bowel sounds. Musculoskeletal: warm without cyanosis. B AKA  Skin:  no jaundice or rashes, no open wounds   Neurologic: no gross neuro deficits     Psychiatric:  alert and oriented x 3    LINES:  L central line / craven with bladder irrigation    DRIPS:   None    CXR:   6/30 6/28 TTE  LEFT VENTRICLE: The ventricle was mildly dilated. Systolic function was severely reduced. Ejection fraction was estimated in the range of 30 % to 35 %. There was severe diffuse hypokinesis. Wall thickness was normal. The study   Was not technically sufficient to allow evaluation of LV diastolic function. RIGHT VENTRICLE: The ventricle was mildly dilated. Systolic function was reduced. Estimated peak pressure was in the range of 30-35 mmHg. LEFT ATRIUM: The atrium was mildly dilated. RIGHT ATRIUM: The atrium was mildly dilated. SYSTEMIC VEINS: IVC: The inferior vena cava was dilated. The respirophasic change in diameter was less than 50%. AORTIC VALVE: The valve was probably trileaflet. Leaflets exhibited mild sclerosis. There was no evidence for stenosis. There was no insufficiency. MITRAL VALVE: There was mild diffuse thickening. There was  no evidence for stenosis. There was moderate regurgitation. TRICUSPID VALVE: There was no evidence for stenosis. There was mild to moderate regurgitation. PULMONIC VALVE: Not well visualized. There was no evidence for stenosis. There was no insufficiency. PERICARDIUM: There was no pericardial effusion. AORTA: The root exhibited normal size.     LAB  Recent Labs      07/01/17   0748  06/30/17   2224  06/30/17   1926  06/30/17   1600  06/30/17   1156   GLUCPOC  345*  324*  250*  172*  219*      Recent Labs      07/01/17   0430  06/30/17 1730  06/30/17 0445 06/29/17 0355 06/28/17   1415   WBC   --    --    --    --   11.4*   --    --    HGB  9.0*  9.5*  9.3*   < >  8.7*   < >  5.7*   HCT  26.4*  28.1*  27.2*   < >  25.3*   < >  17.1*   PLT   --    --    --    --   223   --    --    INR   --    --    --    --    --    --   1.0    < > = values in this interval not displayed. Recent Labs      07/01/17   0430  06/30/17 0445 06/29/17 0355 06/28/17   2330   06/28/17   1840  06/28/17   1415   NA  134*  136  136   --    --    --   133*   K  4.0  3.6  3.7   --    < >   --   5.4*   CL  99  100  103   --    --    --   102   CO2  26  26  22   --    --    --   21   GLU  241*  132*  76   --    --    --   38*   BUN  31*  37*  45*   --    --    --   56*   CREA  0.80  0.96  1.01   --    --    --   1.19   MG  2.0  2.3   --    --    --    --    --    CA  8.0*  7.9*  7.8*   --    --    --   7.7*   TROIQ   --    --    --   1.01*   --   0.96*   --    ALB   --    --    --    --    --    --   2.0*   TBILI   --    --    --    --    --    --   0.3   ALT   --    --    --    --    --    --   110*   SGOT   --    --    --    --    --    --   79*    < > = values in this interval not displayed. No results for input(s): PH, PCO2, PO2, HCO3 in the last 72 hours. No results for input(s): LCAD, LAC in the last 72 hours.     Assessment:  (Medical Decision Making)     Hospital Problems  Date Reviewed: 6/29/2017          Codes Class Noted POA    Chronic systolic congestive heart failure (HCC) ICD-10-CM: I50.22  ICD-9-CM: 428.22, 428.0  6/28/2017 Yes    EF 30-35%  meds adjusted by Cardiology  excellen diuresis - 7+ liters yesterday      EVANGELINA (acute kidney injury) ([facility] [address] ICD-10-CM: N17.9  ICD-9-CM: 584.9  6/28/2017 Yes    Creat 0.8      Hyperkalemia ICD-10-CM: E87.5  ICD-9-CM: 276.7  6/28/2017 No    K+ 4.0      Acute metabolic encephalopathy [id number]: G93.41  ICD-9-CM: 348.31  6/28/2017 Yes        Elevated liver enzymes ICD-10-CM: R74.8  ICD-9-CM: 790.5  6/28/2017 Yes Anemia ICD-10-CM: D64.9  ICD-9-CM: 285.9  6/28/2017 Yes    Hgb 5.7 >>>9.0      Hypoxemia ICD-10-CM: R09.02  ICD-9-CM: 799.02  6/28/2017 Yes        Pleural effusion, bilateral ICD-10-CM: J90  ICD-9-CM: 511.9  6/28/2017 Yes        Pulmonary infiltrates ICD-10-CM: R91.8  ICD-9-CM: 793.19  6/28/2017 Yes    Likely secondary to Volume overload  Improving with diuresis      * (Principal)Hypoglycemia ICD-10-CM: E16.2  ICD-9-CM: 251.2  6/27/2017 Yes    BS in the 30s on admission      Malnutrition ([facility] (Chronic) ICD-10-CM: E46  ICD-9-CM: 263.9  12/30/2014 Yes        Underweight (Chronic) ICD-10-CM: R63.6  ICD-9-CM: 783.22  12/30/2014 Yes        Acute renal failure (HCC) ICD-10-CM: N17.9  ICD-9-CM: 584.9  12/5/2014 Yes        Hypotension ICD-10-CM: I95.9  ICD-9-CM: 458.9  12/5/2014 Yes    Resolved  No longer requiring pressors      S/P AKA (above knee amputation) bilateral ([facility] (Chronic) ICD-10-CM: B87.679, B35.067  ICD-9-CM: V49.76  12/5/2014 Yes     Due to PVD, diabetes etc         Sepsis ([facility] (Chronic) ICD-10-CM: A41.9  ICD-9-CM: 038.9, 995.91  2/26/2014 Yes    On abx      DM (diabetes mellitus) type II uncontrolled, periph vascular disorder (HCC) (Chronic) ICD-10-CM: E11.51, E11.65  ICD-9-CM: 250.72, 443.81  11/14/2012 Yes    BS range 100-345  lantus / SSI  6/17/17 Hgb A1c 11.8      CAD (coronary artery disease) (Chronic) ICD-10-CM: I25.10  ICD-9-CM: 414.00  6/25/2012 Yes        Medical non-compliance (Chronic) ICD-10-CM: Z91.19  ICD-9-CM: V15.81  6/25/2012 Yes        ETOH abuse (Chronic) ICD-10-CM: F10.10  ICD-9-CM: 305.00  6/10/2011 Yes     12 pk of beer/ day for \"years\" until a month and have ago         Tobacco abuse (Chronic) ICD-10-CM: Z72.0  ICD-9-CM: 305.1  10/5/2009 Yes     QUIT             Plan:  (Medical Decision Making)     --diflucan / Zoxyn / Vanc d5  --lasix 40 mg IV BID / aldactone   BNP 6/29: 2285    7 liters urine output yesterday  --wean O2 as tolerated  --transferring to floor today - no further pulmonary/critical care needs identified at this time. Will sign off. Please call if we can be of further assistance. More than 50% of the time documented was spent in face-to-face contact with the patient and in the care of the patient on the floor/unit where the patient is located. Sara Walls NP    The patient has been seen and examined by me personally, the chart,labs, and radiographic studies have been reviewed. Chest: CTA  Extremities: AKA bilaterally    I agree with the above assessment and plan.     Alyssa Mcneil MD.

## 2017-07-01 NOTE — PROGRESS NOTES
3 way craven catheter inserted and CBI started at this time. Patient urine is yellow with blood clots. Patient tolerated well.

## 2017-07-01 NOTE — PROGRESS NOTES
Pt arrieved to the floor alert and oriented x 3 resp even and unlabored, pt voices no complains of any at the present time.  irrigated going, no clots present. Pt has wound on perianal area, however no other areas are up. Will continue to monitor.

## 2017-07-01 NOTE — PROGRESS NOTES
Patient voided 100 ml bright red urine with large clots after pulling craven catheter out. Bladder scanner revealed 237 ml urine post void. Dr. Ham Bejarano notified, orders to speak with urology before placing a craven and starting CBI. Urologist on call paged.

## 2017-07-01 NOTE — PROGRESS NOTES
TRANSFER - IN REPORT:    Verbal report received from Bud(name) on [employer]  being received from ICU(unit) for routine progression of care      Report consisted of patients Situation, Background, Assessment and   Recommendations(SBAR). Information from the following report(s) Kardex was reviewed with the receiving nurse. Opportunity for questions and clarification was provided. Assessment completed upon patients arrival to unit and care assumed.

## 2017-07-01 NOTE — PROGRESS NOTES
Date of Outreach Update:  Brendon Moran was seen and assessed. MEWS Score: 1 (07/01/17 1557)  Vitals:    07/01/17 1131 07/01/17 1308 07/01/17 1557 07/01/17 1717   BP: 103/59 118/67 92/52 106/58   Pulse: 86 86 87 77   Resp: 12 16 14    Temp:  99.4 °F (37.4 °C) 100.1 °F (37.8 °C)    SpO2: 94% 95% 96%    Weight:       Height:             Pain Assessment  Pain Intensity 1: 0 (07/01/17 1541)  Pain Location 1: Hand  Pain Intervention(s) 1: Medication (see MAR)  Patient Stated Pain Goal: 0      Previous Outreach assessment has been reviewed. There have been no significant clinical changes since the completion of the last dated Outreach assessment. Patient more alert and interactive. Will continue to follow up per outreach protocol.     Signed By:   Veena Edge RN    July 1, 2017 5:22 PM

## 2017-07-01 NOTE — PROGRESS NOTES
[facility] CRITICAL CARE OUTREACH NURSE PROGRESS REPORT      SUBJECTIVE: Called to assess patient secondary to transfer from CCU. MEWS Score: 1 (07/01/17 1308)  Vitals:    07/01/17 1030 07/01/17 1101 07/01/17 1131 07/01/17 1308   BP: 133/61 116/59 103/59 118/67   Pulse: 93 91 86 86   Resp: 28 30 12 16   Temp:    99.4 °F (37.4 °C)   SpO2: 99% 97% 94% 95%   Weight:       Height:          EKG: there are no previous tracings available for comparison. LAB DATA:    Recent Labs      07/01/17   0430  06/30/17   0445  06/29/17   0355   NA  134*  136  136   K  4.0  3.6  3.7   CL  99  100  103   CO2  26  26  22   AGAP  9  10  11   GLU  241*  132*  76   BUN  31*  37*  45*   CREA  0.80  0.96  1.01   GFRAA  >60  >60  >60   GFRNA  >60  >60  >60   CA  8.0*  7.9*  7.8*   MG  2.0  2.3   --         Recent Labs      07/01/17   0430  06/30/17   1730  06/30/17   0445   06/29/17   0355   WBC   --    --    --    --   11.4*   HGB  9.0*  9.5*  9.3*   < >  8.7*   HCT  26.4*  28.1*  27.2*   < >  25.3*   PLT   --    --    --    --   223    < > = values in this interval not displayed. OBJECTIVE: On arrival to room, I found patient to be in bed, sleeping. Visit Vitals    /67    Pulse 86    Temp 99.4 °F (37.4 °C)    Resp 16    Ht 3' 5\" (1.041 m)  Comment: bilateral AKA    Wt 48.9 kg (107 lb 12.9 oz)    SpO2 95%    BMI 45.09 kg/m2     General appearance: no distress, drowsy  Lungs: clear to auscultation bilaterally       Pain Assessment  Pain Intensity 1: 0 (07/01/17 1308)  Pain Location 1: Hand  Pain Intervention(s) 1: Medication (see MAR)  Patient Stated Pain Goal: 0                                 ASSESSMENT:  Patient on room air, lung sounds CTA, sats 93%. Patient awakens by voice, follows commands. CBI running, urine clear and yellow no blood clots noted.      PLAN:  Will continue to follow patient per outreach program.

## 2017-07-02 NOTE — ED NOTES
Attempted to call report to [facility] x3 without success. Patient discharged with [address] ambulance service to [facility] via stretcher.

## 2017-07-02 NOTE — PROGRESS NOTES
Gave discharge orders to pt, pt voiced understanding. Pt voices no other questions. Lines removed and dressed with guaze all sites dry and intact. Will continue to monitor until patient transported to discharge .

## 2017-07-02 NOTE — DISCHARGE INSTRUCTIONS
Learning About Urinary Catheter Care to Prevent Infection  What is a urinary catheter? A urinary catheter is a flexible plastic tube used to drain urine from your bladder when you can't urinate on your own. The catheter allows urine to drain from the bladder into a bag. Two types of drainage bags may be used with a urinary catheter. · A bedside bag is a large bag that you can hang on the side of your bed or on a chair. You can use it overnight or anytime you will be sitting or lying down for a long time. · A leg bag is a small bag that you can use during the day. It is usually attached to your thigh or calf and hidden under your clothes. Having a urinary catheter increases your risk of getting a urinary tract infection. Germs may get on the catheter and cause an infection in your bladder or kidneys. The longer you have a catheter, the more likely it is that you will get an infection. You can help prevent this problem with good hygiene and careful handling of your catheter and drainage bags. How can you help prevent infection? Take care to be clean  · Always wash your hands well before and after you handle your catheter. · Clean the skin around the catheter twice a day using soap and water. Dry with a clean towel afterward. You can shower with your catheter and drainage bag in place unless your doctor told you not to. · When you clean around the catheter, check the surrounding skin for signs of infection. Look for things like pus or irritated, swollen, red, or tender skin around the catheter. Be careful with your drainage bag  · Always keep the drainage bag below the level of your bladder. This will help keep urine from flowing back into your bladder. · Check often to see that urine is flowing through the catheter into the drainage bag. · Empty the drainage bag when it is half full. This will keep it from overflowing or backing up.   · When you empty the drainage bag, do not let the tubing or drain spout touch anything. Be careful with your catheter  · Do not unhook the catheter from the drain tube. That could let germs get into the tube. · Make sure that the catheter tubing does not get twisted or kinked. · Do not tug or pull on the catheter. And make sure that the drainage bag does not drag or pull on the catheter. · Do not put powder or lotion on the skin around the catheter. · Talk with your doctor about your options for sexual intercourse while wearing a catheter. How do you empty a urine drainage bag? If your doctor has asked you to keep a record, write down the amount of urine in the bag before you empty it. Wash your hands before and after you touch the bag. 1. Remove the drain spout from its sleeve at the bottom of the drainage bag.  2. Open the valve on the drain spout. Let the urine flow out into the toilet or a container. Be careful not to let the tubing or drain spout touch anything. 3. After you empty the bag, wipe off any liquid on the end of the drain spout. Close the valve. Then put the drain spout back into its sleeve at the bottom of the collection bag. How do you add a bedside bag to a leg bag? Wash your hands before and after you handle the bags. 1. Empty the leg bag attached to the catheter. 2. Put a clean towel under the leg bag.  3. Use an alcohol wipe to clean the tip of the bedside bag. Then connect the bedside bag to the leg bag. How can you clean a bedside drainage bag? Many people clean their bedside bag in the morning if they switch to a leg bag. To clean a bedside drainage ba. Remove the bedside bag from the leg bag.  2. Fill the bag with 2 parts vinegar and 3 parts water. Let it stand for 20 minutes. 3. Empty the bag, and let it air dry. When should you call for help? Call your doctor now or seek immediate medical care if:  · You have symptoms of a urinary infection. These may include:  ¨ Pain or burning when you urinate.   ¨ A frequent need to urinate without being able to pass much urine. ¨ Pain in the flank, which is just below the rib cage and above the waist on either side of the back. ¨ Blood in your urine. ¨ A fever. · Your urine smells bad. · You see large blood clots in your urine. · No urine or very little urine is flowing into the bag for 4 or more hours. Watch closely for changes in your health, and be sure to contact your doctor if:  · The area around the catheter becomes irritated, swollen, red, or tender, or there is pus draining from it. · Urine is leaking from the place where the catheter enters your body. Follow-up care is a key part of your treatment and safety. Be sure to make and go to all appointments, and call your doctor if you are having problems. It's also a good idea to know your test results and keep a list of the medicines you take. Where can you learn more? Go to http://mario-roma.info/. Enter C910 in the search box to learn more about \"Learning About Urinary Catheter Care to Prevent Infection. \"  Current as of: April 13, 2017  Content Version: 11.3  © 7251-7257 [employer]. Care instructions adapted under license by [employer] (which disclaims liability or warranty for this information). If you have questions about a medical condition or this instruction, always ask your healthcare professional. [address] any warranty or liability for your use of this information. Diabetes Blood Sugar Emergencies: Your Action Plan  How can you prevent a blood sugar emergency? An important part of living with diabetes is keeping your blood sugar in your target range. You'll need to know what to do if it's too high or too low. Managing your blood sugar levels helps you avoid emergencies. This care sheet will teach you about the signs of high and low blood sugar. It will help you make an action plan with your doctor for when these signs occur.   Low blood sugar is more likely to happen if you take certain medicines for diabetes. It can also happen if you skip a meal, drink alcohol, or exercise more than usual.  You may get high blood sugar if you eat differently than you normally do. One example is eating more carbohydrate than usual. Having a cold, the flu, or other sudden illness can also cause high blood sugar levels. Levels can also rise if you miss a dose of medicine. Any change in how you take your medicine may affect your blood sugar level. So it's important to work with your doctor before you make any changes. Check your blood sugar  Work with your doctor to fill in the blank spaces below that apply to you. Track your levels, know your target range, and write down ways you can get your blood sugar back in your target range. A log book can help you track your levels. Take the book to all of your medical appointments. · Check your blood sugar _____ times a day, at these times:________________________________________________. (For example: Before meals, at bedtime, before exercise, during exercise, other.)  · Your blood sugar target range before a meal is ___________________. Your blood sugar target range after a meal is _______________________. · Do this--___________________________________________________--to get your blood sugar back within your safe range if your blood sugar results are _________________________________________. (For example: Less than 70 or above 250 mg/dL.)  Call your doctor when your blood sugar results are ___________________________________. (For example: Less than 70 or above 250 mg/dL.)  What are the symptoms of low and high blood sugar? Common symptoms of low blood sugar are sweating and feeling shaky, weak, hungry, or confused. Symptoms can start quickly. Common symptoms of high blood sugar are feeling very thirsty or very hungry.  You may also pass urine more often than usual. You may have blurry vision and may lose weight without trying. But some people may have high or low blood sugar without having any symptoms. That's a good reason to check your blood sugar on a regular schedule. What should you do if you have symptoms? Work with your doctor to fill in the blank spaces below that apply to you. Low blood sugar  If you have symptoms of low blood sugar, check your blood sugar. If it's below _____ (for example, below 70), eat or drink a quick-sugar food that has about 15 grams of carbohydrate. Your goal is to get your level back to your safe range. Check your blood sugar again 15 minutes later. If it's still not in your target range, take another 15 grams of carbohydrate and check your blood sugar again in 15 minutes. Repeat this until you reach your target. Then go back to your regular testing schedule. When you have low blood sugar, it's best to stop or reduce any physical activity until your blood sugar is back in your target range and is stable. If you must stay active, eat or drink 30 grams of carbohydrate. Then check your blood sugar again in 15 minutes. If it's not in your target range, take another 30 grams of carbohydrates. Check your blood sugar again in 15 minutes. Keep doing this until you reach your target. You can then go back to your regular testing schedule. If your symptoms or blood sugar levels are getting worse or have not improved after 15 minutes, seek medical care right away. Here are some examples of quick-sugar foods with 15 grams of carbohydrate:  · 3 or 4 glucose tablets  · 1 tube of glucose gel  · Hard candy (such as 3 Jolly Ranchers or 5 to 7 Life Savers)  · ½ cup to ¾ cup (4 to 6 ounces) of fruit juice or regular (not diet) soda  High blood sugar  If you have symptoms of high blood sugar, check your blood sugar. Your goal is to get your level back to your target range. If it's above ______ (for example, above 250), follow these steps:  · If you missed a dose of your diabetes medicine, take it now.  Take only the amount of medicine that you have been prescribed. Do not take more or less medicine. · Give yourself insulin if your doctor has prescribed it for high blood sugar. · Test for ketones, if the doctor told you to do so. If the results of the ketone test show a moderate-to-large amount of ketones, call the doctor for advice. · Wait 30 minutes after you take the extra insulin or the missed medicine. Check your blood sugar again. If your symptoms or blood sugar levels are getting worse or have not improved after taking these steps, seek medical care right away. Follow-up care is a key part of your treatment and safety. Be sure to make and go to all appointments, and call your doctor if you are having problems. It's also a good idea to know your test results and keep a list of the medicines you take. Where can you learn more? Go to http://mario-roma.info/. Enter D010 in the search box to learn more about \"Diabetes Blood Sugar Emergencies: Your Action Plan. \"  Current as of: March 13, 2017  Content Version: 11.3  © 7200-1349 [email or web address]. Care instructions adapted under license by [employer] (which disclaims liability or warranty for this information). If you have questions about a medical condition or this instruction, always ask your healthcare professional. [address] any warranty or liability for your use of this information.         DISCHARGE SUMMARY from Nurse    The following personal items are in your possession at time of discharge:    Dental Appliances: None  Visual Aid: None     Home Medications: None  Jewelry: None  Clothing: Shirt, Pants, Undergarments, Footwear  Other Valuables: None             PATIENT INSTRUCTIONS:    After general anesthesia or intravenous sedation, for 24 hours or while taking prescription Narcotics:  · Limit your activities  · Do not drive and operate hazardous machinery  · Do not make important personal or business decisions  · Do  not drink alcoholic beverages  · If you have not urinated within 8 hours after discharge, please contact your surgeon on call. Report the following to your surgeon:  · Excessive pain, swelling, redness or odor of or around the surgical area  · Temperature over 100.5  · Nausea and vomiting lasting longer than 4 hours or if unable to take medications  · Any signs of decreased circulation or nerve impairment to extremity: change in color, persistent  numbness, tingling, coldness or increase pain  · Any questions        What to do at Home:  Recommended activity: Activity as tolerated,     If you experience any of the following symptoms fever 100.5, pain unrelieved by medication, increase in shortness of breath, please follow up with primary care doctor. *  Please give a list of your current medications to your Primary Care Provider. *  Please update this list whenever your medications are discontinued, doses are      changed, or new medications (including over-the-counter products) are added. *  Please carry medication information at all times in case of emergency situations. These are general instructions for a healthy lifestyle:    No smoking/ No tobacco products/ Avoid exposure to second hand smoke    Surgeon General's Warning:  Quitting smoking now greatly reduces serious risk to your health. Obesity, smoking, and sedentary lifestyle greatly increases your risk for illness    A healthy diet, regular physical exercise & weight monitoring are important for maintaining a healthy lifestyle    You may be retaining fluid if you have a history of heart failure or if you experience any of the following symptoms:  Weight gain of 3 pounds or more overnight or 5 pounds in a week, increased swelling in our hands or feet or shortness of breath while lying flat in bed.   Please call your doctor as soon as you notice any of these symptoms; do not wait until your next office visit. Recognize signs and symptoms of STROKE:    F-face looks uneven    A-arms unable to move or move unevenly    S-speech slurred or non-existent    T-time-call 911 as soon as signs and symptoms begin-DO NOT go       Back to bed or wait to see if you get better-TIME IS BRAIN. Warning Signs of HEART ATTACK     Call 911 if you have these symptoms:   Chest discomfort. Most heart attacks involve discomfort in the center of the chest that lasts more than a few minutes, or that goes away and comes back. It can feel like uncomfortable pressure, squeezing, fullness, or pain.  Discomfort in other areas of the upper body. Symptoms can include pain or discomfort in one or both arms, the back, neck, jaw, or stomach.  Shortness of breath with or without chest discomfort.  Other signs may include breaking out in a cold sweat, nausea, or lightheadedness. Don't wait more than five minutes to call 911 - MINUTES MATTER! Fast action can save your life. Calling 911 is almost always the fastest way to get lifesaving treatment. Emergency Medical Services staff can begin treatment when they arrive -- up to an hour sooner than if someone gets to the hospital by car. The discharge information has been reviewed with the patient. The patient verbalized understanding. Discharge medications reviewed with the patient and appropriate educational materials and side effects teaching were provided.

## 2017-07-02 NOTE — PROGRESS NOTES
Care Management Interventions  Mode of Transport at Discharge:  (spouse)  Transition of Care Consult (CM Consult): Home Health ([facility] )  [address]: No  Reason Outside [facility]: Patient already serviced by other home care/hospice agency  Discharge Durable Medical Equipment: No  Speech Therapy Consult: Yes  Current Support Network: Lives with Spouse  Confirm Follow Up Transport: Family  Plan discussed with Pt/Family/Caregiver: Yes  Freedom of Choice Offered: Yes  Discharge Location  Discharge Placement: Home with home health (Pt D/C home today with spouse.   Alerted [facility] to resume [facility] care.)

## 2017-07-02 NOTE — PROGRESS NOTES
Spoke with Doctor Marjorie Acuña in regards to craven cath, Doctor Marjorie Acuña recommends to leave the craven in for one week.

## 2017-07-02 NOTE — ED PROVIDER NOTES
Patient is a 58 y.o. male presenting with urinary catheter problem. The history is provided by the patient and the spouse. Urinary Catheter Problem    This is a new problem. The current episode started 1 to 2 hours ago. The problem has not changed since onset. The pain is at a severity of 0/10. The patient is experiencing no pain. He is not sexually active. Pertinent negatives include no nausea, no vomiting, no urgency and no flank pain. He has tried nothing for the symptoms. His past medical history is significant for urinary catheter problem. Past Medical History:   Diagnosis Date    Alcohol abuse, in remission 6/13/2017    Arthritis     CAD (coronary artery disease) 2008    MI 2006 with stent, stent 2008    Chronic hyponatremia 3/3/2014    Chronic pain     generalized    Dyslipidemia     GERD (gastroesophageal reflux disease)     Hypercholesterolemia     Hypertension     under control with med    Insulin dependent diabetes mellitus ([address] type 2 since 2005    insulin reliant. bs: \"120'S\" BUT RUNS HIGH    Medical non-compliance 6/25/2012    MI (myocardial infarction) ([address]  2008    EF 55%    Other postprocedural status 1/2/2014 07/03/2013: S/P Right common femoral endarterectomy     PAD (peripheral artery disease) ([address] 10/5/2009    10/24/12 Graft thrombectomy through leg incision of left iliac  to popliteal bypass and left popliteal to posterior tibial bypass -Dr. Uday Rodriguez  10/15/09 Left external Iliac artery to above the knee popliteal artery bypass graft propaten PTFE, stenting L EIA- Dr. Uday Rodriguez 10/5/09 Open amputation left 2nd, 3rd toes- Dr. Maricruz Vieira 11/6/08 Left common femoral, superficial femoral and profunda femoris art    PUD (peptic ulcer disease)     PVD (peripheral vascular disease) ([address]     Seizures ([address]     x1 about 2010?     Sepsis ([address] 2/26/2014    Thrombosis of Left External Iliac to above knee popliteal artery bypass graft 9/23/2010    Tobacco abuse 10/5/2009 Past Surgical History:   Procedure Laterality Date    AMPUTATION TOE,I-P JT  2008    3 toes on left foot    BYPASS GRAFT OTHR,ILIOFEMORAL  2009    left     CARDIAC SURG PROCEDURE UNLIST  2009    2-3 stents    HX ABOVE KNEE AMPUTATION Right 2014    HX AMPUTATION Left 11/14/2012    Left leg above the knee amputation     HX FEMORAL BYPASS  8/29/12    OCCLUDED GRAFT    HX THROMBECTOMY  7/02/12    Graft thrombectomy, left common iliac artery angioplasty and stent, left popliteal artery balloon angioplasty    HX THROMBECTOMY  10/22/2012    Graft thrombectomy through leg incision of left iliac to popliteal bypass and left popliteal to posterior tibial bypass    VASCULAR SURGERY PROCEDURE UNLIST  07/03/2013    R common femoral endarterectomy, re-do groin dissection    VASCULAR SURGERY PROCEDURE UNLIST  06/13/2011    R common femoral endarterectomy, left common and external iliac artery balloon angioplasty,  selective catheterization of left external iliac artery from right common femoral artery    VASCULAR SURGERY PROCEDURE UNLIST  06/25/2012    L Femoral-popliteal graft thrombectomy         Family History:   Problem Relation Age of Onset    Stroke Mother     Hypertension Mother     Diabetes Other        Social History     Social History    Marital status: LEGALLY      Spouse name: N/A    Number of children: N/A    Years of education: N/A     Occupational History    Not on file. Social History Main Topics    Smoking status: Current Every Day Smoker     Packs/day: 0.25     Years: 46.00    Smokeless tobacco: Never Used    Alcohol use 2.0 oz/week     4 Standard drinks or equivalent per week    Drug use: Yes     Special: Marijuana      Comment: no longer uses    Sexual activity: Not on file     Other Topics Concern    Not on file     Social History Narrative         ALLERGIES: Contrast agent [iodine] and Restoril [temazepam]    Review of Systems   Constitutional: Negative.     HENT: Negative. Respiratory: Negative. Cardiovascular: Negative. Gastrointestinal: Negative. Negative for nausea and vomiting. Endocrine: Negative. Genitourinary: Negative. Negative for flank pain and urgency. Musculoskeletal: Negative. Skin: Negative. Neurological: Negative. Hematological: Negative. Vitals:    07/02/17 1515   BP: 120/62   Pulse: 88   Resp: 17   Temp: 99.5 °F (37.5 °C)   SpO2: 95%   Weight: 48.5 kg (107 lb)   Height: 3' 5\" (1.041 m)            Physical Exam   Constitutional: He is oriented to person, place, and time. He appears well-developed and well-nourished. No distress. HENT:   Head: Normocephalic and atraumatic. Cardiovascular: Intact distal pulses. Pulmonary/Chest: Effort normal.   Abdominal: Soft. He exhibits no distension. There is no tenderness. There is no rebound. Genitourinary:   Genitourinary Comments: Urinary catheter with clear urine. Dried blood around meatus but not apparently in urine. Neurological: He is alert and oriented to person, place, and time. Skin: Skin is warm and dry. Psychiatric: He has a normal mood and affect. His behavior is normal.   Nursing note and vitals reviewed. MDM  Number of Diagnoses or Management Options  Complaint about urinary catheter:   Diagnosis management comments: Spoke with the  upstairs who was familiar with this patient. She states that at time of discharge they were agreeable with outpatient follow-up and home health but now the spouse states that she was preferring inpatient management. She feels overwhelmed and that she can't care for him alone. The  states that she will contact the previous rehabilitation facility and see if this patient could be directly admitted back to the facility due to their concerns or if they felt he would have to continue with home health management alone at this time.   As far as the urinary catheter does not appear to be in acute complication but urinalysis is pending. Plan was to follow-up for urology in 1 week for removal due to urinary retention complaints. Appears initial complaint from either local irritation vs. \"tugging\" on catheter. Patient without pain and urine still flowing. At this point still awaiting return phone call from the case management upstairs to discuss options for this patient and family. Rehab facility accepted return from ER. SW to come to ER and complete transfer paperwork. Patient and spouse updated.        Amount and/or Complexity of Data Reviewed  Clinical lab tests: ordered      ED Course       Procedures

## 2017-07-02 NOTE — ED TRIAGE NOTES
Patient discharged home from 8th floor this AM.  Now with bleeding at craven insertion site. No blood noted in bag. Per ems, patient and wife are living with daughter currently and daughter made the statement that they can not take care of patient in that condition.

## 2017-07-02 NOTE — PROGRESS NOTES
[facility] CRITICAL CARE OUTREACH NURSE PROGRESS REPORT      SUBJECTIVE: Called to assess patient secondary to transfer from critical care. MEWS Score: 1 (07/02/17 5225)  Vitals:    07/02/17 0326 07/02/17 1204 07/02/17 0752 07/02/17 1142   BP: 99/51  128/69 117/62   Pulse: 80  88 89   Resp: 16  16 18   Temp: 98.4 °F (36.9 °C)  99 °F (37.2 °C) 99.7 °F (37.6 °C)   SpO2: 95%  96% 94%   Weight:  48.5 kg (107 lb)     Height:              LAB DATA:    Recent Labs      07/02/17   0535  07/01/17   0430  06/30/17   0445   NA  135*  134*  136   K  3.9  4.0  3.6   CL  100  99  100   CO2  25  26  26   AGAP  10  9  10   GLU  96  241*  132*   BUN  20  31*  37*   CREA  0.66*  0.80  0.96   GFRAA  >60  >60  >60   GFRNA  >60  >60  >60   CA  8.1*  8.0*  7.9*   MG   --   2.0  2.3        Recent Labs      07/02/17   0535  07/01/17   1705  07/01/17   0430   HGB  9.1*  9.9*  9.0*   HCT  27.1*  28.9*  26.4*          OBJECTIVE: On arrival to room, I found patient to be sleeping with family at bedside         Pain Assessment  Pain Intensity 1: 0 (07/02/17 0326)  Pain Location 1: Head  Pain Intervention(s) 1: Medication (see MAR)  Patient Stated Pain Goal: 0  ASSESSMENT: pt awakens to voice. NAD. denies pain and SOB. States he wants to go home. PLAN:  Will continue to follow per outreach protocol.

## 2017-07-02 NOTE — PROGRESS NOTES
[facility] CARDIOLOGY PROGRESS NOTE           7/2/2017 10:37 AM    Admit Date: 6/27/2017      Subjective:   Patient stable on current medical regimen. Denies chest pain or dyspnea. ROS:  Cardiovascular:  As noted above    Objective:      Vitals:    07/01/17 2310 07/02/17 0326 07/02/17 0613 07/02/17 0752   BP: 96/52 99/51  128/69   Pulse: 80 80  88   Resp: 24 16  16   Temp: 98.4 °F (36.9 °C) 98.4 °F (36.9 °C)  99 °F (37.2 °C)   SpO2: 100% 95%  96%   Weight:   48.5 kg (107 lb)    Height:           Physical Exam:  General-No Acute Distress  Neck- supple, mild JVD  CV- regular rate and rhythm Grade II/VI CARLINE at apex. Lung- clear bilaterally  Abd- soft, nontender, nondistended  Ext- Bilateral AKA. Skin- warm and dry      Data Review:   Recent Labs      07/02/17   0535  07/01/17   1705  07/01/17   0430   06/30/17   0445   NA  135*   --   134*   --   136   K  3.9   --   4.0   --   3.6   MG   --    --   2.0   --   2.3   BUN  20   --   31*   --   37*   CREA  0.66*   --   0.80   --   0.96   GLU  96   --   241*   --   132*   HGB  9.1*  9.9*  9.0*   < >  9.3*   HCT  27.1*  28.9*  26.4*   < >  27.2*    < > = values in this interval not displayed. No results found for: Evalee Solders, [facility]    Echo (6/28/17):  SUMMARY:  -  Left ventricle: The ventricle was mildly dilated. Systolic function was severely reduced. Ejection fraction was estimated in the range of 30 % to 35 %. There was severe diffuse hypokinesis. -  Right ventricle: The ventricle was mildly dilated. Systolic function was reduced. -  Left atrium: The atrium was mildly dilated. -  Right atrium: The atrium was mildly dilated. -  Inferior vena cava, hepatic veins: The inferior vena cava was dilated. The respirophasic change in diameter was less than 50%. -  Mitral valve: There was moderate regurgitation. -  Tricuspid valve: There was mild to moderate regurgitation.       Assessment/Plan:     Principal Problem:    Hypoglycemia (6/27/2017)    Defer management to primary team.      Active Problems:    CAD (coronary artery disease) (6/25/2012)    Low level troponin elevation felt supply/demand imbalance in setting of sepsis and hypotension. Suspect significant CAD with CT showing coronary calcifications and known severe PVDz. No angina and given co-morbities would focus on medical management. May consider outpatient ischmia evaluation as condition stabilizes if patient desires (refuses EGD). Medical non-compliance (6/25/2012)    Noted. Complicates long term management. DM (diabetes mellitus) type II uncontrolled, periph vascular disorder ([facility] (11/14/2012)    Defer management to primary team.          Sepsis ([facility] (2/26/2014)    Continue antibiotics. Chronic systolic heart failure  On appropriate medical therapy. Currently well compensated. Stable from CV standpoint. Continue current medical therapy. Will sign off. Please call with any questions or clinical changes. Appreciate consultation.                   Luis Manuel Wing MD  7/2/2017 10:37 AM

## 2017-07-02 NOTE — PROGRESS NOTES
Date of Outreach Update:  Amelie Ross was seen resting in no apparent distress. Respirations even and unlabored. Will continue to follow up per outreach protocol.     Signed By:   Maribell Poe RN    July 2, 2017 3:35 AM

## 2017-07-02 NOTE — PROGRESS NOTES
Date of Outreach Update:  Nelsy Hill was seen resting with eyes closed. Respirations even and unlabored. MEWS Score: 1 (07/01/17 1557)  Vitals:    07/01/17 1308 07/01/17 1557 07/01/17 1717 07/01/17 1946   BP: 118/67 92/52 106/58 115/59   Pulse: 86 87 77 89   Resp: 16 14  12   Temp: 99.4 °F (37.4 °C) 100.1 °F (37.8 °C)  98.1 °F (36.7 °C)   SpO2: 95% 96%  96%   Weight:       Height:             Pain Assessment  Pain Intensity 1: 6 (07/01/17 1620)  Pain Location 1: Head  Pain Intervention(s) 1: Medication (see MAR)  Patient Stated Pain Goal: 0      Previous Outreach assessment has been reviewed. There have been no significant clinical changes since the completion of the last dated Outreach assessment. Will continue to follow up per outreach protocol.     Signed By:   Ruthie Frank RN    July 1, 2017 10:49 PM

## 2017-07-02 NOTE — PROGRESS NOTES
PM assessment completed. HOB elevated. AAO x 4.  irrigation running, urine yellow/straw with no clots in bag. Irrigation bag marked at 2250 at beginning of shift. Dressing to perianal wound c/d/i and not due to be changed. Aware to call for assistance when needed. Bed locked, in low position, call light in reach. Will monitor.

## 2017-07-02 NOTE — DISCHARGE SUMMARY
Hospitalist Discharge Summary     Patient ID:  Manisha Marquez  [id number]  58 y.o.  1955  Admit date: 6/27/2017  7:36 AM  Discharge date and time: 7/2/2017  Attending: Jeff Mccain MD  PCP:  Marie Solis MD  Treatment Team: Attending Provider: Jeff Mccain MD; Speech Language Pathologist: KRYSTLE Mallory; Consulting Provider: Courtney Heredia MD    Principal Diagnosis Hypoglycemia   Principal Problem:    Hypoglycemia (6/27/2017)    Active Problems:    Tobacco abuse (10/5/2009)      Overview: QUIT       ETOH abuse (6/10/2011)      Overview: 12 pk of beer/ day for \"years\" until a month and have ago      CAD (coronary artery disease) (6/25/2012)      Medical non-compliance (6/25/2012)      DM (diabetes mellitus) type II uncontrolled, periph vascular disorder ([address] (11/14/2012)      Sepsis ([address] (2/26/2014)      Acute renal failure ([address] (12/5/2014)      Hypotension (12/5/2014)      S/P AKA (above knee amputation) bilateral ([address] (12/5/2014)      Overview: Due to PVD, diabetes etc      Malnutrition ([address] (12/30/2014)      Underweight (12/30/2014)      Chronic systolic congestive heart failure ([address] (6/28/2017)      EVANGELINA (acute kidney injury) ([address] (6/28/2017)      Hyperkalemia (6/28/2017)      Acute metabolic encephalopathy (6/36/9649)      Elevated liver enzymes (6/28/2017)      Anemia (6/28/2017)      Hypoxemia (6/28/2017)      Pleural effusion, bilateral (6/28/2017)      Pulmonary infiltrates (6/28/2017)         Hospital Course: For details of presentation, please refer to admission H&P. In summary,    Mr. Suellen Roa is a 58 yom with hx of dm, seizure, CAD severe pvd s/p bilateral aka presented from snf with hypoglycemia reportedly in 35s. Given dextrose by EMS and glucose improved on admission to ER. Started on vanco and zosyn in ER for suspected LLL pneumonia. Recently admitted for perirectal fistula and d/c to SNF on 6/16. HbA1c:11.8 on 6/17/17.  Transferred to ICU for unresponsiveness, Low BP and started on fluid bolus and pressors. Hgb dropped to 5.7 from 8.9 given 1u prbc with lasix. Stool occult blood was +ve and he was seen by GI, refused EGD/Colonoscopy. His TTE showed EF 35% with hypokinesis. Seen by Cards and pulm. Started on lasix, aldactone, coreg and Cards recommended outpt f/u and possible ischemic workup if pt agrees. He pulled out his craven and started having blood with clots. Urology was called and started him on CBI. He needs to keep the craven in for a week and follow up with urology in 1 week. Urine has cleared and He is doing better and hgb stable. He is being discharged to Home with [facility]. Plan discussed with pt who is in agreement with the plan. All questions answered. Pt was stable at time of discharge. Follow up as per below. Significant Diagnostic Imaging:     TTE:    LEFT VENTRICLE: The ventricle was mildly dilated. Systolic function was  severely reduced. Ejection fraction was estimated in the range of 30 % to 35   %. There was severe diffuse hypokinesis. Wall thickness was normal. The study   was  not technically sufficient to allow evaluation of LV diastolic function. RIGHT VENTRICLE: The ventricle was mildly dilated. Systolic function was  reduced. Estimated peak pressure was in the range of 30-35 mmHg. LEFT ATRIUM: The atrium was mildly dilated. RIGHT ATRIUM: The atrium was mildly dilated. SYSTEMIC VEINS: IVC: The inferior vena cava was dilated. The respirophasic  change in diameter was less than 50%. AORTIC VALVE: The valve was probably trileaflet. Leaflets exhibited mild  sclerosis. There was no evidence for stenosis. There was no insufficiency. MITRAL VALVE: There was mild diffuse thickening. There was no evidence for  stenosis. There was moderate regurgitation. TRICUSPID VALVE: There was no evidence for stenosis. There was mild to   moderate  regurgitation. PULMONIC VALVE: Not well visualized. There was no evidence for stenosis. There  was no insufficiency. PERICARDIUM: There was no pericardial effusion. AORTA: The root exhibited normal size. Labs: Results:       Chemistry Recent Labs      07/02/17   0535  07/01/17   0430  06/30/17   0445   GLU  96  241*  132*   NA  135*  134*  136   K  3.9  4.0  3.6   CL  100  99  100   CO2  25  26  26   BUN  20  31*  37*   CREA  0.66*  0.80  0.96   CA  8.1*  8.0*  7.9*   AGAP  10  9  10      CBC w/Diff Recent Labs      07/02/17   0535  07/01/17   1705  07/01/17   0430   HGB  9.1*  9.9*  9.0*   HCT  27.1*  28.9*  26.4*      Cardiac Enzymes No results for input(s): CPK, CKND1, TINO in the last 72 hours. No lab exists for component: CKRMB, TROIP   Coagulation No results for input(s): PTP, INR, APTT in the last 72 hours. No lab exists for component: INREXT    Last A1c Lab Results   Component Value Date/Time    Hemoglobin A1c 11.8 06/17/2017 01:05 PM      Lipid Panel Lab Results   Component Value Date/Time    Cholesterol, total 141 10/22/2012 06:00 AM    HDL Cholesterol 61 10/22/2012 06:00 AM    LDL, calculated 70.2 10/22/2012 06:00 AM    VLDL, calculated 9.8 10/22/2012 06:00 AM    Triglyceride 49 10/22/2012 06:00 AM    CHOL/HDL Ratio 2.3 10/22/2012 06:00 AM      BNP No results for input(s): BNPP in the last 72 hours. Liver Enzymes No results for input(s): TP, ALB, TBIL, AP, SGOT, GPT in the last 72 hours.     No lab exists for component: DBIL   Thyroid Studies Lab Results   Component Value Date/Time    TSH 1.340 06/28/2017 10:30 AM            Discharge Exam:  Patient Vitals for the past 24 hrs:   Temp Pulse Resp BP SpO2   07/02/17 1142 99.7 °F (37.6 °C) 89 18 117/62 94 %   07/02/17 0752 99 °F (37.2 °C) 88 16 128/69 96 %   07/02/17 0326 98.4 °F (36.9 °C) 80 16 99/51 95 %   07/01/17 2310 98.4 °F (36.9 °C) 80 24 96/52 100 %   07/01/17 1946 98.1 °F (36.7 °C) 89 12 115/59 96 %   07/01/17 1717 - 77 - 106/58 -   07/01/17 1557 100.1 °F (37.8 °C) 87 14 92/52 96 %     Visit Vitals    /62    Pulse 89    Temp 99.7 °F (37.6 °C)    Resp 18    Ht 3' 5\" (1.041 m)  Comment: bilateral AKA    Wt 48.5 kg (107 lb)    SpO2 94%    BMI 44.75 kg/m2     General appearance: alert, cooperative, no distress  Lungs: CTABL  Heart: RRR, no m/r/g  Abdomen: soft, non-tender. Bowel sounds normal.   Extremities: b/l AKA  Neurologic: Grossly normal    Disposition: HHPT  Discharge Condition: stable  Patient Instructions: Cardiac diabetic diet  Activity as tolerated. Current Discharge Medication List      START taking these medications    Details   carvedilol (COREG) 6.25 mg tablet Take 1 Tab by mouth two (2) times daily (with meals). Qty: 60 Tab, Refills: 1      fluconazole (DIFLUCAN) 200 mg tablet Take 1 Tab by mouth daily for 8 days. Qty: 8 Tab, Refills: 0      furosemide (LASIX) 40 mg tablet Take 1 Tab by mouth daily. Qty: 30 Tab, Refills: 1      nicotine (NICODERM CQ) 14 mg/24 hr patch 1 Patch by TransDERmal route daily for 30 days. Qty: 24 Patch, Refills: 0      spironolactone (ALDACTONE) 25 mg tablet Take 1 Tab by mouth daily. Qty: 30 Tab, Refills: 1      levoFLOXacin (LEVAQUIN) 750 mg tablet Take 1 Tab by mouth daily. Qty: 4 Tab, Refills: 0         CONTINUE these medications which have CHANGED    Details   lisinopril (PRINIVIL, ZESTRIL) 10 mg tablet Take 1 Tab by mouth daily. Qty: 30 Tab, Refills: 0         CONTINUE these medications which have NOT CHANGED    Details   insulin glargine (LANTUS) 100 unit/mL injection 20 Units by SubCUTAneous route every morning. Qty: 1 Vial, Refills: 0      HYDROcodone-acetaminophen (NORCO) 5-325 mg per tablet Take 1 Tab by mouth every four (4) hours as needed. Max Daily Amount: 6 Tabs. Qty: 15 Tab, Refills: 0      polyethylene glycol (MIRALAX) 17 gram packet Take 1 Packet by mouth daily as needed. Qty: 30 Each, Refills: 0      senna-docusate (PERICOLACE) 8.6-50 mg per tablet Take 2 Tabs by mouth daily.   Qty: 30 Tab, Refills: 0      lacosamide (VIMPAT) 100 mg tab tablet Take 100 mg by mouth two (2) times a day. levETIRAcetam (KEPPRA) 500 mg tablet Take 1 Tab by mouth two (2) times a day. Qty: 60 Tab, Refills: 3      atorvastatin (LIPITOR) 80 mg tablet Take 80 mg by mouth daily. aspirin delayed-release 81 mg tablet Take 81 mg by mouth daily. gabapentin (NEURONTIN) 300 mg capsule Take 2 Caps by mouth three (3) times daily. Qty: 180 Cap, Refills: 3      ranitidine (ZANTAC) 150 mg tablet Take 150 mg by mouth two (2) times a day. Vaccinations:   Pt screened by nursing for pneumococcal and influenza vaccination needs at discharge, will be ordered if needed and pt consented. Immunization History   Administered Date(s) Administered    TB Skin Test (PPD) Intradermal 02/28/2014, 06/13/2017       Follow-up Information     Follow up With Details Comments [address] [place] [address]   [address] [facility]  [address] Cardiology In 3 weeks  [address] [place]   [address] [facility] [address] [facility] [address]  Cecelia Christiansen MD   [address] [facility] [address] [facility] [address]      Matthew Bowden MD In 1 week please call monday to schedule appt for one week. [address] [facility]  [phone]            Time spent in the discharge process was 35 minutes.       Signed By: Sisi Moore MD     July 2, 2017

## 2017-07-03 NOTE — PROGRESS NOTES
Please note most recent documentation from 7/2/17 ED d/c states patient was discharged to [facility] facility. Attempted several calls to patient and family to verify as IP d/c from 7/2/17 states patient was discharged home with [place]. [facility] is a preferred network facility and is followed by Crystal Yee RN. Crystal Yee RN notified regarding d/c. Enoc Munoz LPN/ Care Coordinator  [facility] [address]. [place] [address] / [place], [address] [facility]  [email or web address] note will not be viewable in [address]

## 2017-07-04 NOTE — ED NOTES
Encounter Date: 2017    SCRIBE #1 NOTE: I, Matty Beck, am scribing for, and in the presence of,  Dr. Leiva. I have scribed the following portions of the note - Other sections scribed: HPI, ROS, PE.       History     Chief Complaint   Patient presents with    Chest Pain     chest tightness since yest, denies cardiac hx     Time patient was seen by the provider: 10:31 AM      The patient is a 63 y.o. female with hx of: thyroid CA, GERD, breast CA that presents to the ED with a complaint of chest tightness x 2 days. Pt reports intermittent worsening chest tightness with associated lightheadedness, nausea and chills x 3 days. She has never experienced these sx before. Her sx are not worsened by any particular factors. She denies diarrhea, fever, cough, abdominal pain, trouble swallowing. Of note, pt on Letrozole following her Breast CA approximately one year ago that was treated with radiation therapy. No hx of blood clots.           Review of patient's allergies indicates:   Allergen Reactions    Codeine Nausea Only    Sulfa (sulfonamide antibiotics) Nausea Only     Past Medical History:   Diagnosis Date    Breast cancer     Cancer     GERD (gastroesophageal reflux disease)     History of thyroid cancer     Mitral valve prolapse     Psychiatric problem      Past Surgical History:   Procedure Laterality Date    BREAST SURGERY       SECTION      CYST REMOVED FROM RIGHT BREAST IN       HERNIA REPAIR      left breast wire localization excisional biopsy with bracketed wires using 3 wires and excision through 1 incision.       thyroid removed      THYROID SURGERY      TUBAL LIGATION       Family History   Problem Relation Age of Onset    Osteoporosis Mother     Stroke Mother     Dementia Father     Breast cancer Maternal Grandmother 88    Breast cancer Other      70-80's    Colon cancer Neg Hx     Colon polyps Neg Hx     Rectal cancer Neg Hx     Stomach cancer Neg Hx      Patient given additional 8 oz juice. Esophageal cancer Neg Hx     Liver cancer Neg Hx     Liver disease Neg Hx     Cirrhosis Neg Hx     Inflammatory bowel disease Neg Hx     Celiac disease Neg Hx     Crohn's disease Neg Hx     Ulcerative colitis Neg Hx     Ovarian cancer Neg Hx     Cancer Neg Hx      Social History   Substance Use Topics    Smoking status: Former Smoker    Smokeless tobacco: Never Used      Comment: socally a few years in college. none since 30 years ago    Alcohol use No      Comment: none since April 2016     Review of Systems   Constitutional: Positive for chills.   HENT: Negative for sore throat.    Eyes: Negative for visual disturbance.   Respiratory: Positive for chest tightness (mild relief of sx).    Cardiovascular: Negative for chest pain.   Gastrointestinal: Positive for nausea. Negative for diarrhea and vomiting.   Genitourinary: Negative for dysuria.   Musculoskeletal: Negative for back pain.   Skin: Negative for rash.   Neurological: Positive for light-headedness. Negative for weakness.       Physical Exam     Initial Vitals [07/04/17 1002]   BP Pulse Resp Temp SpO2   (!) 145/57 96 18 98.4 °F (36.9 °C) 100 %      MAP       86.33         Physical Exam    Nursing note and vitals reviewed.  Constitutional: She appears well-developed and well-nourished. She is not diaphoretic. No distress.   HENT:   Head: Normocephalic and atraumatic.   Mouth/Throat: Oropharynx is clear and moist.   Neck: Normal range of motion. Neck supple. No thyromegaly present.   Cardiovascular: Normal rate and regular rhythm.   No murmur heard.  Pulmonary/Chest: Breath sounds normal. No respiratory distress.   Unable to reproduce pt's CP with chest wall palpation.    Abdominal: Soft. There is no tenderness (mild mid epigastric ttp).   No other significant findings.   Musculoskeletal: Normal range of motion. She exhibits no edema or tenderness.   Neurological: She is alert and oriented to person, place, and time. She has normal strength. No  cranial nerve deficit or sensory deficit.   Skin: Skin is warm and dry. No rash noted.         ED Course   Procedures  Labs Reviewed - No data to display          Medical Decision Making:   History:   Old Medical Records: I decided to obtain old medical records.  ED Management:  Patient discharge instructions.    Your cardiac enzymes do not indicate any recent damage to your heart.  Your chest x-ray shows no acute problems.  Your blood tests also do not show any evidence of significant electrolyte imbalances, dangerous anemia, or problems w/ your liver or kidney function.   Your chest CT shows you do NOT have a pulmonary embolus.    It does show a moderately large hiatal hernia.    People who have had GERD can sometimes get significant pain if these hernias become inflamed w/ acid.  The location of your pain under your lower breastbone area is where a hiatal hernia would tend to refer it's pain.      While the tests do not identify a definite cause for your chest discomfort---it does not appear at this time that the symptoms are immediate dangerous to you.     I would recommend that you resume your anti-reflux treatment daily until you follow-up with your PCP or GI specialist.  I will write you a prescription for Nexium x 2 months.      You may wish to try an OTC antacid for temporary relief this week as the Nexium does take 3-4 days to become effective.      I would also suggest you stop the meloxicam if you are still using it, and avoid OTC NSAIDs (Aleve, etc) until you follow up w/ your doctor.             Scribe Attestation:   Scribe #1: I performed the above scribed service and the documentation accurately describes the services I performed. I attest to the accuracy of the note.    Attending Attestation:           Physician Attestation for Scribe:  Physician Attestation Statement for Scribe #1: I, Dr. Leiva, reviewed documentation, as scribed by Matty Beck in my presence, and it is both accurate and  complete.                 ED Course     Clinical Impression:   The primary encounter diagnosis was Chest pain at rest. Diagnoses of Chest pain, Exertional dyspnea, Hiatal hernia, and H/O gastroesophageal reflux (GERD) were also pertinent to this visit.                           Fernando Leiva MD  07/04/17 4117

## 2017-07-05 NOTE — PROGRESS NOTES
CM reviewed record. CM noted the issues with determining where the patient discharged to. CM called facility and confirmed the patient is in house at this time. CM emailed Shira Hsieh, as this facility is a partnering facility, which she hosts calls for each week, CM will follow patient through calls        This note will not be viewable in [address]

## 2017-07-13 NOTE — PROGRESS NOTES
CM informed today, that the patient is at home with his girlfriend, by the SNF partnership calls. CM attempted to reach patient, not successful. CM will re-attempt at a later date      This note will not be viewable in [address]

## 2017-07-21 PROBLEM — E87.1 ACUTE HYPONATREMIA: Status: ACTIVE | Noted: 2017-01-01

## 2017-07-21 PROBLEM — N17.9 ACUTE PRERENAL FAILURE (HCC): Status: ACTIVE | Noted: 2017-01-01

## 2017-07-21 PROBLEM — E87.5 ACUTE HYPERKALEMIA: Status: ACTIVE | Noted: 2017-01-01

## 2017-07-21 PROBLEM — F10.11 ALCOHOL ABUSE, IN REMISSION: Chronic | Status: RESOLVED | Noted: 2017-01-01 | Resolved: 2017-01-01

## 2017-07-21 NOTE — H&P
HOSPITALIST H&P      NAME:  Kevin Garcia   Age:  58 y.o.  :   1955   MRN:   [id number]    PCP: Oscar Arce MD    Attending MD: Tiarra Cunningham DO    Treatment Team: Attending Provider: Mane Still MD; Primary Nurse: Vee Cotton RN    HPI:     Kevin Garcia is a 58year old underweight AAM with a PMH of PAD s/p bilateral AKA, CAD, chronic HFREF (EF 35%), IDDM2, CAD, and history of tobacco and alcohol abuse presented to the ER from home with progressive lethargy, fatigue, mild confusion, sacral pain, flank pain, and suprapubic pain. Per his wife, the patient was at [facility] until two weeks ago and \"was doing fine\", but 2 days after returning home he \"just wasn't the same\" and has been progressively becoming weaker and more lethargic over those two weeks. He was discharged on 17 from [facility] to [facility] with a Alas catheter. He kept this until his last day at rehab where they removed it just prior to being discharged. In the ER he was found to have pyruia and leukocytosis along with the symptoms above. The wife states that she cannot care for him \"like this\" at home. Complete ROS done and is as stated in HPI or otherwise negative    Past Medical History:   Diagnosis Date    Alcohol abuse, in remission 2017    Arthritis     CAD (coronary artery disease)     MI  with stent, stent     Chronic hyponatremia 3/3/2014    Chronic pain     generalized    Dyslipidemia     GERD (gastroesophageal reflux disease)     Hypercholesterolemia     Hypertension     under control with med    Insulin dependent diabetes mellitus ([address] type 2 since     insulin reliant.  bs: \"120'S\" BUT RUNS HIGH    Medical non-compliance 2012    MI (myocardial infarction) ([address]      EF 55%    Other postprocedural status 2013: S/P Right common femoral endarterectomy     PAD (peripheral artery disease) ([address] 10/5/2009    10/24/12 Graft thrombectomy through leg incision of left iliac  to popliteal bypass and left popliteal to posterior tibial bypass -Dr. Dorcas Lucas  10/15/09 Left external Iliac artery to above the knee popliteal artery bypass graft propaten PTFE, stenting L EIA- Dr. Dorcas Lucas 10/5/09 Open amputation left 2nd, 3rd toes- Dr. Cherry Hatch 11/6/08 Left common femoral, superficial femoral and profunda femoris art    PUD (peptic ulcer disease)     [facility]) [facility]     Seizures ([facility]     x1 about 2010?  Sepsis ([facility] 2/26/2014    Thrombosis of Left External Iliac to above knee popliteal artery bypass graft 9/23/2010    Tobacco abuse 10/5/2009        Past Surgical History:   Procedure Laterality Date    AMPUTATION TOE,I-P JT  2008    3 toes on left foot    BYPASS GRAFT OTHR,ILIOFEMORAL  2009    left     CARDIAC SURG PROCEDURE UNLIST  2009    2-3 stents    HX ABOVE KNEE AMPUTATION Right 2014    HX AMPUTATION Left 11/14/2012    Left leg above the knee amputation     HX FEMORAL BYPASS  8/29/12    OCCLUDED GRAFT    HX THROMBECTOMY  7/02/12    Graft thrombectomy, left common iliac artery angioplasty and stent, left popliteal artery balloon angioplasty    HX THROMBECTOMY  10/22/2012    Graft thrombectomy through leg incision of left iliac to popliteal bypass and left popliteal to posterior tibial bypass    VASCULAR SURGERY PROCEDURE UNLIST  07/03/2013    R common femoral endarterectomy, re-do groin dissection    VASCULAR SURGERY PROCEDURE UNLIST  06/13/2011    R common femoral endarterectomy, left common and external iliac artery balloon angioplasty,  selective catheterization of left external iliac artery from right common femoral artery    VASCULAR SURGERY PROCEDURE UNLIST  06/25/2012    L Femoral-popliteal graft thrombectomy        Prior to Admission Medications   Prescriptions Last Dose Informant Patient Reported? Taking?    HYDROcodone-acetaminophen (NORCO) 5-325 mg per tablet   No No   Sig: Take 1 Tab by mouth every eight (8) hours as needed for Pain. Max Daily Amount: 3 Tabs. aspirin delayed-release 81 mg tablet   Yes No   Sig: Take 81 mg by mouth daily. atorvastatin (LIPITOR) 80 mg tablet   Yes No   Sig: Take 80 mg by mouth daily. carvedilol (COREG) 6.25 mg tablet   No No   Sig: Take 1 Tab by mouth two (2) times daily (with meals). furosemide (LASIX) 40 mg tablet   No No   Sig: Take 1 Tab by mouth daily. gabapentin (NEURONTIN) 300 mg capsule   No No   Sig: Take 2 Caps by mouth three (3) times daily. insulin glargine (LANTUS) 100 unit/mL injection   No No   Si Units by SubCUTAneous route every morning. lacosamide (VIMPAT) 100 mg tab tablet   Yes No   Sig: Take 100 mg by mouth two (2) times a day. levETIRAcetam (KEPPRA) 500 mg tablet   No No   Sig: Take 1 Tab by mouth two (2) times a day. levoFLOXacin (LEVAQUIN) 750 mg tablet   No No   Sig: Take 1 Tab by mouth daily. lisinopril (PRINIVIL, ZESTRIL) 10 mg tablet   No No   Sig: Take 1 Tab by mouth daily. nicotine (NICODERM CQ) 14 mg/24 hr patch   No No   Si Patch by TransDERmal route daily for 30 days. polyethylene glycol (MIRALAX) 17 gram packet   No No   Sig: Take 1 Packet by mouth daily as needed. ranitidine (ZANTAC) 150 mg tablet   Yes No   Sig: Take 150 mg by mouth two (2) times a day. senna-docusate (PERICOLACE) 8.6-50 mg per tablet   No No   Sig: Take 2 Tabs by mouth daily. spironolactone (ALDACTONE) 25 mg tablet   No No   Sig: Take 1 Tab by mouth daily. Facility-Administered Medications: None       Allergies   Allergen Reactions    Contrast Agent [Iodine] Hives     IVP DYE/Contrast, pt medicated with prednisone 50mg x 3 and still had hives, itching.     DID WELL THE NEXT TIME WITHOUT ANY SYMPTOMS    Restoril [Temazepam] Hives        Social History   Substance Use Topics    Smoking status: Current Every Day Smoker     Packs/day: 0.25     Years: 46.00    Smokeless tobacco: Never Used    Alcohol use 2.0 oz/week     4 Standard drinks or equivalent per week        Family History   Problem Relation Age of Onset    Stroke Mother     Hypertension Mother     Diabetes Other         Objective:       Visit Vitals    /72    Pulse 84    Temp 97.3 °F (36.3 °C)    Resp 18    Wt 49.9 kg (110 lb)    SpO2 93%    BMI 46.01 kg/m2        Temp (24hrs), Av.3 °F (36.3 °C), Min:97.3 °F (36.3 °C), Max:97.3 °F (36.3 °C)      Oxygen Therapy  O2 Sat (%): 93 % (17 1625)  Pulse via Oximetry: 84 beats per minute (17 1625)  O2 Device: Room air (17 1300)      Physical Exam:      General:    Alert, cooperative, no distress, appears stated age. Eyes:   PERRLA EOMI Anicteric  Head:   Normocephalic, without obvious abnormality, atraumatic. ENT:  Nares normal. No drainage. Lungs:   Clear to auscultation bilaterally. No Wheezing or Rhonchi. No rales. Heart:   Regular rate and rhythm,  no murmur, rub or gallop. No JVD. Abdomen:   Soft, non-tender. Not distended. Bowel sounds normal.   MSK:  No edema. Bilateral AKA. Skin:     Scrotal abrasions/wounds. Penile discharge/wound. No Jaundice. Neurologic: Alert and oriented x 3, slow to respond. No focal deficits   Psychiatry:      No depression/anxiety. Mood congruent for context. Heme/Lymph/Immune: No echymoses or overt signs of bleeding.     Data Review:   Recent Results (from the past 24 hour(s))   CBC WITH AUTOMATED DIFF    Collection Time: 17  1:07 PM   Result Value Ref Range    WBC 18.2 (H) 4.3 - 11.1 K/uL    RBC 4.04 (L) 4.23 - 5.67 M/uL    HGB 11.1 (L) 13.6 - 17.2 g/dL    HCT 33.4 (L) 41.1 - 50.3 %    MCV 82.7 79.6 - 97.8 FL    MCH 27.5 26.1 - 32.9 PG    MCHC 33.2 31.4 - 35.0 g/dL    RDW 18.7 (H) 11.9 - 14.6 %    PLATELET 376 (H) 629 - 450 K/uL    MPV 10.2 (L) 10.8 - 14.1 FL    DF AUTOMATED      NEUTROPHILS 76 43 - 78 %    LYMPHOCYTES 14 13 - 44 %    MONOCYTES 8 4.0 - 12.0 %    EOSINOPHILS 1 0.5 - 7.8 %    BASOPHILS 0 0.0 - 2.0 %    IMMATURE GRANULOCYTES 0.6 0.0 - 5.0 %    ABS. NEUTROPHILS 13.9 (H) 1.7 - 8.2 K/UL    ABS. LYMPHOCYTES 2.6 0.5 - 4.6 K/UL    ABS. MONOCYTES 1.4 (H) 0.1 - 1.3 K/UL    ABS. EOSINOPHILS 0.2 0.0 - 0.8 K/UL    ABS. BASOPHILS 0.0 0.0 - 0.2 K/UL    ABS. IMM. GRANS. 0.1 0.0 - 0.5 K/UL   URINALYSIS W/ RFLX MICROSCOPIC    Collection Time: 07/21/17  2:50 PM   Result Value Ref Range    Color YELLOW      Appearance CLEAR      Specific gravity 1.020 1.001 - 1.023      pH (UA) 6.0 5.0 - 9.0      Protein 100 (A) NEG mg/dL    Glucose >1000 mg/dL    Ketone TRACE (A) NEG mg/dL    Bilirubin SMALL AMOUNT (A) NEG      Blood MODERATE (A) NEG      Urobilinogen 1.0 0.2 - 1.0 EU/dL    Nitrites POSITIVE (A) NEG      Leukocyte Esterase MODERATE (A) NEG     URINE MICROSCOPIC    Collection Time: 07/21/17  2:50 PM   Result Value Ref Range    WBC >100 0 /hpf    RBC 20-50 0 /hpf    Epithelial cells 0-3 0 /hpf    Bacteria 4+ (H) 0 /hpf    Casts 0 0 /lpf    Crystals, urine 0 0 /LPF    Mucus 0 0 /lpf    Other observations RESULTS VERIFIED MANUALLY     METABOLIC PANEL, COMPREHENSIVE    Collection Time: 07/21/17  4:55 PM   Result Value Ref Range    Sodium 130 (L) 136 - 145 mmol/L    Potassium 5.5 (H) 3.5 - 5.1 mmol/L    Chloride 96 (L) 98 - 107 mmol/L    CO2 26 21 - 32 mmol/L    Anion gap 8 7 - 16 mmol/L    Glucose 359 (H) 65 - 100 mg/dL    BUN 33 (H) 8 - 23 MG/DL    Creatinine 0.98 0.8 - 1.5 MG/DL    GFR est AA >60 >60 ml/min/1.73m2    GFR est non-AA >60 >60 ml/min/1.73m2    Calcium 8.5 8.3 - 10.4 MG/DL    Bilirubin, total 0.4 0.2 - 1.1 MG/DL    ALT (SGPT) 12 12 - 65 U/L    AST (SGOT) 50 (H) 15 - 37 U/L    Alk. phosphatase 115 50 - 136 U/L    Protein, total 7.9 6.3 - 8.2 g/dL    Albumin 1.8 (L) 3.2 - 4.6 g/dL    Globulin 6.1 (H) 2.3 - 3.5 g/dL    A-G Ratio 0.3 (L) 1.2 - 3.5         Imaging /Procedures /Studies    Chest X-Ray  Interstitial prominence in both lungs is stable and appears chronic. There is no developing acute infiltrate.   The heart size is normal.  The bony  thorax is intact. Assessment and Plan: Active Hospital Problems    Diagnosis Date Noted    Acute prerenal failure ([facility] 07/21/2017    Acute hyperkalemia 07/21/2017    Acute hyponatremia 07/21/2017    UTI (urinary tract infection) 12/30/2014    Dementia 12/30/2014    Underweight 12/30/2014    Leukocytosis 12/05/2014     No obvious source of       Dehydration 12/05/2014    S/P AKA (above knee amputation) bilateral ([facility] 12/05/2014     Due to PVD, diabetes etc      Dyslipidemia 11/15/2012    DM (diabetes mellitus) type II uncontrolled, periph vascular disorder ([facility] 11/14/2012    Medical non-compliance 06/25/2012    Tobacco abuse 10/05/2009     QUIT          PLAN  - Admit to medical floor for UTI with leukocytosis, lethargy & fatigue, prerenal azotemia and hyperkalemia from dehydration, and scrotal/penile wounds  - Continue Ceftriaxone 1g Q24H - started in ER  - Start normal saline @ 75 ml/hr - hold Lasix - EF 35% so monitor closely  - Strict I/O  - Wound care to assess penile/scrotal wounds - may need urology eval in AM  - Repeat CBC/BMP daily for WBC, BUN/Cr, & potassium  - Hold Lisinopril & Spironolactone  - Continue home Coreg, Lipitor, ASA  - Continue Vimpat & Keppra  - Continue Lantus 20U QAM. Add Humalog SSI. - PT eval. Place PPD.     Code Status: FULL CODE  DVT Prophylaxis: Heparin    Anticipated discharge: 4 days    Brenda Townsend DO  6:25 PM

## 2017-07-21 NOTE — PROGRESS NOTES
TRANSFER - IN REPORT:    Verbal report received from [address] RN(name) on [employer]  being received from ED(unit) for routine progression of care      Report consisted of patients Situation, Background, Assessment and   Recommendations(SBAR). Information from the following report(s) SBAR, Kardex, ED Summary, Intake/Output and Recent Results was reviewed with the receiving nurse. Opportunity for questions and clarification was provided. Assessment completed upon patients arrival to unit and care assumed. Skin assessment completed by this RN and Luca Needs. Open wound and swelling to scrotum and penis. Bilateral AKA. Scattered scars. No other skin issues noted at this time.

## 2017-07-21 NOTE — ED PROVIDER NOTES
HPI Comments: History is obtained from the patient's wife. She states that he has several issues going on. She states he has had pain and swelling from his penis. He was recently admitted to the hospital and had a Alas catheter in place. She states that he has had swelling and drainage from his penis over the past several days. He also has  A sacral decubitus ulcer. He was admitted to the hospital and went to rehabilitation and she states that it was doing better however has been worsening over the past several days. He also has been generally feeling weak. Elements of this note were created using speech recognition software. As such, errors of speech recognition may be present. Patient is a 58 y.o. male presenting with skin problem. The history is provided by the spouse. Skin Problem           Past Medical History:   Diagnosis Date    Alcohol abuse, in remission 6/13/2017    Arthritis     CAD (coronary artery disease) 2008    MI 2006 with stent, stent 2008    Chronic hyponatremia 3/3/2014    Chronic pain     generalized    Dyslipidemia     GERD (gastroesophageal reflux disease)     Hypercholesterolemia     Hypertension     under control with med    Insulin dependent diabetes mellitus ([address] type 2 since 2005    insulin reliant.  bs: \"120'S\" BUT RUNS HIGH    Medical non-compliance 6/25/2012    MI (myocardial infarction) ([address]  2008    EF 55%    Other postprocedural status 1/2/2014 07/03/2013: S/P Right common femoral endarterectomy     PAD (peripheral artery disease) ([address] 10/5/2009    10/24/12 Graft thrombectomy through leg incision of left iliac  to popliteal bypass and left popliteal to posterior tibial bypass -Dr. Gabino Armenta  10/15/09 Left external Iliac artery to above the knee popliteal artery bypass graft propaten PTFE, stenting L EIA- Dr. Gabino Armenta 10/5/09 Open amputation left 2nd, 3rd toes- Dr. Silvino De La Cruz 11/6/08 Left common femoral, superficial femoral and profunda femoris art    PUD (peptic ulcer disease)     PVD (peripheral [facility] disease) [facility]     Seizures ([facility]     x1 about 2010?  Sepsis ([facility] 2/26/2014    Thrombosis of Left External Iliac to above knee popliteal artery bypass graft 9/23/2010    Tobacco abuse 10/5/2009       Past Surgical History:   Procedure Laterality Date    AMPUTATION TOE,I-P JT  2008    3 toes on left foot    BYPASS GRAFT OTHR,ILIOFEMORAL  2009    left     CARDIAC SURG PROCEDURE UNLIST  2009    2-3 stents    HX ABOVE KNEE AMPUTATION Right 2014    HX AMPUTATION Left 11/14/2012    Left leg above the knee amputation     HX FEMORAL BYPASS  8/29/12    OCCLUDED GRAFT    HX THROMBECTOMY  7/02/12    Graft thrombectomy, left common iliac artery angioplasty and stent, left popliteal artery balloon angioplasty    HX THROMBECTOMY  10/22/2012    Graft thrombectomy through leg incision of left iliac to popliteal bypass and left popliteal to posterior tibial bypass    VASCULAR SURGERY PROCEDURE UNLIST  07/03/2013    R common femoral endarterectomy, re-do groin dissection    VASCULAR SURGERY PROCEDURE UNLIST  06/13/2011    R common femoral endarterectomy, left common and external iliac artery balloon angioplasty,  selective catheterization of left external iliac artery from right common femoral artery    VASCULAR SURGERY PROCEDURE UNLIST  06/25/2012    L Femoral-popliteal graft thrombectomy         Family History:   Problem Relation Age of Onset    Stroke Mother     Hypertension Mother     Diabetes Other        Social History     Social History    Marital status: LEGALLY      Spouse name: N/A    Number of children: N/A    Years of education: N/A     Occupational History    Not on file.      Social History Main Topics    Smoking status: Current Every Day Smoker     Packs/day: 0.25     Years: 46.00    Smokeless tobacco: Never Used    Alcohol use 2.0 oz/week     4 Standard drinks or equivalent per week    Drug use: Yes     Special: Marijuana Comment: no longer uses    Sexual activity: Not on file     Other Topics Concern    Not on file     Social History Narrative         ALLERGIES: Contrast agent [iodine] and Restoril [temazepam]    Review of Systems   Constitutional: Positive for fatigue. Negative for chills and fever. Gastrointestinal: Negative for nausea and vomiting. All other systems reviewed and are negative. Vitals:    07/21/17 1300   BP: 112/63   Pulse: 90   Resp: 18   Temp: 97.3 °F (36.3 °C)   SpO2: 98%   Weight: 49.9 kg (110 lb)            Physical Exam   Constitutional: He appears well-developed and well-nourished. HENT:   Head: Normocephalic and atraumatic. Eyes: Conjunctivae are normal. Pupils are equal, round, and reactive to light. Neck: Normal range of motion. Neck supple. Cardiovascular: Normal rate and regular rhythm. Abdominal: Soft. Bowel sounds are normal.   Genitourinary:   Genitourinary Comments: Uncircumcised, foreskin is moderately thickened with a yellow discharge. Right scrotum has a quarter-sized area of skin breakdown with no induration or cellulitic changes   Musculoskeletal: He exhibits no edema or tenderness. bilat AKAs   Skin: Skin is warm and dry. Psychiatric: His behavior is normal.   Nursing note and vitals reviewed. MDM  Number of Diagnoses or Management Options  Acute cystitis with hematuria: new and requires workup  Debility: established and worsening  Dementia without behavioral disturbance, unspecified dementia type:   DM (diabetes mellitus) type II uncontrolled, periph vascular disorder ([facility]): established and worsening  Essential hypertension:   S/P AKA (above knee amputation) bilateral [facility]):   Diagnosis management comments: Differential diagnosis: Sepsis, infected sacral decubitus ulcer, bowel syndrome, anemia, dehydration, electrolyte abnormality  5:53 PM discussed results with wife and patient, need for admission for his severe urinary tract infection for IV antibiotics. Spoke with hospitalist, to see the patient for admission       Amount and/or Complexity of Data Reviewed  Clinical lab tests: ordered and reviewed  Tests in the radiology section of CPT®: ordered and reviewed  Discuss the patient with other providers: yes  Independent visualization of images, tracings, or specimens: yes    Risk of Complications, Morbidity, and/or Mortality  Presenting problems: high  Diagnostic procedures: moderate  Management options: high      ED Course       Procedures

## 2017-07-21 NOTE — ED TRIAGE NOTES
Per family member patient's has swelling to penis and drainage. Patient was sent her for an infection to his penis.

## 2017-07-21 NOTE — PROGRESS NOTES
CM reviewed record, CM attempted to reach patient and or caregiver. Granddaughter answered and said Terry Romero was with the patient right now, then the grandson answered the phone, CM attempted to ask for Terry Romero, but the grandson ended the call. CM will attempt at a later date      This note will not be viewable in [address]

## 2017-07-21 NOTE — ED NOTES
TRANSFER - OUT REPORT:    Verbal report given to Jose Holguin RN on [employer]  being transferred to 7th floor for routine progression of care       Report consisted of patients Situation, Background, Assessment and   Recommendations(SBAR). Information from the following report(s) SBAR, ED Summary and MAR was reviewed with the receiving nurse. Lines:   Peripheral IV 07/21/17 Right Forearm (Active)   Site Assessment Clean, dry, & intact 7/21/2017  6:42 PM   Phlebitis Assessment 0 7/21/2017  6:42 PM   Infiltration Assessment 0 7/21/2017  6:42 PM        Opportunity for questions and clarification was provided.

## 2017-07-22 NOTE — PROGRESS NOTES
Problem: Mobility Impaired (Adult and Pediatric)  Goal: *Acute Goals and Plan of Care (Insert Text)    LTG:  (1.)Mr. Montez will move from supine to sit and sit to supine , scoot up and down and roll side to side in bed with INDEPENDENT within 5 day(s). (2.)Mr. Montez will transfer from bed to chair and chair to bed with STAND BY ASSIST using the least restrictive device within 5 day(s). (3.)Mr. Montez will demonstrate w/c mobility with STAND BY ASSIST for 500+ feet with the least restrictive device within 5 day(s). ________________________________________________________________________________________________      PHYSICAL THERAPY: INITIAL ASSESSMENT, TREATMENT DAY: DAY OF ASSESSMENT, PM 7/22/2017  INPATIENT: Hospital Day: 2  [place] / [place] / Product Type: Medicare /      NAME/AGE/GENDER: Pedro Martínez is a 58 y.o. male   PRIMARY DIAGNOSIS: Acute prerenal failure (HCC)  UTI (urinary tract infection)  Leukocytosis UTI (urinary tract infection) UTI (urinary tract infection)        ICD-10: Treatment Diagnosis:       · Generalized Muscle Weakness (M62.81)  · Other lack of cordination (R27.8)   Precaution/Allergies:  Contrast agent [iodine] and Restoril [temazepam]       ASSESSMENT:      Mr. Pascual Sagastume presents as bilat AKA in supine at arrival. He states he has bilat prosthetics at home and only uses them about every 2 weeks. Other than that he uses w/c. His impairments include decreased functional mobility, decreased balance, decreased strength, decreased safety awareness, increased risk for falls. Pt would benefit from further PT while in house to address these impairments to help improve to prior level of independence. Unable to do w/c trnf today due to safety. Anticipate discharge home when medically cleared. This section established at most recent assessment   PROBLEM LIST (Impairments causing functional limitations):  1. Decreased Strength  2.  Decreased Transfer Abilities  3. Decreased Balance  4. Decreased Activity Tolerance    INTERVENTIONS PLANNED: (Benefits and precautions of physical therapy have been discussed with the patient.)  1. Balance Exercise  2. Gait Training  3. Therapeutic Activites  4. Transfer Training      TREATMENT PLAN: Frequency/Duration: 3 times a week for duration of hospital stay  Rehabilitation Potential For Stated Goals: FAIR      RECOMMENDED REHABILITATION/EQUIPMENT: (at time of discharge pending progress): Continue Skilled Therapy. HISTORY:   History of Present Injury/Illness (Reason for Referral):  58year old underweight AAM with a PMH of PAD s/p bilateral AKA, CAD, chronic HFREF (EF 35%), IDDM2, CAD, and history of tobacco and alcohol abuse presented to the ER from home with progressive lethargy, fatigue, mild confusion, sacral pain, flank pain, and suprapubic pain. Per his wife, the patient was at [facility] until two weeks ago and \"was doing fine\", but 2 days after returning home he \"just wasn't the same\" and has been progressively becoming weaker and more lethargic over those two weeks. He was discharged on 7/2/17 from [facility] to [facility] with a Alas catheter. He kept this until his last day at rehab where they removed it just prior to being discharged. Past Medical History/Comorbidities:   Mr. Brooklyn Rogel  has a past medical history of Alcohol abuse, in remission (6/13/2017); Arthritis; CAD (coronary artery disease) (2008); Chronic hyponatremia (3/3/2014); Chronic pain; Dyslipidemia; GERD (gastroesophageal reflux disease); Hypercholesterolemia; Hypertension; Insulin dependent diabetes mellitus ([address] (type 2 since 2005); Medical non-compliance (6/25/2012); MI (myocardial infarction) ([address] ( 2008); Other postprocedural status (1/2/2014); PAD (peripheral artery disease) ([address] (10/5/2009); PUD (peptic ulcer disease); PVD (peripheral vascular disease) ([address] Seizures ([address]  Sepsis ([address] (2/26/2014); 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 Mr. Denny Leone  has a past surgical history that includes amputation toe,i-p jt (2008); cardiac surg procedure unlist (2009); thrombectomy (7/02/12); femoral bypass (8/29/12); amputation (Left, 11/14/2012); thrombectomy (10/22/2012); bypass graft othr,iliofemoral (2009); vascular surgery procedure unlist (07/03/2013); vascular surgery procedure unlist (06/13/2011); vascular surgery procedure unlist (06/25/2012); and above knee amputation (Right, 2014). Social History/Living Environment:   Home Environment: Private residence  One/Two Story Residence: One story  Living Alone: No  Support Systems: Family member(s), Friends \ neighbors  Patient Expects to be Discharged to[de-identified] Private residence  Current DME Used/Available at Home: Commode, bedside, Wheelchair  Prior Level of Function/Work/Activity:  bilat amputee, has prosthetic, but does not use. Mainly uses w/c      Number of Personal Factors/Comorbidities that affect the Plan of Care: 1-2: MODERATE COMPLEXITY   EXAMINATION:   Most Recent Physical Functioning:   Gross Assessment:  AROM: Generally decreased, functional  Strength: Grossly decreased, non-functional  Coordination: Generally decreased, functional  Tone: Normal  Sensation: Intact               Posture:  Posture (WDL): Exceptions to WDL  Posture Assessment:  (bilat AKA)  Balance:    Bed Mobility:  Rolling: Stand-by asssistance  Supine to Sit: Stand-by asssistance  Sit to Supine: Stand-by asssistance  Wheelchair Mobility:     Transfers:     Gait:             Body Structures Involved:  1. Nerves  2. Bones  3. Joints  4. Muscles Body Functions Affected:  1. Mental  2. Neuromusculoskeletal  3. Movement Related  4. Skin Related Activities and Participation Affected:  1. Learning and Applying Knowledge  2. General Tasks and Demands  3. Communication  4. Mobility  5. Self Care  6.  Domestic Life Number of elements that affect the Plan of Care: 3: MODERATE COMPLEXITY   CLINICAL PRESENTATION:   Presentation: Evolving clinical presentation with changing clinical characteristics: MODERATE COMPLEXITY   CLINICAL DECISION MAKIN [facility] Inpatient Short Form  How much difficulty does the patient currently have. .. Unable A Lot A Little None   1. Turning over in bed (including adjusting bedclothes, sheets and blankets)? [ ] 1   [ ] 2   [X] 3   [ ] 4   2. Sitting down on and standing up from a chair with arms ( e.g., wheelchair, bedside commode, etc.)   [X] 1   [ ] 2   [ ] 3   [ ] 4   3. Moving from lying on back to sitting on the side of the bed? [ ] 1   [ ] 2   [X] 3   [ ] 4   How much help from another person does the patient currently need. .. Total A Lot A Little None   4. Moving to and from a bed to a chair (including a wheelchair)? [ ] 1   [ ] 2   [X] 3   [ ] 4   5. Need to walk in hospital room? [ ] 1   [ ] 2   [X] 3   [ ] 4   6. Climbing 3-5 steps with a railing? [X] 1   [ ] 2   [ ] 3   [ ] 4   © , Trustees of [address] [place] [address] Box [facility], under license to [employer]. All rights reserved    Score:  Initial: 14 Most Recent: X (Date: -- )     Interpretation of Tool:  Represents activities that are increasingly more difficult (i.e. Bed mobility, Transfers, Gait). Score 24 23 22-20 19-15 14-10 9-7 6       Modifier CH CI CJ CK CL CM CN         · Mobility - Walking and Moving Around:               - CURRENT STATUS:    CL - 60%-79% impaired, limited or restricted               - GOAL STATUS:           CK - 40%-59% impaired, limited or restricted               - D/C STATUS:                       ---------------To be determined---------------  [place] / Product Type: Medicare /       Medical Necessity:     · Skilled intervention continues to be required due to decreased mobility.   Reason for Services/Other Comments:  · Patient continues to require skilled intervention due to medical complications and patient unable to attend/participate in therapy as expected. Use of outcome tool(s) and clinical judgement create a POC that gives a: Questionable prediction of patient's progress: MODERATE COMPLEXITY                 TREATMENT:   (In addition to Assessment/Re-Assessment sessions the following treatments were rendered)   Pre-treatment Symptoms/Complaints:  I'm tired  Pain: Initial:   Pain Intensity 1: 0  Post Session:  none      Assessment/Reassessment only, no treatment provided today     Braces/Orthotics/Lines/Etc:   · O2 Device: Room air  Treatment/Session Assessment:    · Response to Treatment:  See above  · Interdisciplinary Collaboration:  · Physical Therapist  · Registered Nurse  · After treatment position/precautions:  · Supine in bed  · Call light within reach  · RN notified  · Compliance with Program/Exercises: Will assess as treatment progresses. · Recommendations/Intent for next treatment session: \"Next visit will focus on advancements to more challenging activities and reduction in assistance provided\".   Total Treatment Duration:  PT Patient Time In/Time Out  Time In: 1315  Time Out: 735 [facility], [facility]

## 2017-07-22 NOTE — PROGRESS NOTES
Hospitalist Progress Note    Subjective:   Daily Progress Note: 7/22/2017 2:05 PM    History:  Patient presented to ER 7/21 with progressive lethargy, confusion, fatigue, sacral pain, pain and swelling to penis with drainage and pyuria x 2 days with recent hospital admission with craven placed. Sacral decubitus ulcer and also reported generalized weakness . Found with sepsis, ? infected sacral ulcer, acute UTI. He was admitted here 6/27-7/2/17 for hypoglycemia in the 30's. Begun on vanco and zosyn in ER for LLL pneumonia. Prior to that was admitted for perirectal fistula given rocephin and IV cleocin, discharged on clinday and vantin to SNF on 6/16. Hypotension requiring pressors and fluids. Hgb dropped from 8.9 to 5.7. Positive heme stool, seen by GI, refused colonoscopy and egd. TTE with EF of 35% with hypokinesis. Seen by Cards and pulmonary. Begun on lasix, aldactone, coreg with Cardiology recommending outpatient follow up and possible ischemic workup if patient agrees. He pulled craven out and began having blood with clots urology was consulted and put him on CBI. He was discharged to home with home health. Was at [facility] 2 weeks ago doing well, wife reported progressive worsening since discharge from there. Craven was removed just prior to rehab discharge. Rocephin begun in ER. Cultures pending. Normal saline at 75 ml/hr, holding lasix. Wound care to evaluate. Holding lisinopril and spironolactone. Adding humalog SSI to lantus 20 units q am.      Today:  Chronically and acutely ill appearing. Frail, extremely poor body hygiene and very foul odor. Incontinent of urine and stool. Complains of pain all over.   Glucose remains high    PMH:  Alcohol abuse in remission(6/13), CAD with MI, Stent x2 , EF:  35%, chronic hyponatremia, chronic pain, dyslipidemia, GERD, long term ongoing tobacco abuse, hypertension, IDDM, medical noncompliance, right common femoral endarterectomy, PAD with grafts, amputation left 2 and 3rd toes, PVD, seizure x 1 in 2010,  Iliofemoral left bypass graft with occlusion, bilateral AKA, multiple thromectomies. Current Facility-Administered Medications   Medication Dose Route Frequency    PPD Read Reminder  1 Each Other Q24H    aspirin delayed-release tablet 81 mg  81 mg Oral DAILY    atorvastatin (LIPITOR) tablet 80 mg  80 mg Oral DAILY    carvedilol (COREG) tablet 6.25 mg  6.25 mg Oral BID WITH MEALS    gabapentin (NEURONTIN) capsule 600 mg  600 mg Oral TID    HYDROcodone-acetaminophen (NORCO) 5-325 mg per tablet 1 Tab  1 Tab Oral Q6H PRN    insulin glargine (LANTUS) injection 20 Units  20 Units SubCUTAneous 7am    lacosamide (VIMPAT) tablet 100 mg  100 mg Oral BID    levETIRAcetam (KEPPRA) tablet 500 mg  500 mg Oral BID    nicotine (NICODERM CQ) 14 mg/24 hr patch 1 Patch  1 Patch TransDERmal DAILY    famotidine (PEPCID) tablet 20 mg  20 mg Oral BID    sodium chloride (NS) flush 5-10 mL  5-10 mL IntraVENous Q8H    sodium chloride (NS) flush 5-10 mL  5-10 mL IntraVENous PRN    heparin (porcine) injection 5,000 Units  5,000 Units SubCUTAneous Q8H    cefTRIAXone (ROCEPHIN) 1 g in 0.9% sodium chloride (MBP/ADV) 50 mL  1 g IntraVENous Q24H    0.9% sodium chloride infusion  75 mL/hr IntraVENous CONTINUOUS    insulin lispro (HUMALOG) injection   SubCUTAneous AC&HS        Review of Systems  A comprehensive review of systems was negative except for that written in the HPI. Patient is an extremely poor historian. Objective:     Visit Vitals    BP 99/58 (BP 1 Location: Right arm, BP Patient Position: At rest)    Pulse 77    Temp 97.4 °F (36.3 °C)    Resp 17    Wt 49.9 kg (110 lb)    SpO2 99%    BMI 46.01 kg/m2      O2 Device: Room air    Temp (24hrs), Av.5 °F (36.9 °C), Min:97.4 °F (36.3 °C), Max:99 °F (37.2 °C)          1901 -  0700  In: 2180 [P.O.:1680;  I.V.:500]  Out: 1100 [Urine:1100]    General appearance: Drowsy, but wakes to voice. Complains of pain all over. Malnutritioned, foul body odor. Head: Normocephalic, without obvious abnormality, atraumatic  Eyes: conjunctivae/corneas clear. PERRL  Neck: supple, symmetrical, trachea midline, no adenopathy, thyroid: not enlarged, symmetric, no tenderness/mass/nodules, no carotid bruit and no JVD  Lungs: clear to auscultation bilaterally  Heart: regular rate and rhythm, S1, S2 normal, no murmur, click, rub or gallop  Abdomen: soft, tender to touch in all lower quads. Did not view sacral decub. Bowel sounds normal. No masses,  no organomegaly  Extremities: extremities normal, atraumatic, no cyanosis or edema  Skin: Skin color, texture, turgor dull, dry. Lackluster. No rashes or lesions  Neurologic: Grossly normal    Additional comments:  Notes, orders, test results, vitals reviewed    Data Review    Recent Results (from the past 24 hour(s))   URINALYSIS W/ RFLX MICROSCOPIC    Collection Time: 07/21/17  2:50 PM   Result Value Ref Range    Color YELLOW      Appearance CLEAR      Specific gravity 1.020 1.001 - 1.023      pH (UA) 6.0 5.0 - 9.0      Protein 100 (A) NEG mg/dL    Glucose >1000 mg/dL    Ketone TRACE (A) NEG mg/dL    Bilirubin SMALL AMOUNT (A) NEG      Blood MODERATE (A) NEG      Urobilinogen 1.0 0.2 - 1.0 EU/dL    Nitrites POSITIVE (A) NEG      Leukocyte Esterase MODERATE (A) NEG     URINE MICROSCOPIC    Collection Time: 07/21/17  2:50 PM   Result Value Ref Range    WBC >100 0 /hpf    RBC 20-50 0 /hpf    Epithelial cells 0-3 0 /hpf    Bacteria 4+ (H) 0 /hpf    Casts 0 0 /lpf    Crystals, urine 0 0 /LPF    Mucus 0 0 /lpf    Other observations RESULTS VERIFIED MANUALLY     CULTURE, URINE    Collection Time: 07/21/17  2:50 PM   Result Value Ref Range    Special Requests: NO SPECIAL REQUESTS      Culture result:        NO GROWTH AFTER SHORT PERIOD OF INCUBATION. FURTHER RESULTS TO FOLLOW AFTER OVERNIGHT INCUBATION.    METABOLIC PANEL, COMPREHENSIVE    Collection Time: 07/21/17  4:55 PM Result Value Ref Range    Sodium 130 (L) 136 - 145 mmol/L    Potassium 5.5 (H) 3.5 - 5.1 mmol/L    Chloride 96 (L) 98 - 107 mmol/L    CO2 26 21 - 32 mmol/L    Anion gap 8 7 - 16 mmol/L    Glucose 359 (H) 65 - 100 mg/dL    BUN 33 (H) 8 - 23 MG/DL    Creatinine 0.98 0.8 - 1.5 MG/DL    GFR est AA >60 >60 ml/min/1.73m2    GFR est non-AA >60 >60 ml/min/1.73m2    Calcium 8.5 8.3 - 10.4 MG/DL    Bilirubin, total 0.4 0.2 - 1.1 MG/DL    ALT (SGPT) 12 12 - 65 U/L    AST (SGOT) 50 (H) 15 - 37 U/L    Alk. phosphatase 115 50 - 136 U/L    Protein, total 7.9 6.3 - 8.2 g/dL    Albumin 1.8 (L) 3.2 - 4.6 g/dL    Globulin 6.1 (H) 2.3 - 3.5 g/dL    A-G Ratio 0.3 (L) 1.2 - 3.5     GLUCOSE, POC    Collection Time: 07/21/17 10:12 PM   Result Value Ref Range    Glucose (POC) 359 (H) 65 - 010 mg/dL   METABOLIC PANEL, BASIC    Collection Time: 07/22/17  6:20 AM   Result Value Ref Range    Sodium 134 (L) 136 - 145 mmol/L    Potassium 4.3 3.5 - 5.1 mmol/L    Chloride 98 98 - 107 mmol/L    CO2 25 21 - 32 mmol/L    Anion gap 11 7 - 16 mmol/L    Glucose 123 (H) 65 - 100 mg/dL    BUN 27 (H) 8 - 23 MG/DL    Creatinine 0.62 (L) 0.8 - 1.5 MG/DL    GFR est AA >60 >60 ml/min/1.73m2    GFR est non-AA >60 >60 ml/min/1.73m2    Calcium 8.5 8.3 - 10.4 MG/DL   CBC WITH AUTOMATED DIFF    Collection Time: 07/22/17  6:20 AM   Result Value Ref Range    WBC 14.5 (H) 4.3 - 11.1 K/uL    RBC 3.64 (L) 4.23 - 5.67 M/uL    HGB 9.8 (L) 13.6 - 17.2 g/dL    HCT 29.9 (L) 41.1 - 50.3 %    MCV 82.1 79.6 - 97.8 FL    MCH 26.9 26.1 - 32.9 PG    MCHC 32.8 31.4 - 35.0 g/dL    RDW 18.1 (H) 11.9 - 14.6 %    PLATELET 307 206 - 599 K/uL    MPV 9.9 (L) 10.8 - 14.1 FL    DF AUTOMATED      NEUTROPHILS 66 43 - 78 %    LYMPHOCYTES 23 13 - 44 %    MONOCYTES 9 4.0 - 12.0 %    EOSINOPHILS 2 0.5 - 7.8 %    BASOPHILS 0 0.0 - 2.0 %    IMMATURE GRANULOCYTES 0.3 0.0 - 5.0 %    ABS. NEUTROPHILS 9.6 (H) 1.7 - 8.2 K/UL    ABS. LYMPHOCYTES 3.3 0.5 - 4.6 K/UL    ABS.  MONOCYTES 1.3 0.1 - 1.3 K/UL ABS. EOSINOPHILS 0.2 0.0 - 0.8 K/UL    ABS. BASOPHILS 0.0 0.0 - 0.2 K/UL    ABS. IMM. GRANS. 0.0 0.0 - 0.5 K/UL   GLUCOSE, POC    Collection Time: 07/22/17  7:32 AM   Result Value Ref Range    Glucose (POC) 148 (H) 65 - 100 mg/dL   GLUCOSE, POC    Collection Time: 07/22/17 11:26 AM   Result Value Ref Range    Glucose (POC) 224 (H) 65 - 100 mg/dL     CXR; Interstitial prominence in both lungs is stable and appears chronic. No developing acute infiltrate. The heart size is normal.  The bony thorax is intact.       Assessment/Plan:     Principal Problem:    UTI (urinary tract infection) (12/30/2014)    Active Problems:    Tobacco abuse (10/5/2009)      Overview: QUIT       Medical non-compliance (6/25/2012)      DM (diabetes mellitus) type II uncontrolled, periph vascular disorder ([facility] (11/14/2012)      Dyslipidemia (11/15/2012)      S/P AKA (above knee amputation) bilateral ([facility] (12/5/2014)      Overview: Due to PVD, diabetes etc      Dehydration (12/5/2014)      Leukocytosis (12/5/2014)      Overview: No obvious source of       Dementia (12/30/2014)      Underweight (12/30/2014)      Acute prerenal failure ([facility] (7/21/2017)      Acute hyperkalemia (7/21/2017)      Acute hyponatremia (7/21/2017)      PLAN:  Serial labs  Treat electrolyte abnormalities  Continue rocephin    Care Plan discussed with: Patient, RN    Signed By: William Mary NP     July 22, 2017

## 2017-07-23 NOTE — PROGRESS NOTES
Hospitalist Progress Note    Subjective:   Daily Progress Note: 7/23/2017 1:54 PM    History:  Patient presented to ER 7/21 with progressive lethargy, confusion, fatigue, sacral pain, pain and swelling to penis with drainage and pyuria x 2 days with recent hospital admission with craven placed. Craven removed per Rehab on day prior to discharge. Sacral decubitus ulcer and also reported generalized weakness. Found with sepsis, ? infected sacral ulcer, acute UTI. He was admitted here 6/27-7/2/17 for hypoglycemia in the 30's. Begun on vanco and zosyn in ER for LLL pneumonia. Prior to that was admitted for perirectal fistula given rocephin and IV cleocin, discharged on clinday and vantin to SNF on 6/16. Hypotension requiring pressors and fluids. Hgb dropped from 8.9 to 5.7. Positive heme stool, seen by GI, refused colonoscopy and egd. TTE with EF of 35% with hypokinesis. Seen by Cards and pulmonary. Begun on lasix, aldactone, coreg with Cardiology recommending outpatient follow up and possible ischemic workup if patient agrees. He pulled craven out and began having blood with clots urology was consulted and put him on CBI. He was discharged to home with home health. Was at [facility] 2 weeks ago doing well, wife reported progressive worsening since discharge from there. Craven was removed just prior to rehab discharge. Rocephin begun in ER. Cultures pending. Normal saline at 75 ml/hr, holding lasix. Wound care to evaluate. Holding lisinopril and spironolactone. Adding humalog SSI to lantus 20 units q am.      7/22:  Drowsy, not communicating much other than to say he hurts all over. Chronically and acutely ill appearing. Extremely poor hygiene. Very foul smelling urine and body odor. Frail. Incontinent of urine and stool. Today:  Patient curled up in fetal position on my arrival.  Had to repeatedly call patient's name to get him awake. Only reports he doesn't feel good.   Unable or unwilling to give specifics. Sleepy. Urine culture currently growing >100,000 colonies/ml Gram negative rods with ID and susceptibility to follow. Lactic acid done at 0330 1.4. Not participating in care. No family here today or yesterday. Urine culture in June of this year grew >100,000 colonies/ml Candida Glabrata. A1C June of this year:  11.8. PMH:  Alcohol abuse in remission(6/13), CAD with MI, Stent x2 , EF:  35%, chronic hyponatremia, chronic pain, dyslipidemia, GERD, long term ongoing tobacco abuse, hypertension, IDDM, medical noncompliance, right common femoral endarterectomy, PAD with grafts, amputation left 2 and 3rd toes, PVD, seizure x 1 in 2010,  Iliofemoral left bypass graft with occlusion, bilateral AKA, multiple thromectomies.     Current Facility-Administered Medications   Medication Dose Route Frequency    acetaminophen (TYLENOL) tablet 650 mg  650 mg Oral Q6H PRN    PPD Read Reminder  1 Each Other Q24H    aspirin delayed-release tablet 81 mg  81 mg Oral DAILY    atorvastatin (LIPITOR) tablet 80 mg  80 mg Oral DAILY    carvedilol (COREG) tablet 6.25 mg  6.25 mg Oral BID WITH MEALS    gabapentin (NEURONTIN) capsule 600 mg  600 mg Oral TID    HYDROcodone-acetaminophen (NORCO) 5-325 mg per tablet 1 Tab  1 Tab Oral Q6H PRN    insulin glargine (LANTUS) injection 20 Units  20 Units SubCUTAneous 7am    lacosamide (VIMPAT) tablet 100 mg  100 mg Oral BID    levETIRAcetam (KEPPRA) tablet 500 mg  500 mg Oral BID    nicotine (NICODERM CQ) 14 mg/24 hr patch 1 Patch  1 Patch TransDERmal DAILY    famotidine (PEPCID) tablet 20 mg  20 mg Oral BID    sodium chloride (NS) flush 5-10 mL  5-10 mL IntraVENous Q8H    sodium chloride (NS) flush 5-10 mL  5-10 mL IntraVENous PRN    heparin (porcine) injection 5,000 Units  5,000 Units SubCUTAneous Q8H    cefTRIAXone (ROCEPHIN) 1 g in 0.9% sodium chloride (MBP/ADV) 50 mL  1 g IntraVENous Q24H    0.9% sodium chloride infusion  75 mL/hr IntraVENous CONTINUOUS    insulin lispro (HUMALOG) injection   SubCUTAneous AC&HS        Review of Systems  Patient is unable or unwilling. Unclear what baseline cognition and mentation are. Objective:     Visit Vitals    /50 (BP 1 Location: Right arm, BP Patient Position: At rest)    Pulse 87    Temp 98.7 °F (37.1 °C)    Resp 18    Wt 44 kg (97 lb 1.6 oz)    SpO2 94%    BMI 40.61 kg/m2      O2 Device: Room air    Temp (24hrs), Av °F (37.2 °C), Min:97.8 °F (36.6 °C), Max:100.9 °F (38.3 °C)        190 -  0700  In: 2180 [P.O.:1680; I.V.:500]  Out: 1100 [Urine:1100]      General appearance: Drowsy, but wakes to voice. Complains of pain all over. Malnutritioned, foul body odor. Head: Normocephalic, without obvious abnormality, atraumatic  Eyes: conjunctivae/corneas clear. PERRL  Neck: supple, symmetrical, trachea midline, no adenopathy, thyroid: not enlarged, symmetric, no tenderness/mass/nodules, no carotid bruit and no JVD  Lungs: clear to auscultation bilaterally  Heart: regular rate and rhythm, S1, S2 normal, no murmur, click, rub or gallop  Abdomen: soft, tender to touch in all lower quads. Did not view sacral decub. Bowel sounds normal. No masses,  no organomegaly  Extremities: extremities normal, atraumatic, no cyanosis or edema  Skin: Skin color, texture, turgor dull, dry. Lackluster.  No rashes or lesions  Neurologic: Grossly normal     Additional comments:  Notes, orders, test results, vitals reviewed    Data Review  Recent Results (from the past 24 hour(s))   GLUCOSE, POC    Collection Time: 17  3:50 PM   Result Value Ref Range    Glucose (POC) 245 (H) 65 - 100 mg/dL   GLUCOSE, POC    Collection Time: 17  9:22 PM   Result Value Ref Range    Glucose (POC) 265 (H) 65 - 100 mg/dL   PLEASE READ & DOCUMENT PPD TEST IN 24 HRS    Collection Time: 17 10:15 PM   Result Value Ref Range    PPD neg Negative    mm Induration 0 mm   LACTIC ACID    Collection Time: 17  3:30 AM   Result Value Ref Range    Lactic acid 1.4 0.4 - 2.0 MMOL/L   METABOLIC PANEL, BASIC    Collection Time: 07/23/17  5:20 AM   Result Value Ref Range    Sodium 131 (L) 136 - 145 mmol/L    Potassium 4.6 3.5 - 5.1 mmol/L    Chloride 98 98 - 107 mmol/L    CO2 23 21 - 32 mmol/L    Anion gap 10 7 - 16 mmol/L    Glucose 195 (H) 65 - 100 mg/dL    BUN 23 8 - 23 MG/DL    Creatinine 0.71 (L) 0.8 - 1.5 MG/DL    GFR est AA >60 >60 ml/min/1.73m2    GFR est non-AA >60 >60 ml/min/1.73m2    Calcium 8.1 (L) 8.3 - 10.4 MG/DL   CBC WITH AUTOMATED DIFF    Collection Time: 07/23/17  5:20 AM   Result Value Ref Range    WBC 14.4 (H) 4.3 - 11.1 K/uL    RBC 3.38 (L) 4.23 - 5.67 M/uL    HGB 9.2 (L) 13.6 - 17.2 g/dL    HCT 27.7 (L) 41.1 - 50.3 %    MCV 82.0 79.6 - 97.8 FL    MCH 27.2 26.1 - 32.9 PG    MCHC 33.2 31.4 - 35.0 g/dL    RDW 18.1 (H) 11.9 - 14.6 %    PLATELET 476 624 - 751 K/uL    MPV 9.4 (L) 10.8 - 14.1 FL    DF AUTOMATED      NEUTROPHILS 71 43 - 78 %    LYMPHOCYTES 19 13 - 44 %    MONOCYTES 8 4.0 - 12.0 %    EOSINOPHILS 2 0.5 - 7.8 %    BASOPHILS 0 0.0 - 2.0 %    IMMATURE GRANULOCYTES 0.3 0.0 - 5.0 %    ABS. NEUTROPHILS 10.2 (H) 1.7 - 8.2 K/UL    ABS. LYMPHOCYTES 2.7 0.5 - 4.6 K/UL    ABS. MONOCYTES 1.2 0.1 - 1.3 K/UL    ABS. EOSINOPHILS 0.2 0.0 - 0.8 K/UL    ABS. BASOPHILS 0.0 0.0 - 0.2 K/UL    ABS. IMM. GRANS. 0.1 0.0 - 0.5 K/UL   HEPATIC FUNCTION PANEL    Collection Time: 07/23/17  5:20 AM   Result Value Ref Range    Protein, total 7.0 6.3 - 8.2 g/dL    Albumin 1.5 (L) 3.2 - 4.6 g/dL    Globulin 5.5 (H) 2.3 - 3.5 g/dL    A-G Ratio 0.3 (L) 1.2 - 3.5      Bilirubin, total 0.2 0.2 - 1.1 MG/DL    Bilirubin, direct <0.1 <0.4 MG/DL    Alk.  phosphatase 106 50 - 136 U/L    AST (SGOT) 23 15 - 37 U/L    ALT (SGPT) 10 (L) 12 - 65 U/L   MAGNESIUM    Collection Time: 07/23/17  5:20 AM   Result Value Ref Range    Magnesium 2.1 1.8 - 2.4 mg/dL   GLUCOSE, POC    Collection Time: 07/23/17  7:24 AM   Result Value Ref Range    Glucose (POC) 189 (H) 65 - 100 mg/dL   GLUCOSE, POC    Collection Time: 07/23/17 10:43 AM   Result Value Ref Range    Glucose (POC) 196 (H) 65 - 100 mg/dL     CXR; Interstitial prominence in both lungs is stable and appears chronic. No developing acute infiltrate.   The heart size is normal.  The bony thorax is intact.         Assessment/Plan:   Acute on recurrent UTI   Long standing history of Tobacco abuse, now quit  Medical non-compliance, longstanding.    DM II poor control:  A1C:  11.8   Dyslipidemia   S/P bilateral AKA 2 to PVD, Uncontrolled diabetes c   Dehydration   failure to thrive  Leukocytosis     Dementia  Malnourished, protein calorie deficiency  Underweight   Acute prerenal failure   Acute hyperkalemia  Acute hyponatremia    PLAN:  RNs to call me when family arrives  Continue labs  Final urine culture pending  Wound care consult in  Continue rocephin, daily labs,    keppra level pending  May need ID tomorrow    Care Plan discussed with:   RN, Patient    Signed By: Marsha Pretty NP     July 23, 2017

## 2017-07-24 NOTE — PROGRESS NOTES
Hospitalist Progress Note    Subjective:   Daily Progress Note: 7/24/2017 12:28 PM    History:    Patient presented to ER 7/21 with progressive lethargy, confusion, fatigue, sacral pain, pain and swelling to penis with drainage and pyuria x 2 days with recent hospital admission with craven placed. Craven removed per Rehab on day prior to discharge. Sacral decubitus ulcer and also reported generalized weakness.  Found with sepsis, ? infected sacral ulcer, acute UTI.  He was admitted here 6/27-7/2/17 for hypoglycemia in the 30's.  Begun on vanco and zosyn in ER for LLL pneumonia.  Prior to that was admitted for perirectal fistula given rocephin and IV cleocin, discharged on clinday and vantin to SNF on 6/16.  Hypotension requiring pressors and fluids.  Hgb dropped from 8.9 to 5.7.   Positive heme stool, seen by GI, refused colonoscopy and egd.  TTE with EF of 35% with hypokinesis.  Seen by Cards and pulmonary.  Begun on lasix, aldactone, coreg with Cardiology recommending outpatient follow up and possible ischemic workup if patient agrees. [facility] pulled craven out and began having blood with clots urology was consulted and put him on Dario Tabby was discharged to home with home health.  Was at [facility] 2 weeks ago doing well, wife reported progressive worsening since discharge from there. Estela Chaffee was removed just prior to rehab discharge. Rocephin begun in ER.  Cultures pending.  Normal saline at 75 ml/hr, holding lasix.  Wound care to evaluate.  Holding lisinopril and spironolactone.  Adding humalog SSI to lantus 20 units q am.      7/22:  Drowsy, not communicating much other than to say he hurts all over. Chronically and acutely ill appearing. Extremely poor hygiene. Very foul smelling urine and body odor. Purulent drainage from uncircumcised penis, pyuria.  mild scrotal edema, erythema and painful to touch. No gross firmness of scrotum. Frail. Incontinent of urine and stool.   Chronically and acutely ill appearing.       7/23:  Patient curled up in fetal position on my arrival.  Had to repeatedly call patient's name to get him awake. Only reports he doesn't feel good. Unable or unwilling to give specifics. Sleepy. Urine culture currently growing >100,000 colonies/ml Gram negative rods with ID and susceptibility to follow. Lactic acid done at 0330 1.4. Not participating in care. No family here today or yesterday. Urine culture in June of this year grew >100,000 colonies/ml Candida Glabrata. A1C June of this year:  11.8.     Today: WBC up to 20.3 today despite rocephin administration x 3 days. Examined more thoroughly with wound care RN, Margie Jenkins. Minor pressure sores less than 2 cm left sacrum and underside of left scrotum. Foul smelling purulent drainage and pyuria from penis. Penis is extremely edematous, painful, warm and with approx 5 cm area of crepitus to underside and left lateral aspect of penis. More edematous than 2 days ago when I examined same. Does not have necrotic area at present. Wound culture, GC, Chamydia, wet prep ordered. Concern for evolving Zelalem's. RPR added to am labs. Spoke with Sajan Álvarez. He will see patient today. Urology consult ordered for today. Appetite picked up a little today. No nausea. No specific complaints today. Sleeping a lot. Patient reports remote history of treated gonorrhea. Denies history of syphilis. More alert today. PMH:  Alcohol abuse in remission(6/13), CAD with MI, Stent x2 , EF:  35%, chronic hyponatremia, chronic pain, dyslipidemia, GERD, long term ongoing tobacco abuse, hypertension, IDDM, medical noncompliance, right common femoral endarterectomy, PAD with grafts, amputation left 2 and 3rd toes, PVD, seizure x 1 in 2010,  Iliofemoral left bypass graft with occlusion, bilateral AKA, multiple thromectomies.       Current Facility-Administered Medications   Medication Dose Route Frequency    acetaminophen (TYLENOL) tablet 650 mg 650 mg Oral Q6H PRN    PPD Read Reminder  1 Each Other Q24H    aspirin delayed-release tablet 81 mg  81 mg Oral DAILY    atorvastatin (LIPITOR) tablet 80 mg  80 mg Oral DAILY    carvedilol (COREG) tablet 6.25 mg  6.25 mg Oral BID WITH MEALS    gabapentin (NEURONTIN) capsule 600 mg  600 mg Oral TID    HYDROcodone-acetaminophen (NORCO) 5-325 mg per tablet 1 Tab  1 Tab Oral Q6H PRN    insulin glargine (LANTUS) injection 20 Units  20 Units SubCUTAneous 7am    lacosamide (VIMPAT) tablet 100 mg  100 mg Oral BID    levETIRAcetam (KEPPRA) tablet 500 mg  500 mg Oral BID    nicotine (NICODERM CQ) 14 mg/24 hr patch 1 Patch  1 Patch TransDERmal DAILY    famotidine (PEPCID) tablet 20 mg  20 mg Oral BID    sodium chloride (NS) flush 5-10 mL  5-10 mL IntraVENous Q8H    sodium chloride (NS) flush 5-10 mL  5-10 mL IntraVENous PRN    heparin (porcine) injection 5,000 Units  5,000 Units SubCUTAneous Q8H    cefTRIAXone (ROCEPHIN) 1 g in 0.9% sodium chloride (MBP/ADV) 50 mL  1 g IntraVENous Q24H    0.9% sodium chloride infusion  75 mL/hr IntraVENous CONTINUOUS    insulin lispro (HUMALOG) injection   SubCUTAneous AC&HS        Review of Systems  A comprehensive review of systems was negative except for that written in the HPI. Objective:     Visit Vitals    /57 (BP 1 Location: Right arm, BP Patient Position: At rest)    Pulse 92    Temp 99.7 °F (37.6 °C)    Resp 19    Wt 46.8 kg (103 lb 1.6 oz)    SpO2 94%    BMI 43.12 kg/m2      O2 Device: Room air    Temp (24hrs), Av.9 °F (37.2 °C), Min:97.7 °F (36.5 °C), Max:100.3 °F (37.9 °C)    701 -  1900  In: 240 [P.O.:240]  Out: -      General appearance:  More alert today. Verbalizes understanding of testing ordered today. Still complains of pain all over, however, when penis extremely painful with manipulation. Foul body odor lingers.  Poor eye contact.    Head: Normocephalic, without obvious abnormality, atraumatic  Eyes: conjunctivae/corneas clear. PERRL  Neck: supple, symmetrical, trachea midline, no JVD  Lungs: clear to auscultation bilaterally  Heart: regular rate and rhythm, S1, S2 normal, no murmur, click, rub or gallop  Abdomen: soft, tender to touch in all lower quads.  No palpable bladder distention. Dribbles urine. Penile pressure will increase drainage to steady drip of purulent drainage and pyuria. Bowel sounds normal. Crepitus to shaft of penis x approx 5 cm. Extremities: Bilateral high AKAs, well healed. Upper extremities normal, atraumatic, no cyanosis or edema  Skin: Skin color, texture, turgor dull, dry.  Lackluster. No rashes or lesions  Neurologic: Grossly normal.  More interactive today.       Additional comments:  Notes, orders, test results, vitals reviewed  Data Review    Recent Results (from the past 24 hour(s))   GLUCOSE, POC    Collection Time: 07/23/17  3:55 PM   Result Value Ref Range    Glucose (POC) 221 (H) 65 - 100 mg/dL   PLEASE READ & DOCUMENT PPD TEST IN 48 HRS    Collection Time: 07/23/17  9:00 PM   Result Value Ref Range    PPD Neg Negative    mm Induration 0 mm   GLUCOSE, POC    Collection Time: 07/23/17 10:44 PM   Result Value Ref Range    Glucose (POC) 210 (H) 65 - 260 mg/dL   METABOLIC PANEL, COMPREHENSIVE    Collection Time: 07/24/17  5:40 AM   Result Value Ref Range    Sodium 133 (L) 136 - 145 mmol/L    Potassium 4.1 3.5 - 5.1 mmol/L    Chloride 100 98 - 107 mmol/L    CO2 24 21 - 32 mmol/L    Anion gap 9 7 - 16 mmol/L    Glucose 103 (H) 65 - 100 mg/dL    BUN 19 8 - 23 MG/DL    Creatinine 0.65 (L) 0.8 - 1.5 MG/DL    GFR est AA >60 >60 ml/min/1.73m2    GFR est non-AA >60 >60 ml/min/1.73m2    Calcium 7.6 (L) 8.3 - 10.4 MG/DL    Bilirubin, total 0.3 0.2 - 1.1 MG/DL    ALT (SGPT) 10 (L) 12 - 65 U/L    AST (SGOT) 24 15 - 37 U/L    Alk.  phosphatase 120 50 - 136 U/L    Protein, total 6.8 6.3 - 8.2 g/dL    Albumin 1.4 (L) 3.2 - 4.6 g/dL    Globulin 5.4 (H) 2.3 - 3.5 g/dL    A-G Ratio 0.3 (L) 1.2 - 3.5     CBC WITH AUTOMATED DIFF    Collection Time: 07/24/17  5:40 AM   Result Value Ref Range    WBC 20.3 (H) 4.3 - 11.1 K/uL    RBC 3.37 (L) 4.23 - 5.67 M/uL    HGB 9.1 (L) 13.6 - 17.2 g/dL    HCT 27.4 (L) 41.1 - 50.3 %    MCV 81.3 79.6 - 97.8 FL    MCH 27.0 26.1 - 32.9 PG    MCHC 33.2 31.4 - 35.0 g/dL    RDW 18.0 (H) 11.9 - 14.6 %    PLATELET 451 848 - 701 K/uL    MPV 9.9 (L) 10.8 - 14.1 FL    DF AUTOMATED      NEUTROPHILS 74 43 - 78 %    LYMPHOCYTES 17 13 - 44 %    MONOCYTES 9 4.0 - 12.0 %    EOSINOPHILS 0 (L) 0.5 - 7.8 %    BASOPHILS 0 0.0 - 2.0 %    IMMATURE GRANULOCYTES 0.4 0.0 - 5.0 %    ABS. NEUTROPHILS 14.9 (H) 1.7 - 8.2 K/UL    ABS. LYMPHOCYTES 3.5 0.5 - 4.6 K/UL    ABS. MONOCYTES 1.7 (H) 0.1 - 1.3 K/UL    ABS. EOSINOPHILS 0.1 0.0 - 0.8 K/UL    ABS. BASOPHILS 0.0 0.0 - 0.2 K/UL    ABS. IMM.  GRANS. 0.1 0.0 - 0.5 K/UL   MAGNESIUM    Collection Time: 07/24/17  5:40 AM   Result Value Ref Range    Magnesium 2.1 1.8 - 2.4 mg/dL   PHOSPHORUS    Collection Time: 07/24/17  5:40 AM   Result Value Ref Range    Phosphorus 2.3 2.3 - 3.7 MG/DL   GLUCOSE, POC    Collection Time: 07/24/17  7:55 AM   Result Value Ref Range    Glucose (POC) 135 (H) 65 - 100 mg/dL   GLUCOSE, POC    Collection Time: 07/24/17 11:45 AM   Result Value Ref Range    Glucose (POC) 213 (H) 65 - 100 mg/dL     URINE CULTURE GROWING >100,000 COLONIES/ML KLEBSIELLA PNEUMONIAE REPEATING SUSCEPTIB ILITY      Assessment/Plan:   Acute on recurrent UTI:  CURRENT KLEBSIELLA PNEUMONIAE  Long standing history of Tobacco abuse, now quit  Medical non-compliance, longstanding.    DM II poor control:  A1C:  11.8   Dyslipidemia   S/P bilateral AKA 2 to PVD and Uncontrolled diabetes    Dehydration   failure to thrive  Worsening Leukocytosis  7/24   Dementia  Malnourished, protein calorie deficiency  Underweight   Acute prerenal failure   Acute hyperkalemia  Acute hyponatremia  Remote history of gonorrhea, treated    PLAN:  Consult Urology today  Consult ID, spoke with them,  Niall Fulton already in to see  GC, Chlamydia, wet prep and wound culture of penis sent  Add on RPR and PSA  Currently on rocephin for Klebsiella Pneumoniae  Labs in am    Care Plan discussed with:  Patient, MD, ID Dr Niall Fulton and Edita Steen RN, Lab    Signed By: Siddharth Kaminski NP     July 24, 2017

## 2017-07-24 NOTE — PROGRESS NOTES
Request from staff. Updated by staff of grim prognosis. Visited with patient and spouse of 35 years. Spouse very tearful. Spouse called children and they are on the way. Doctor shared that spouse has chosen comfort care. Will support and notify evening . Had prayer with family.   Signed by Nance Pallas, chaplain

## 2017-07-24 NOTE — PROGRESS NOTES
Dr Sadie Doherty, Radiologist phoned. Ultrasound concerning for subq gas. Recommend CT with contrast asap. Same ordered, RN notified.

## 2017-07-24 NOTE — PROGRESS NOTES
SW consult received. Chart reviewed and SW attempted to see pt for discharge planning purposes. Pt not in the room and no family at bedside. SW will follow up with pt tomorrow.

## 2017-07-24 NOTE — CONSULTS
CONSULT    Subjective:      Bessy Wick is a 58 y.o. male underweight AAM with a PMH of PAD s/p bilateral AKA, CAD, chronic HFREF (EF 35%), IDDM2, CAD, and history of tobacco and alcohol abuse who presented to the ER from home 7/21/17 with progressive lethargy, fatigue, mild confusion, sacral pain, flank pain, and suprapubic pain. Per his wife, the patient was at [facility] until two weeks ago and \"was doing fine\", but 2 days after returning home he \"just wasn't the same\" and has been progressively becoming weaker and more lethargic over those two weeks. He was discharged on 7/2/17 from [facility] to [facility] with a Alas catheter. He kept this until his last day at rehab where they removed it just prior to being discharged.     In the ER he was found to have pyruia and leukocytosis along with the symptoms above. On the morning of 7/24/17 he was found to have crepitus of the penile tissue. CT pelvis without contrast revealed extensive subcutaneous gas along the course of the penis to the level of the perineum, suspicious for necrotizing fasciitis given the history of penile edema and drainage. Gas is contiguous with the penile urethra, and gas is also present in the moderately distended urinary bladder.     Urology was then consulted. Past Medical History:   Diagnosis Date    Alcohol abuse, in remission 6/13/2017    Arthritis     CAD (coronary artery disease) 2008    MI 2006 with stent, stent 2008    Chronic hyponatremia 3/3/2014    Chronic pain     generalized    Dyslipidemia     GERD (gastroesophageal reflux disease)     Hypercholesterolemia     Hypertension     under control with med    Insulin dependent diabetes mellitus ([address] type 2 since 2005    insulin reliant.  bs: \"120'S\" BUT RUNS HIGH    Medical non-compliance 6/25/2012    MI (myocardial infarction) ([address]  2008    EF 55%    Other postprocedural status 1/2/2014 07/03/2013: S/P Right common femoral endarterectomy     PAD (peripheral artery disease) ([facility] 10/5/2009    10/24/12 Graft thrombectomy through leg incision of left iliac  to popliteal bypass and left popliteal to posterior tibial bypass -Dr. Quinton Canseco  10/15/09 Left external Iliac artery to above the knee popliteal artery bypass graft propaten PTFE, stenting L EIA- Dr. Quinton Canseco 10/5/09 Open amputation left 2nd, 3rd toes- Dr. Jasper Gary 11/6/08 Left common femoral, superficial femoral and profunda femoris art    PUD (peptic ulcer disease)     PVD (peripheral vascular disease) ([facility]     Seizures ([facility]     x1 about 2010?     Sepsis ([facility] 2/26/2014    Thrombosis of Left External Iliac to above knee popliteal artery bypass graft 9/23/2010    Tobacco abuse 10/5/2009     Past Surgical History:   Procedure Laterality Date    AMPUTATION TOE,I-P JT  2008    3 toes on left foot    BYPASS GRAFT OTHR,ILIOFEMORAL  2009    left     CARDIAC SURG PROCEDURE UNLIST  2009    2-3 stents    HX ABOVE KNEE AMPUTATION Right 2014    HX AMPUTATION Left 11/14/2012    Left leg above the knee amputation     HX FEMORAL BYPASS  8/29/12    OCCLUDED GRAFT    HX THROMBECTOMY  7/02/12    Graft thrombectomy, left common iliac artery angioplasty and stent, left popliteal artery balloon angioplasty    HX THROMBECTOMY  10/22/2012    Graft thrombectomy through leg incision of left iliac to popliteal bypass and left popliteal to posterior tibial bypass    VASCULAR SURGERY PROCEDURE UNLIST  07/03/2013    R common femoral endarterectomy, re-do groin dissection    VASCULAR SURGERY PROCEDURE UNLIST  06/13/2011    R common femoral endarterectomy, left common and external iliac artery balloon angioplasty,  selective catheterization of left external iliac artery from right common femoral artery    VASCULAR SURGERY PROCEDURE UNLIST  06/25/2012    L Femoral-popliteal graft thrombectomy      Family History   Problem Relation Age of Onset    Stroke Mother     Hypertension Mother     Diabetes Other      Social History   Substance Use Topics    Smoking status: Current Every Day Smoker     Packs/day: 0.25     Years: 46.00    Smokeless tobacco: Never Used    Alcohol use 2.0 oz/week     4 Standard drinks or equivalent per week     Allergies   Allergen Reactions    Contrast Agent [Iodine] Hives     IVP DYE/Contrast, pt medicated with prednisone 50mg x 3 and still had hives, itching. DID WELL THE NEXT TIME WITHOUT ANY SYMPTOMS    Restoril [Temazepam] Hives     Prior to Admission medications    Medication Sig Start Date End Date Taking? Authorizing Provider   carvedilol (COREG) 6.25 mg tablet Take 1 Tab by mouth two (2) times daily (with meals). 7/2/17   Hui Muse MD   furosemide (LASIX) 40 mg tablet Take 1 Tab by mouth daily. 7/2/17   Hui Muse MD   lisinopril (PRINIVIL, ZESTRIL) 10 mg tablet Take 1 Tab by mouth daily. 7/2/17   Hui Muse MD   nicotine (NICODERM CQ) 14 mg/24 hr patch 1 Patch by TransDERmal route daily for 30 days. 7/2/17 8/1/17  Hui Muse MD   spironolactone (ALDACTONE) 25 mg tablet Take 1 Tab by mouth daily. 7/2/17   Hui Muse MD   levoFLOXacin (LEVAQUIN) 750 mg tablet Take 1 Tab by mouth daily. 7/2/17   Hui Muse MD   HYDROcodone-acetaminophen (NORCO) 5-325 mg per tablet Take 1 Tab by mouth every eight (8) hours as needed for Pain. Max Daily Amount: 3 Tabs. 7/2/17   Lemmie Hazard A Aleksandar, DO   insulin glargine (LANTUS) 100 unit/mL injection 20 Units by SubCUTAneous route every morning. 6/16/17   Vania Green MD   polyethylene glycol [facility]) 17 gram packet Take 1 Packet by mouth daily as needed. 6/16/17   Vania Green MD   senna-docusate (PERICOLACE) 8.6-50 mg per tablet Take 2 Tabs by mouth daily. 6/16/17   Vania Green MD   lacosamide (VIMPAT) 100 mg tab tablet Take 100 mg by mouth two (2) times a day. Historical Provider   levETIRAcetam (KEPPRA) 500 mg tablet Take 1 Tab by mouth two (2) times a day.  1/30/16   Maddie Jordan MD   atorvastatin (LIPITOR) 80 mg tablet Take 80 mg by mouth daily. Mesfin Waddell MD   aspirin delayed-release 81 mg tablet Take 81 mg by mouth daily. Mesfin Waddell MD   gabapentin (NEURONTIN) 300 mg capsule Take 2 Caps by mouth three (3) times daily. 12   ARNULFO Melchor   ranitidine (ZANTAC) 150 mg tablet Take 150 mg by mouth two (2) times a day. Historical Provider        Review of Systems:  Pertinent items are noted in HPI. Objective:      Patient Vitals for the past 8 hrs:   BP Temp Pulse Resp SpO2   17 1604 102/54 98 °F (36.7 °C) 89 19 98 %   17 1142 107/57 99.7 °F (37.6 °C) 92 19 94 %       Temp (24hrs), Av.9 °F (37.2 °C), Min:98 °F (36.7 °C), Max:100.3 °F (37.9 °C)      Physical Exam:  GENERAL: moderate distress with active rigors and sweat upon forehead, appears older than stated age, toxic, arousable but then disoriented, quickly slips back into stupor LUNG: coarse breath sounds, HEART: tachycardic, ABDOMEN: soft, non-tender. Bowel sounds normal. No masses,  no organomegaly, : penis is full of SQ air with severe crepitus and purulence drains with pressure. Scrotum is also erythematous with skin breakdown dependently.          Recent Results (from the past 24 hour(s))   PLEASE READ & DOCUMENT PPD TEST IN 48 HRS    Collection Time: 17  9:00 PM   Result Value Ref Range    PPD Neg Negative    mm Induration 0 mm   GLUCOSE, POC    Collection Time: 17 10:44 PM   Result Value Ref Range    Glucose (POC) 210 (H) 65 - 891 mg/dL   METABOLIC PANEL, COMPREHENSIVE    Collection Time: 17  5:40 AM   Result Value Ref Range    Sodium 133 (L) 136 - 145 mmol/L    Potassium 4.1 3.5 - 5.1 mmol/L    Chloride 100 98 - 107 mmol/L    CO2 24 21 - 32 mmol/L    Anion gap 9 7 - 16 mmol/L    Glucose 103 (H) 65 - 100 mg/dL    BUN 19 8 - 23 MG/DL    Creatinine 0.65 (L) 0.8 - 1.5 MG/DL    GFR est AA >60 >60 ml/min/1.73m2    GFR est non-AA >60 >60 ml/min/1.73m2    Calcium 7.6 (L) 8.3 - 10.4 MG/DL    Bilirubin, total 0.3 0.2 - 1.1 MG/DL    ALT (SGPT) 10 (L) 12 - 65 U/L    AST (SGOT) 24 15 - 37 U/L    Alk. phosphatase 120 50 - 136 U/L    Protein, total 6.8 6.3 - 8.2 g/dL    Albumin 1.4 (L) 3.2 - 4.6 g/dL    Globulin 5.4 (H) 2.3 - 3.5 g/dL    A-G Ratio 0.3 (L) 1.2 - 3.5     CBC WITH AUTOMATED DIFF    Collection Time: 07/24/17  5:40 AM   Result Value Ref Range    WBC 20.3 (H) 4.3 - 11.1 K/uL    RBC 3.37 (L) 4.23 - 5.67 M/uL    HGB 9.1 (L) 13.6 - 17.2 g/dL    HCT 27.4 (L) 41.1 - 50.3 %    MCV 81.3 79.6 - 97.8 FL    MCH 27.0 26.1 - 32.9 PG    MCHC 33.2 31.4 - 35.0 g/dL    RDW 18.0 (H) 11.9 - 14.6 %    PLATELET 879 255 - 511 K/uL    MPV 9.9 (L) 10.8 - 14.1 FL    DF AUTOMATED      NEUTROPHILS 74 43 - 78 %    LYMPHOCYTES 17 13 - 44 %    MONOCYTES 9 4.0 - 12.0 %    EOSINOPHILS 0 (L) 0.5 - 7.8 %    BASOPHILS 0 0.0 - 2.0 %    IMMATURE GRANULOCYTES 0.4 0.0 - 5.0 %    ABS. NEUTROPHILS 14.9 (H) 1.7 - 8.2 K/UL    ABS. LYMPHOCYTES 3.5 0.5 - 4.6 K/UL    ABS. MONOCYTES 1.7 (H) 0.1 - 1.3 K/UL    ABS. EOSINOPHILS 0.1 0.0 - 0.8 K/UL    ABS. BASOPHILS 0.0 0.0 - 0.2 K/UL    ABS. IMM.  GRANS. 0.1 0.0 - 0.5 K/UL   MAGNESIUM    Collection Time: 07/24/17  5:40 AM   Result Value Ref Range    Magnesium 2.1 1.8 - 2.4 mg/dL   PHOSPHORUS    Collection Time: 07/24/17  5:40 AM   Result Value Ref Range    Phosphorus 2.3 2.3 - 3.7 MG/DL   GLUCOSE, POC    Collection Time: 07/24/17  7:55 AM   Result Value Ref Range    Glucose (POC) 135 (H) 65 - 100 mg/dL   GLUCOSE, POC    Collection Time: 07/24/17 11:45 AM   Result Value Ref Range    Glucose (POC) 213 (H) 65 - 100 mg/dL   WET PREP    Collection Time: 07/24/17  1:05 PM   Result Value Ref Range    Special Requests: NO SPECIAL REQUESTS      Wet prep 10 TO 35 WBCS/HPF     Wet prep NO YEAST,TRICHOMONAS OR CLUE CELLS NOTED     GLUCOSE, POC    Collection Time: 07/24/17  4:08 PM   Result Value Ref Range    Glucose (POC) 109 (H) 65 - 100 mg/dL              Assessment:     Zelalem's gangrene    Plan:     Patient's diagnosis and the LOW likelihood of a meaningful quality of life even if he survived the surgery was discussed with family. Mortality with ALL Zelalem's cases runs at 50% and Mr. Owen Hilario underlying comorbidities and severity of infection at time of presentation make his survival chances much lower. I would not expect him to ever be able to extubate even if everything went well with the actual surgical debridement. I also discussed the nature of debridement with his family and what would be expected afterwards in regards to pain and dressing changes. After discussion, patient's family has decided to withdraw care and move to DNR/DNI with comfort care measures. I agree.     Signed By: Regan Mazariegos MD                         July 24, 2017

## 2017-07-24 NOTE — CONSULTS
Infectious Disease Consult    Today's Date: 7/24/2017   Admit Date: 7/21/2017    Impression:   · Klebsiella UTI  · Balanitis  · Fever/leukocyotosis  · Recent C.glabrata UTI  · Uncontrolled DM  · Jarrett AKAs from PAD    Plan:   · Agree with Urology evaluation  · Change antbx to Zosyn, add fungal coverage with Eraxis  · Screen for HIV, check RPR. GC/chlamydia pending  · Follow temp curve, WBC    Anti-infectives:   Zosyn (7/24-  Eraxis (7/24-  Ceftriaxone 7/21-7/24    Subjective:   Date of Consultation:  July 24, 2017  Referring Physician: Roxana Felix NP    Patient is a 58 y.o. male readmitted to the hospital after recent DC home from rehab. He was brought by his wife with reports of several days of worsening penile edema and drainage. He was recently hospitalized twice for amongst other things, gross hematuria requiring 3-way craven drainage, jane-rectal abscess growing klebsiella in the cultures (treated with IV then oral Cipro/Clinda) and also found to have candida glabrata in urine (treated with Fluconazole). Upon re-admission he has been noted to have fever as high as 100.8, leukocytosis up to 20.8, he has been started on Ceftriaxone with urology evaluation pending. Pt denies dysuria prior to admission, he reports pain to his penis, with fevers. He is not able to clarify HPI further, majority of info obtained with chart review; no family at bedside.      Patient Active Problem List   Diagnosis Code    PAD (peripheral artery disease) ([facility]) I73.9    HTN (hypertension) I10    Tobacco abuse Z72.0    GERD (gastroesophageal reflux disease) K21.9    Thrombosis of Left External Iliac to above knee popliteal artery bypass graft I82.90    Atherosclerosis of native arteries of the extremities with rest pain I70.229    ETOH abuse F10.10    Allergy to intravenous contrast Z91.041    CAD (coronary artery disease) I25.10    Medical non-compliance Z91.19    Ischemic leg I99.8    Postoperative anemia due to acute blood loss D62    Gangrene from atherosclerosis, extremities ([facility]) I70.269    DM (diabetes mellitus) type II uncontrolled, periph vascular disorder (HCC) E11.51, E11.65    Dyslipidemia E78.5    Carotid artery disease (HCC) I77.9    Status post above knee amputation ([facility] Z89.619    Other postprocedural status Z98.890    Sepsis ([facility] A41.9    Chronic hyponatremia E87.1    Hyponatremia E87.1    Acute renal failure (HCC) N17.9    Rectal bleed K62.5    Hypotension I95.9    S/P AKA (above knee amputation) bilateral (HCC) Z89.611, Z89.612    Dehydration E86.0    Diarrhea R19.7    Leukocytosis D72.829    UTI (urinary tract infection) N39.0    Acute encephalopathy G93.40    Dementia F03.90    Malnutrition ([facility]) E46    Underweight R63.6    Perirectal fistula K60.4    Hypokalemia E87.6    Nausea and vomiting R11.2    HCAP (healthcare-associated pneumonia) J18.9    Hypoglycemia E16.2    Chronic systolic congestive heart failure (HCC) I50.22    EVANGELINA (acute kidney injury) ([facility] N17.9    Hyperkalemia E87.5    Acute metabolic encephalopathy A06.42    Elevated liver enzymes R74.8    Anemia D64.9    Hypoxemia R09.02    Pleural effusion, bilateral J90    Pulmonary infiltrates R91.8    Acute prerenal failure (HCC) N17.9    Acute hyperkalemia E87.5    Acute hyponatremia E87.1     Past Medical History:   Diagnosis Date    Alcohol abuse, in remission 6/13/2017    Arthritis     CAD (coronary artery disease) 2008    MI 2006 with stent, stent 2008    Chronic hyponatremia 3/3/2014    Chronic pain     generalized    Dyslipidemia     GERD (gastroesophageal reflux disease)     Hypercholesterolemia     Hypertension     under control with med    Insulin dependent diabetes mellitus ([facility] type 2 since 2005    insulin reliant.  bs: \"120'S\" BUT RUNS HIGH    Medical non-compliance 6/25/2012    MI (myocardial infarction) ([facility]  2008    EF 55%    Other postprocedural status 1/2/2014 07/03/2013: S/P Right common femoral endarterectomy     PAD (peripheral artery disease) ([facility] [address] 10/5/2009    10/24/12 Graft thrombectomy through leg incision of left iliac  to popliteal bypass and left popliteal to posterior tibial bypass -Dr. Steph Jo  10/15/09 Left external Iliac artery to above the knee popliteal artery bypass graft propaten PTFE, stenting L EIA- Dr. Steph Jo 10/5/09 Open amputation left 2nd, 3rd toes- Dr. Meghan Dawson 11/6/08 Left common femoral, superficial femoral and profunda femoris art    PUD (peptic ulcer disease)     PVD (peripheral vascular disease) ([facility]     Seizures ([facility] [address]     x1 about 2010?     [facility] 2/26/2014    Thrombosis of Left External Iliac to above knee popliteal artery bypass graft 9/23/2010    Tobacco abuse 10/5/2009      Family History   Problem Relation Age of Onset    Stroke Mother     Hypertension Mother     Diabetes Other       Social History   Substance Use Topics    Smoking status: Current Every Day Smoker     Packs/day: 0.25     Years: 46.00    Smokeless tobacco: Never Used    Alcohol use 2.0 oz/week     4 Standard drinks or equivalent per week     Past Surgical History:   Procedure Laterality Date    AMPUTATION TOE,I-P JT  2008    3 toes on left foot    BYPASS GRAFT OTHR,ILIOFEMORAL  2009    left     CARDIAC SURG PROCEDURE UNLIST  2009    2-3 stents    HX ABOVE KNEE AMPUTATION Right 2014    HX AMPUTATION Left 11/14/2012    Left leg above the knee amputation     HX FEMORAL BYPASS  8/29/12    OCCLUDED GRAFT    HX THROMBECTOMY  7/02/12    Graft thrombectomy, left common iliac artery angioplasty and stent, left popliteal artery balloon angioplasty    HX THROMBECTOMY  10/22/2012    Graft thrombectomy through leg incision of left iliac to popliteal bypass and left popliteal to posterior tibial bypass    VASCULAR SURGERY PROCEDURE UNLIST  07/03/2013    R common femoral endarterectomy, re-do groin dissection    VASCULAR SURGERY PROCEDURE UNLIST  06/13/2011    R common femoral endarterectomy, left common and external iliac artery balloon angioplasty,  selective catheterization of left external iliac artery from right common femoral artery    VASCULAR SURGERY PROCEDURE UNLIST  06/25/2012    L Femoral-popliteal graft thrombectomy      Prior to Admission medications    Medication Sig Start Date End Date Taking? Authorizing Provider   carvedilol (COREG) 6.25 mg tablet Take 1 Tab by mouth two (2) times daily (with meals). 7/2/17   Judah Wray MD   furosemide (LASIX) 40 mg tablet Take 1 Tab by mouth daily. 7/2/17   Judah Wray MD   lisinopril (PRINIVIL, ZESTRIL) 10 mg tablet Take 1 Tab by mouth daily. 7/2/17   Judah Wray MD   nicotine (NICODERM CQ) 14 mg/24 hr patch 1 Patch by TransDERmal route daily for 30 days. 7/2/17 8/1/17  Judah Wray MD   spironolactone (ALDACTONE) 25 mg tablet Take 1 Tab by mouth daily. 7/2/17   Judah Wray MD   levoFLOXacin (LEVAQUIN) 750 mg tablet Take 1 Tab by mouth daily. 7/2/17   Judah Wray MD   HYDROcodone-acetaminophen (NORCO) 5-325 mg per tablet Take 1 Tab by mouth every eight (8) hours as needed for Pain. Max Daily Amount: 3 Tabs. 7/2/17   Rosa Maria Huertas DO   insulin glargine (LANTUS) 100 unit/mL injection 20 Units by SubCUTAneous route every morning. 6/16/17   Darell Archer MD   polyethylene glycol [facility]) 17 gram packet Take 1 Packet by mouth daily as needed. 6/16/17   Darell Archer MD   senna-docusate (PERICOLACE) 8.6-50 mg per tablet Take 2 Tabs by mouth daily. 6/16/17   Darell Archer MD   lacosamide (VIMPAT) 100 mg tab tablet Take 100 mg by mouth two (2) times a day. Historical Provider   levETIRAcetam (KEPPRA) 500 mg tablet Take 1 Tab by mouth two (2) times a day. 1/30/16   Opal Carpio MD   atorvastatin (LIPITOR) 80 mg tablet Take 80 mg by mouth daily. Mesfin Waddell MD   aspirin delayed-release 81 mg tablet Take 81 mg by mouth daily.     Phys Other, MD   gabapentin (NEURONTIN) 300 mg capsule Take 2 Caps by mouth three (3) times daily. 12   ARNULFO Melchor   ranitidine (ZANTAC) 150 mg tablet Take 150 mg by mouth two (2) times a day. Historical Provider       Allergies   Allergen Reactions    Contrast Agent [Iodine] Hives     IVP DYE/Contrast, pt medicated with prednisone 50mg x 3 and still had hives, itching. DID WELL THE NEXT TIME WITHOUT ANY SYMPTOMS    Restoril [Temazepam] Hives        Review of Systems:  A comprehensive review of systems was negative except for that written in the History of Present Illness. Objective:     Visit Vitals    /57 (BP 1 Location: Right arm, BP Patient Position: At rest)    Pulse 92    Temp 99.7 °F (37.6 °C)    Resp 19    Wt 46.8 kg (103 lb 1.6 oz)    SpO2 94%    BMI 43.12 kg/m2     Temp (24hrs), Av.9 °F (37.2 °C), Min:97.7 °F (36.5 °C), Max:100.3 °F (37.9 °C)       Lines:  Peripheral IV:  RUE intact          Physical Exam:    General:  Alert, cooperative, well noursished, well developed, appears stated age   Eyes:  Sclera anicteric. Pupils equally round and reactive to light. Mouth/Throat: Mucous membranes normal, oral pharynx clear. Missing teeth   Neck: Supple   Lungs:   Clear to auscultation bilaterally, good effort   CV:  Regular rate and rhythm,no murmur, click, rub or gallop   Abdomen:   Soft, non-tender.  bowel sounds normal. non-distended   Extremities: No cyanosis or edema, Jarrett AKAs healed   Skin: Skin color, texture, turgor normal. no acute rash or lesions   Lymph nodes: Cervical and supraclavicular normal   Musculoskeletal: No swelling or deformity   Lines/Devices:  Intact, no erythema, drainage or tenderness   Psych: Alert and oriented, normal mood affect given the setting     : Penis with moderate edema with unretractable foreskin and skin lesion/excoriation to R side, mild scrotal erythema but over edema, gluteal fold benign      Data Review:     CBC:Recent Labs      17   0540  17   0520  17   0620   WBC  20.3*  14.4* 14.5*   GRANS  74  71  66   MONOS  9  8  9   EOS  0*  2  2   ANEU  14.9*  10.2*  9.6*   ABL  3.5  2.7  3.3   HGB  9.1*  9.2*  9.8*   HCT  27.4*  27.7*  29.9*   PLT  362  374  424       BMP:  Recent Labs      07/24/17   0540  07/23/17   0520  07/22/17   0620   CREA  0.65*  0.71*  0.62*   BUN  19  23  27*   NA  133*  131*  134*   K  4.1  4.6  4.3   CL  100  98  98   CO2  24  23  25   AGAP  9  10  11   GLU  103*  195*  123*       LFTS:  Recent Labs      07/24/17   0540  07/23/17   0520  07/21/17   1655   TBILI  0.3  0.2  0.4   ALT  10*  10*  12   SGOT  24  23  50*   AP  120  106  115   TP  6.8  7.0  7.9   ALB  1.4*  1.5*  1.8*       Microbiology:     All Micro Results     Procedure Component Value Units Date/Time    CULTURE, Steen Comp STAIN [[id number]]     Order Status:  Sent Specimen:  Drainage     WET PREP [[id number]]     Order Status:  Sent Specimen:  Penis     CULTURE, BLOOD [[id number]] Collected:  07/23/17 0335    Order Status:  Completed Specimen:  Blood from Blood Updated:  07/24/17 0913     Special Requests: RIGHT HAND        Culture result: NO GROWTH 1 DAY       CULTURE, BLOOD [[id number]] Collected:  07/23/17 0330    Order Status:  Completed Specimen:  Blood from Blood Updated:  07/24/17 0913     Special Requests: RIGHT HAND        Culture result: NO GROWTH 1 DAY       CULTURE, URINE [[id number]]  (Abnormal) Collected:  07/21/17 1450    Order Status:  Completed Specimen:  Urine from Clean catch Updated:  07/24/17 0651     Special Requests: NO SPECIAL REQUESTS        Culture result:         >100,000 COLONIES/mL KLEBSIELLA PNEUMONIAE REPEATING SUSCEPTIBILITY (A)          Imaging:   CXR clear    Signed By: Bernardino Ansari NP     July 24, 2017

## 2017-07-24 NOTE — PROGRESS NOTES
To room with Dr Neris Herman. Exam yields penile edema at least twice as large and twice as firm as my earlier exam.  Wife, Patricia Serrano arrived. Dr Neris Herman informed them of grim prognosis and choice of urgent surgery vs comfort care with 100% mortality. They are currently discussing. Children phoned to come in per wife. [place] here.

## 2017-07-24 NOTE — PROGRESS NOTES
LEIGHANN Melton phoned with CT report per Dr Gomes radiologist.     CT PELVIS:    There is extensive subcutaneous gas present along the length of the penis, some of which may be present in the urethra itself. Gas extends to the base of the penis at the level of the perineum. Small amount of gas is present in the urinary bladder which is otherwise distended. Dependent free pelvic fluid is present in the pelvis. The large bowel is normal in caliber, containing a moderate amount of stool. Nonopacified small bowel loops are normal in caliber. Trace free fluid is present adjacent to the  inferior aspect of the right hepatic lobe, and there is mild perinephric stranding on the right. Atherosclerotic abdominal aorta with vascular stent in the left iliac vessels, the patency of which cannot be assessed in absence of IV contrast. Vascular  clips are present in both inguinal regions. Small bilateral inguinal lymph nodes are present. Sclerosis in the right femoral head raises the possibility of avascular necrosis. High attenuation material is present along the skin surface of the penis and scrotum. Scrotal contents are better assessed with ultrasound.   IMPRESSION:  Extensive subcutaneous gas along the course of the penis to the level of the perineum, suspicious for necrotizing fasciitis given the history of penile edema and drainage. Surgical consultation recommended. Gas is contiguous with the penile urethra, and gas is also present in the moderately distended urinary bladder. Dependent free pelvic fluid in the pelvis. ULTRASOUND SCROTUM:  The right testicle measures 3.1 cm x 2 cm x 2.3 cm. No solid or cystic lesions are seen of the right testicle. No abnormal calcifications are seen of the right testicle. Intact vascular flow is seen into the right testicle. The right  epididymis is enlarged. The right epididymis and right testicle are hypervascular. No significant hydrocele is seen. No right varicocele is seen.  The left testicle measures 3.1 cm x 1.8 cm x 1.7 cm. No solid or cystic lesions are seen of the left testicle. No abnormal calcifications are seen of the left testicle. Intact vascular flow is seen into the left testicle. Although the left epididymis is nonenlarged, the left testicle and epididymis are hypervascular. No significant hydrocele is seen. No left varicocele is seen. The scrotal soft tissues are thickened and hypervascular. In addition, multiple abnormal echogenic foci are seen particularly at the base of the penis. Rather than dense shadowing, these produce dirty shadowing at times and therefore are  concerning for soft tissue gas. IMPRESSION:  Scrotal wall thickening and hypervascularity throughout the scrotal contents concerning for an inflammatory process. At the base of the penis, there are abnormal echogenic foci producing dirty shadowing concerning for soft tissue gas  and therefore Zelalem's gangrene. This should be confirmed with a preferably contrasted CT scan prior to treatment.     Dr Ondina Summers paged to call me ASAP.

## 2017-07-24 NOTE — WOUND CARE
Gluteal cleft with macerated moist skin and patch of partial thickness skin loss [id number] pink base, continue zinc based barrier cream.  Scrotum with partial thickness skin loss 2x2x0.1cm. Penis edematous, with crepitus full length of shaft, partial thickness skin loss on anterior surface, foul smelling drainage. Concern for fourniers's gangrene? ?? Recommend urology consult. Recommend zinc based barrier creams to scrotum and gluteal cleft areas.

## 2017-07-25 PROBLEM — N49.3 FOURNIER'S GANGRENE OF PENIS: Status: ACTIVE | Noted: 2017-01-01

## 2017-07-25 NOTE — PROGRESS NOTES
Patient leaving with [facility] to [facility]. Patient being discharged with IV in place per request of [facility]. Family at bedside and will be going with patient.

## 2017-07-25 NOTE — PROGRESS NOTES
Hospitalist Progress Note    2017  Admit Date: 2017  1:21 PM   NAME: Mariia Del Castillo   :  1955   MRN:  [id number]   Attending: Jasmin Arroyo DO  PCP:  Meño Westbrook MD  Treatment Team: Attending Provider: Jasmin Arroyo DO; Utilization Review: Cloteal Nan; Consulting Provider: Skylar Christianson MD; Care Manager: Ruth Stephens [facility]; Physician: Skylar Christianson MD    DNR   SUBJECTIVE:     Mr. Luis Millard is a 59 yo male who presented with progressive lethargy, confusion, sacral pain, penile swelling and drainage X2 days. Had recent admission with craven cath. DC to STR with craven in place. Craven removed prior to DC from STR. Pt found to have UTI. Urine cx with klebsiella 17. Of note recent admission 17 urine cx with candida glabrata. He was started on Rocephin on admission which was stopped  and Zosyn and Eraxis started . Pt started to have significant penile edema and crepitus of the penile tissue. CT pelvis revealed extensive subcutaneous gas along the course of the penis to the level of the perineum, suspicious for necrotizing fascitis. Gas is contiguous with the penile urethra and gas is also present in the moderately distended urinary bladder. Urology consulted, family decided no surgical intervention. Made comfort care. Pt lethargic today. Opens eyes briefly however does not answer questions. Wife at bedside. Long discussion with wife and she agrees Hospice consult is appropriate. Past Medical History:   Diagnosis Date    Alcohol abuse, in remission 2017    Arthritis     CAD (coronary artery disease)     MI  with stent, stent     Chronic hyponatremia 3/3/2014    Chronic pain     generalized    Dyslipidemia     GERD (gastroesophageal reflux disease)     Hypercholesterolemia     Hypertension     under control with med    Insulin dependent diabetes mellitus ([facility] type 2 since     insulin reliant.  bs: \"120'S\" BUT RUNS HIGH    Medical non-compliance 6/25/2012    MI (myocardial infarction) ([facility]  2008    EF 55%    Other postprocedural status 1/2/2014 07/03/2013: S/P Right common femoral endarterectomy     PAD (peripheral artery disease) ([facility] 10/5/2009    10/24/12 Graft thrombectomy through leg incision of left iliac  to popliteal bypass and left popliteal to posterior tibial bypass -Dr. Haresh Liz  10/15/09 Left external Iliac artery to above the knee popliteal artery bypass graft propaten PTFE, stenting L EIA- Dr. Haresh Liz 10/5/09 Open amputation left 2nd, 3rd toes- Dr. Fajardo Living 11/6/08 Left common femoral, superficial femoral and profunda femoris art    PUD (peptic ulcer disease)     PVD (peripheral vascular disease) ([facility]     Seizures ([facility]     x1 about 2010?  Sepsis ([facility] 2/26/2014    Thrombosis of Left External Iliac to above knee popliteal artery bypass graft 9/23/2010    Tobacco abuse 10/5/2009     Recent Results (from the past 24 hour(s))   GLUCOSE, POC    Collection Time: 07/24/17  4:08 PM   Result Value Ref Range    Glucose (POC) 109 (H) 65 - 100 mg/dL   PLEASE READ & DOCUMENT PPD TEST IN 72 HRS    Collection Time: 07/24/17  9:00 PM   Result Value Ref Range    PPD neg Negative    mm Induration 0 mm   GLUCOSE, POC    Collection Time: 07/24/17 10:33 PM   Result Value Ref Range    Glucose (POC) 27 (LL) 65 - 100 mg/dL   GLUCOSE, POC    Collection Time: 07/25/17  1:30 AM   Result Value Ref Range    Glucose (POC) 276 (H) 65 - 100 mg/dL   GLUCOSE, POC    Collection Time: 07/25/17  7:08 AM   Result Value Ref Range    Glucose (POC) 324 (H) 65 - 100 mg/dL     Allergies   Allergen Reactions    Contrast Agent [Iodine] Hives     IVP DYE/Contrast, pt medicated with prednisone 50mg x 3 and still had hives, itching.     DID WELL THE NEXT TIME WITHOUT ANY SYMPTOMS    Restoril [Temazepam] Hives     Current Facility-Administered Medications   Medication Dose Route Frequency Provider Last Rate Last Dose    HYDROmorphone (PF) (DILAUDID) injection 1 mg  1 mg IntraVENous Q4H PRN Siddharth Kaminski NP        HYDROmorphone (PF) (DILAUDID) injection 1 mg  1 mg IntraVENous Q1H PRN Siddharth Kaminski NP   1 mg at 17 1810    ondansetron (ZOFRAN) injection 4 mg  4 mg IntraVENous Q4H PRN Siddharth Kaminski NP        acetaminophen (TYLENOL) suppository 975 mg  975 mg Rectal Q4H PRN Siddharth Kaminski NP        dextrose (D50W) injection syrg 25 g  25 g IntraVENous Q4H PRN Nitin Javier MD   25 g at 17 2239    acetaminophen (TYLENOL) tablet 650 mg  650 mg Oral Q6H PRN Shira Maravilla MD   650 mg at 17 2300    gabapentin (NEURONTIN) capsule 600 mg  600 mg Oral TID Vena Vinicius, DO   600 mg at 17 0835    HYDROcodone-acetaminophen (NORCO) 5-325 mg per tablet 1 Tab  1 Tab Oral Q6H PRN Vena Vinicius, DO   1 Tab at 17 1126    lacosamide (VIMPAT) tablet 100 mg  100 mg Oral BID Vena Vinicius, DO   100 mg at 17 5188    levETIRAcetam (KEPPRA) tablet 500 mg  500 mg Oral BID Vena Vinicius, DO   500 mg at 17 5343    0.9% sodium chloride infusion  75 mL/hr IntraVENous CONTINUOUS Vena Vinicius, DO 75 mL/hr at 17 0843 75 mL/hr at 17 0843       Review of Systems unable to obtain  PHYSICAL EXAM     Visit Vitals    /54 (BP 1 Location: Right arm, BP Patient Position: At rest)    Pulse 89    Temp 98 °F (36.7 °C)    Resp 19    Wt 46.8 kg (103 lb 1.6 oz)    SpO2 98%    BMI 43.12 kg/m2      Temp (24hrs), Av °F (36.7 °C), Min:98 °F (36.7 °C), Max:98 °F (36.7 °C)    Oxygen Therapy  O2 Sat (%): 98 % (17 1604)  Pulse via Oximetry: 92 beats per minute (17 1837)  O2 Device: Room air (17 1604)  No intake or output data in the 24 hours ending 17 1316   General: Lethargic, opens eyes briefly with tactile stimulation  Lungs: CTA bilaterally. Resp even and nonlabored  Heart:  S1S2 present without murmurs rubs gallops. RRR.    Abdomen: Soft, grimacing with palpation of abd all quadrants, BS present. :  Significant penile edema with crepitus, purulent drainage.    Extremities: Bilateral AKA  Neurologic:  Lethargic, opens eyes briefly to tactile stimulation    Results summary of Diagnostic Studies/Procedures copied from within [facility] EMR:         [address] Problems    Diagnosis Date Noted    Acute prerenal failure ([facility] 07/21/2017    Acute hyperkalemia 07/21/2017    Acute hyponatremia 07/21/2017    UTI (urinary tract infection) 12/30/2014    Dementia 12/30/2014    Underweight 12/30/2014    Leukocytosis 12/05/2014     No obvious source of       Dehydration 12/05/2014    S/P AKA (above knee amputation) bilateral ([facility] 12/05/2014     Due to PVD, diabetes etc      Dyslipidemia 11/15/2012    DM (diabetes mellitus) type II uncontrolled, periph vascular disorder ([facility] 11/14/2012    Medical non-compliance 06/25/2012    Tobacco abuse 10/05/2009     QUIT        Plan:    Likely DC to Hospice today or tomorrow    Notes, labs, VS, diagnostic testing reviewed  Time spent with pt 30 min      Plan of Care Discussed with: Supervising MD Dr. Garrel Kanner, care team, pt wife  Ally Piper, MARY

## 2017-07-25 NOTE — PROGRESS NOTES
Care Management Interventions  Mode of Transport at Discharge: BLS  Transition of Care Consult (CM Consult): Discharge Planning, Hospice House  Discharge Durable Medical Equipment: No  Physical Therapy Consult: No  Occupational Therapy Consult: No  Speech Therapy Consult: No  Current Support Network: Lives with Spouse, Own Home (recently dc to home from [facility])  Plan discussed with Pt/Family/Caregiver: Yes  Freedom of Choice Offered: Yes  Discharge Location  Discharge Placement: Other: [facility])    Pt is a 59 yo male admitted due to UTI and prerenal failure. Pt is found to have necrotizing fascitis. MD recommending comfort care and the pt/family are in agreement. Referral made to [address] and the pt has been accepted for admission to the [facility]. Pt will transfer there today at [address] via [facility]. Pt's spouse is at the bedside and is aware of and in agreement with this plan. DNR signed by spouse and MD and will be provided to transport staff. No other discharge needs or concerns identified or reported at present. SW remains available to assist as needed.

## 2017-07-25 NOTE — PROGRESS NOTES
BS=27. Hospitalist, Dr. Reji Morales, phoned. New orders per Dr. Reji Morales (see MAR). Will continue to monitor patient.

## 2017-07-25 NOTE — DISCHARGE SUMMARY
Hospitalist Discharge Summary   Patient ID:  Kaela Zurita  [id number]  36 y.o.  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)    Active Problems:    Tobacco abuse (10/5/2009)      Overview: QUIT       Medical non-compliance (6/25/2012)      DM (diabetes mellitus) type II uncontrolled, periph vascular disorder ([facility] [address] (11/14/2012)      Dyslipidemia (11/15/2012)      S/P AKA (above knee amputation) bilateral ([facility] (12/5/2014)      Overview: Due to PVD, diabetes etc      Dehydration (12/5/2014)      Leukocytosis (12/5/2014)      Overview: No obvious source of       UTI (urinary tract infection) (12/30/2014)      Dementia (12/30/2014)      Underweight (12/30/2014)      Acute prerenal failure ([facility] [address] (7/21/2017)      Acute hyperkalemia (7/21/2017)      Acute hyponatremia (7/21/2017)       * Admission Diagnoses: Acute prerenal failure ([facility]  UTI (urinary tract infection)  Leukocytosis  * Discharge Diagnoses:    Hospital Problems as of 7/25/2017  Date Reviewed: 6/29/2017          Codes Class Noted - Resolved POA    * (Principal)Zelalem's gangrene of penis ICD-10-CM: N49.3  ICD-9-CM: 608.83  7/25/2017 - Present Yes        Acute prerenal failure ([facility] [place] [address] ICD-10-CM: N17.9  ICD-9-CM: 584.9  7/21/2017 - Present Yes        Acute hyperkalemia ICD-10-CM: E87.5  ICD-9-CM: 276.7  7/21/2017 - Present Yes        Acute hyponatremia ICD-10-CM: E87.1  ICD-9-CM: 276.1  7/21/2017 - Present Yes        UTI (urinary tract infection) ICD-10-CM: N39.0  ICD-9-CM: 599.0  12/30/2014 - Present Yes        Dementia (Chronic) ICD-10-CM: F03.90  ICD-9-CM: 294.20  12/30/2014 - Present Yes        Underweight (Chronic) ICD-10-CM: R63.6  ICD-9-CM: 783.22  12/30/2014 - Present Yes        S/P AKA (above knee amputation) bilateral ([address] (Chronic) ICD-10-CM: R04.267, I91.435  ICD-9-CM: V49.76  12/5/2014 - Present Yes    Overview Signed 12/5/2014  3:54 PM by Luis Fernando Zurita     Due to PVD, diabetes etc             Dehydration ICD-10-CM: E86.0  ICD-9-CM: 276.51  12/5/2014 - Present Yes        Leukocytosis ICD-10-CM: N05.221  ICD-9-CM: 288.60  12/5/2014 - Present Yes    Overview Signed 12/5/2014  4:01 PM by Luis Fernando Zurita     No obvious source of              Dyslipidemia ICD-10-CM: E78.5  ICD-9-CM: 272.4  11/15/2012 - Present Yes        DM (diabetes mellitus) type II uncontrolled, periph vascular disorder ([facility]) (Chronic) ICD-10-CM: E11.51, E11.65  ICD-9-CM: 250.72, 443.81  11/14/2012 - Present Yes        Medical non-compliance (Chronic) ICD-10-CM: Z91.19  ICD-9-CM: V15.81  6/25/2012 - Present Yes        Tobacco abuse (Chronic) ICD-10-CM: Z72.0  ICD-9-CM: 305.1  10/5/2009 - Present Yes    Overview Signed 6/13/2017  5:28 PM by Julianna Larson NP     [facility] Course:  Mr. Brooklyn Rogel is a 59 yo male who presented with progressive lethargy, confusion, sacral pain, penile swelling and drainage X2 days. Had recent admission with craven cath. DC to STR with craven in place. Craven removed prior to DC from STR. Pt found to have UTI. Urine cx with klebsiella 7/21/17. Of note recent admission 7/2/17 urine cx with candida glabrata. He was started on Rocephin on admission which was stopped 7/24 and Zosyn and Eraxis started 7/24. Pt started to have significant penile edema and crepitus of the penile tissue. CT pelvis revealed extensive subcutaneous gas along the course of the penis to the level of the perineum, suspicious for necrotizing fascitis.  Gas is contiguous with the penile urethra and gas is also present in the moderately distended urinary bladder. Urology consulted, family decided no surgical intervention. Made comfort care. Pt lethargic today. Opens eyes briefly however does not answer questions. Wife at bedside. Long discussion with wife and she agrees Hospice consult is appropriate.         Diagnostic Study/Procedure results summary copied from within [facility] EMR:   CT PELV WO CONT    7/24/2017 4:37 PM      CLINICAL INFORMATION: 58year-old with ultrasound or subcutaneous gas. Progressive penile edema and drainage.     Comparison: Scrotal ultrasound 7/24/2017.     PROCEDURE: Emergent noncontrast CT of the pelvis was performed. The patient is  reportedly allergic to IV contrast. Enteric contrast not administered.     Radiation dose reduction techniques were used for this study. Our CT scanners  used one or all of the following: Automated exposure control, adjustment of the  MA and/or kV according to patient size, iterative reconstruction.     Findings: There is extensive subcutaneous gas present along the length of the  penis, some of which may be present in the urethra itself. Gas extends to the  base of the penis at the level of the perineum.     Small amount of gas is present in the urinary bladder which is otherwise  distended.     Dependent free pelvic fluid is present in the pelvis. The large bowel is normal  in caliber, containing a moderate amount of stool. Nonopacified small bowel  loops are normal in caliber. Trace free fluid is present adjacent to the  inferior aspect of the right hepatic lobe, and there is mild perinephric  stranding on the right.     Atherosclerotic abdominal aorta with vascular stent in the left iliac vessels,  the patency of which cannot be assessed in absence of IV contrast. Vascular  clips are present in both inguinal regions. Small bilateral inguinal lymph nodes  are present. Sclerosis in the right femoral head raises the possibility of  avascular necrosis.  High attenuation material is present along the skin surface  of the penis and scrotum. Scrotal contents are better assessed with ultrasound.     IMPRESSION  IMPRESSION:     1. Extensive subcutaneous gas along the course of the penis to the level of the  perineum, suspicious for necrotizing fasciitis given the history of penile edema  and drainage. Surgical consultation recommended.     2. Gas is contiguous with the penile urethra, and gas is also present in the  moderately distended urinary bladder.     3. Dependent free pelvic fluid in the pelvis.     DC5  The critical results contained in this report were communicated to the patient's  floor nurse by Dr. Robert Mojica on 7/24/2017 at 4:56 PM. Critical results were  communicated as outlined in Section II.C.2.a.i of the ACR Practice Guideline for  Communication of Diagnostic Imaging Findings. Chest X-ray     INDICATION:   Chest pain     A portable AP view of the chest was obtained.     FINDINGS: Interstitial prominence in both lungs is stable and appears chronic. There is no developing acute infiltrate. The heart size is normal.  The bony  thorax is intact.       IMPRESSION  IMPRESSION: No acute findings in the chest      Scrotal ultrasound, 7/24/2017.     CLINICAL HISTORY: Enlarged right epididymal head. Moderate bilateral scrotal  pain, and mild bilateral scrotal swelling. Crepitus under sided venous     Technique: Grayscale and Doppler imaging of the scrotal contents was performed  using a 15 MHz transducer.     FINDINGS:     The right testicle measures 3.1 cm x 2 cm x 2.3 cm. No solid or cystic lesions  are seen of the right testicle. No abnormal calcifications are seen of the right  testicle. Intact vascular flow is seen into the right testicle. The right  epididymis is enlarged. The right epididymis and right testicle are  hypervascular. No significant hydrocele is seen. No right varicocele is seen.     The left testicle measures 3.1 cm x 1.8 cm x 1.7 cm.  No solid or cystic lesions  are seen of the left testicle. No abnormal calcifications are seen of the left  testicle. Intact vascular flow is seen into the left testicle. Although the left  epididymis is nonenlarged, the left testicle and epididymis are hypervascular. No significant hydrocele is seen. No left varicocele is seen.     The scrotal soft tissues are thickened and hypervascular. In addition, multiple  abnormal echogenic foci are seen particularly at the base of the penis. Rather  than dense shadowing, these produce dirty shadowing at times and therefore are  concerning for soft tissue gas.     IMPRESSION  IMPRESSION:  1. Scrotal wall thickening and hypervascularity throughout the scrotal contents  concerning for an inflammatory process. At the base of the penis, there are  abnormal echogenic foci producing dirty shadowing concerning for soft tissue gas  and therefore Zelalem's gangrene. This should be confirmed with a preferably  contrasted CT scan prior to treatment.     These findings and recommendations were discussed with Al, the nurse caring for  the patient by myself personally at 3:35 PM on 7/24/2017. Multiple contact  numbers were provided to have the covering physician contact myself or further  discussion if needed.               Labs: Results:       Chemistry Recent Labs      07/24/17   0540  07/23/17   0520   GLU  103*  195*   NA  133*  131*   K  4.1  4.6   CL  100  98   CO2  24  23   BUN  19  23   CREA  0.65*  0.71*   CA  7.6*  8.1*   AGAP  9  10   TBILI  0.3  0.2   ALT  10*  10*   AP  120  106   TP  6.8  7.0   ALB  1.4*  1.5*   GLOB  5.4*  5.5*   AGRAT  0.3*  0.3*      CBC w/Diff Recent Labs      07/24/17   0540  07/23/17   0520   WBC  20.3*  14.4*   RBC  3.37*  3.38*   HGB  9.1*  9.2*   HCT  27.4*  27.7*   PLT  362  374   GRANS  74  71   LYMPH  17  19   EOS  0*  2      Cardiac Enzymes No results for input(s): CPK, CKND1, TINO in the last 72 hours.     No lab exists for component: CKRMB, TROIP   Coagulation No results for input(s): PTP, INR, APTT in the last 72 hours. No lab exists for component: INREXT, INREXT    Lipid Panel @BRIEFLAB(CHOL,CHOLPOCT,109114,148466,[id number],CHOLX,CHOLP,CHLST,CHOLV,347587,HDL,HDLPOC,HDLPOCT,124764,NHDLCT,[id number],HDLC,HDLP,LDL,LDLPOCT,LDLCPOC,822834,NLDLCT,DLDL,LDLC,DLDLP,254501,VLDLC,VLDL,TGL,TGLX,TRIGL,[id number],TRIGP,TGLPOCT,778706,980300,CHHD,CHHDX)@   BNP No results for input(s): BNPP in the last 72 hours. Liver Enzymes Recent Labs      07/24/17   0540   TP  6.8   ALB  1.4*   AP  120   SGOT  24      Thyroid Studies Lab Results   Component Value Date/Time    TSH 1.340 06/28/2017 10:30 AM            Discharge Exam:  Visit Vitals    /54 (BP 1 Location: Right arm, BP Patient Position: At rest)    Pulse 89    Temp 98 °F (36.7 °C)    Resp 19    Wt 46.8 kg (103 lb 1.6 oz)    SpO2 98%    BMI 43.12 kg/m2       General:                 Lethargic, opens eyes briefly with tactile stimulation  Lungs:                    CTA bilaterally. Resp even and nonlabored  Heart:                       S1S2 present without murmurs rubs gallops. RRR. Abdomen:              Soft, grimacing with palpation of abd all quadrants, BS present. :                           Significant penile edema with crepitus, purulent drainage. Extremities:           Bilateral AKA  Neurologic:           Lethargic, opens eyes briefly to tactile stimulation      Disposition: Hospice House  Discharge Condition: stable  Patient Instructions:   Current Discharge Medication List      CONTINUE these medications which have NOT CHANGED    Details   carvedilol (COREG) 6.25 mg tablet Take 1 Tab by mouth two (2) times daily (with meals). Qty: 60 Tab, Refills: 1      furosemide (LASIX) 40 mg tablet Take 1 Tab by mouth daily. Qty: 30 Tab, Refills: 1      lisinopril (PRINIVIL, ZESTRIL) 10 mg tablet Take 1 Tab by mouth daily. Qty: 30 Tab, Refills: 0      nicotine (NICODERM CQ) 14 mg/24 hr patch 1 Patch by TransDERmal route daily for 30 days.   Qty: 24 Patch, Refills: 0      spironolactone (ALDACTONE) 25 mg tablet Take 1 Tab by mouth daily. Qty: 30 Tab, Refills: 1      HYDROcodone-acetaminophen (NORCO) 5-325 mg per tablet Take 1 Tab by mouth every eight (8) hours as needed for Pain. Max Daily Amount: 3 Tabs. Qty: 15 Tab, Refills: 0      polyethylene glycol (MIRALAX) 17 gram packet Take 1 Packet by mouth daily as needed. Qty: 30 Each, Refills: 0      senna-docusate (PERICOLACE) 8.6-50 mg per tablet Take 2 Tabs by mouth daily. Qty: 30 Tab, Refills: 0      lacosamide (VIMPAT) 100 mg tab tablet Take 100 mg by mouth two (2) times a day. levETIRAcetam (KEPPRA) 500 mg tablet Take 1 Tab by mouth two (2) times a day. Qty: 60 Tab, Refills: 3      gabapentin (NEURONTIN) 300 mg capsule Take 2 Caps by mouth three (3) times daily. Qty: 180 Cap, Refills: 3      ranitidine (ZANTAC) 150 mg tablet Take 150 mg by mouth two (2) times a day.          STOP taking these medications       levoFLOXacin (LEVAQUIN) 750 mg tablet Comments:   Reason for Stopping:         insulin glargine (LANTUS) 100 unit/mL injection Comments:   Reason for Stopping:         atorvastatin (LIPITOR) 80 mg tablet Comments:   Reason for Stopping:         aspirin delayed-release 81 mg tablet Comments:   Reason for Stopping:               Activity: Bedrest  Diet: Comfort feeding  Wound Care: Keep wound clean and dry      DNR   Surrogate decision maker: wife  Pneumonia and flu vaccine to be administered at discharge per hospital protocol     Follow-up  ·   DC to [facility]      Time spent to discharge patient 45 min  Signed:  Raman Whelan MD  7/25/2017  4:22 PM

## 2017-07-25 NOTE — HOSPICE
Hospice presentation to family and patient at bedside. Family on board with hospice care and philosophy given patient's poor prognosis and inability to tolerate surgery. Case discussed with Dr. Moiz Romo and patient accepted GIP at [facility] and may transport at 2000hrs tonight to room 122.  Thank you for this referral.  Maryana Hines, RN  [facility] Nurse Liaison  [facility]  [phone]

## 2017-07-25 NOTE — PROGRESS NOTES
Problem: Interdisciplinary Rounds  Goal: Interdisciplinary Rounds  Outcome: Progressing Towards Goal  Interdisciplinary team rounds were held 7/25/2017 with the following team members:Care Management, Occupational Therapy, Physician, Speech Therapy and . Plan of care discussed. See clinical pathway and/or care plan for interventions and desired outcomes.

## 2017-07-25 NOTE — HOSPICE
[facility]-    Will meet with wife shortly.     Thank you-  Angy Feliz RN  [facility] liaison  [phone]

## 2017-07-25 NOTE — PROGRESS NOTES
TRANSFER - OUT REPORT:    Verbal report given to Tammie(name) on Elpidio Friday  being transferred to [facility](unit) for routine progression of care       Report consisted of patients Situation, Background, Assessment and   Recommendations(SBAR). Information from the following report(s) SBAR, Kardex, MAR and Accordion was reviewed with the receiving nurse. Lines:   Peripheral IV 07/24/17 Left Arm (Active)   Site Assessment Clean, dry, & intact 7/25/2017  8:34 AM   Phlebitis Assessment 0 7/25/2017  8:34 AM   Infiltration Assessment 0 7/25/2017  8:34 AM   Dressing Status Clean, dry, & intact 7/25/2017  8:34 AM   Dressing Type Tape;Transparent 7/25/2017  8:34 AM   Hub Color/Line Status Infusing 7/25/2017  8:34 AM   Action Taken Open ports on tubing capped 7/24/2017  7:19 PM   Alcohol Cap Used No 7/25/2017  8:34 AM        Opportunity for questions and clarification was provided.       Patient transported with:   [employer]

## 2017-07-26 NOTE — ROUTINE PROCESS
Pt arrived by ambulance to room 122 at . Pt I'd by name and . Pt calm. No distress. No facial grimace. Pt denies pain at this time. Flacc =0-1. Resp non labored on 3L n/c. EMS stated they had to place O2 to get good readings. No sob noted at this time. Lungs diminished. BS diminished. HR reg. Edema 3+ in groin area noted at this time. Brief changed. Pt has stage 2 1x1 on left upper buttocks area. Barrier cream applied to Groin area and stage 2 area. Pt is a BAKA. No open wounds noted on stumps. Pt unable to answer questions. Family arrived at bedside and answered questions. They are going Thursday to make  arrangements. They have not selected at [facility] at this time. SR up x2. Bed low/locked. Call light with in reach. Door opened.

## 2017-07-26 NOTE — ROUTINE PROCESS
Pt arouses easily. C/o groin pain. Unable to rate pain at this time. Flacc =3. Routine meds given po along with dilaudid 2mg po. Peripheral line flushed with 3ml ns. Flushed well. No s/sx of infection noted at site. Dressing clean/dry/intact. Resp non labored on 3L n/c. SR up x2. Bed low/locked. Call light with in reach. Door opened.

## 2017-07-26 NOTE — PROGRESS NOTES
Shift summary- Pt is alert and oriented. PO intake approx 75% of meals. Oxygen on via nc at 3l/min. Penis is swollen and discolored. Calmoseptine cream applied with gauze. Pt has had large BM that is continuing to ooze from rectum. Rectal sphincter does not appear to close. IV access in left forearm flushes well with no s/s of irritation or infiltration. Pt has required Dilaudid x 2 PO PRN and IV x 1 for pain. Contact precautions maintained. Bed low and SR up with tab alerts on. Wife has been at bedside most of the day.

## 2017-07-26 NOTE — PROGRESS NOTES
Report received. Pt round. Pt identified by name. In bed with eyes closed. No s/s of pain, agitation or dyspnea. Bed low and SR up with tab alerts on.

## 2017-07-26 NOTE — ROUTINE PROCESS
Pt sleeping. Calm. No distress. No facial grimace. Flacc =0-1. Resp non labored on 3L n/c. SR up x2. Bed low/locked. Call light with in reach. Tab alerts on. Door opened. Report given to Carie Brooks RN.

## 2017-07-26 NOTE — H&P
History and Physical    Patient: Allie Foy MRN: [id number]  SSN: [id number]    YOB: 1955  Age: 58 y.o. Sex: male      Subjective:      Allie Foy is a 58 y.o. 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 The patient is s/p bilateral AKA for severe PVD with gangrene. Additionally, the patient has severe ischemic cardiomyopathy s/p CABG 2008 with severe left ventricular systolic dysfunction on ECHO. Given the extent of his medical problems and the intensive surgical intervention that would be required to manage his condition, would be too high risk to perform. As such, the family understands and accepts this information and instead elects comfort care measures only to be provided at [facility]. Therefore, he was admited GIP for dx of Zelalem's gangrene for management of pain, wound care, agitation, and family/pt support. Today he is accompanied by his wife of 35 years. Past Medical History:   Diagnosis Date    Alcohol abuse, in remission 6/13/2017    Arthritis     CAD (coronary artery disease) 2008    MI 2006 with stent, stent 2008    Chronic hyponatremia 3/3/2014    Chronic pain     generalized    Dyslipidemia     GERD (gastroesophageal reflux disease)     Hypercholesterolemia     Hypertension     under control with med    Insulin dependent diabetes mellitus ([address] type 2 since 2005    insulin reliant.  bs: \"120'S\" BUT RUNS HIGH    Medical non-compliance 6/25/2012    MI (myocardial infarction) ([address]  2008    EF 55%    Other postprocedural status 1/2/2014 07/03/2013: S/P Right common femoral endarterectomy     PAD (peripheral artery disease) ([address] 10/5/2009    10/24/12 Graft thrombectomy through leg incision of left iliac  to popliteal bypass and left popliteal to posterior tibial bypass -Dr. Wahl Severe  10/15/09 Left external Iliac artery to above the knee popliteal artery bypass graft propaten PTFE, stenting L EIA- Dr. Wahl Severe 10/5/09 Open amputation left 2nd, 3rd toes- Dr. Chowdhury Bun 11/6/08 Left common femoral, superficial femoral and profunda femoris art    PUD (peptic ulcer disease)     PVD (peripheral vascular disease) [facility]     Seizures ([facility]     x1 about 2010?  Sepsis [facility] 2/26/2014    Thrombosis of Left External Iliac to above knee popliteal artery bypass graft 9/23/2010    Tobacco abuse 10/5/2009     Past Surgical History:   Procedure Laterality Date    AMPUTATION TOE,I-P JT  2008    3 toes on left foot    BYPASS GRAFT OTHR,ILIOFEMORAL  2009    left     CARDIAC SURG PROCEDURE UNLIST  2009    2-3 stents    HX ABOVE KNEE AMPUTATION Right 2014    HX AMPUTATION Left 11/14/2012    Left leg above the knee amputation     HX FEMORAL BYPASS  8/29/12    OCCLUDED GRAFT    HX THROMBECTOMY  7/02/12    Graft thrombectomy, left common iliac artery angioplasty and stent, left popliteal artery balloon angioplasty    HX THROMBECTOMY  10/22/2012    Graft thrombectomy through leg incision of left iliac to popliteal bypass and left popliteal to posterior tibial bypass    VASCULAR SURGERY PROCEDURE UNLIST  07/03/2013    R common femoral endarterectomy, re-do groin dissection    VASCULAR SURGERY PROCEDURE UNLIST  06/13/2011    R common femoral endarterectomy, left common and external iliac artery balloon angioplasty,  selective catheterization of left external iliac artery from right common femoral artery    VASCULAR SURGERY PROCEDURE UNLIST  06/25/2012    L Femoral-popliteal graft thrombectomy      Family History   Problem Relation Age of Onset    Stroke Mother     Hypertension Mother     Diabetes Other      Social History   Substance Use Topics    Smoking status: Current Every Day Smoker     Packs/day: 0.25     Years: 46.00    Smokeless tobacco: Never Used    Alcohol use 2.0 oz/week     4 Standard drinks or equivalent per week      Prior to Admission medications    Medication Sig Start Date End Date Taking?  Authorizing Provider   carvedilol (COREG) 6.25 mg tablet Take 1 Tab by mouth two (2) times daily (with meals). 7/2/17   Dunia Rodriguez MD   furosemide (LASIX) 40 mg tablet Take 1 Tab by mouth daily. 7/2/17   Dunia Rodriguez MD   lisinopril (PRINIVIL, ZESTRIL) 10 mg tablet Take 1 Tab by mouth daily. 7/2/17   Dunia Rodriguez MD   nicotine (NICODERM CQ) 14 mg/24 hr patch 1 Patch by TransDERmal route daily for 30 days. 7/2/17 8/1/17  Dunia Rodriguez MD   spironolactone (ALDACTONE) 25 mg tablet Take 1 Tab by mouth daily. 7/2/17   Dunia Rodriguez MD   HYDROcodone-acetaminophen (NORCO) 5-325 mg per tablet Take 1 Tab by mouth every eight (8) hours as needed for Pain. Max Daily Amount: 3 Tabs. 7/2/17   Vineet Huertas,    polyethylene glycol (MIRALAX) 17 gram packet Take 1 Packet by mouth daily as needed. 6/16/17   Philip Duke MD   senna-docusate (PERICOLACE) 8.6-50 mg per tablet Take 2 Tabs by mouth daily. 6/16/17   Philip Duke MD   lacosamide (VIMPAT) 100 mg tab tablet Take 100 mg by mouth two (2) times a day. Historical Provider   levETIRAcetam (KEPPRA) 500 mg tablet Take 1 Tab by mouth two (2) times a day. 1/30/16   Rylee Joaquin MD   gabapentin (NEURONTIN) 300 mg capsule Take 2 Caps by mouth three (3) times daily. 11/27/12   ARNULFO Tony   ranitidine (ZANTAC) 150 mg tablet Take 150 mg by mouth two (2) times a day. Historical Provider        Allergies   Allergen Reactions    Contrast Agent [Iodine] Hives     IVP DYE/Contrast, pt medicated with prednisone 50mg x 3 and still had hives, itching. DID WELL THE NEXT TIME WITHOUT ANY SYMPTOMS    Restoril [Temazepam] Hives       Review of Systems:  A comprehensive review of systems was negative except for: pain to scrotum, abdomen, penis.     Objective:     Vitals:    07/25/17 2040 07/26/17 0514   BP: 125/67 103/57   Pulse: 96 80   Temp: 97.9 °F (36.6 °C) 96.9 °F (36.1 °C)        Physical Exam:  GENERAL: cooperative, mild distress, appears older than stated age  LUNG: clear to auscultation bilaterally  HEART: regular rate and rhythm, S1, S2 normal, no murmur, click, rub or gallop  ABDOMEN: soft, non-tender. Bowel sounds normal. No masses,  no organomegaly. Lower abdominal distention likely related to extension of infection. EXTREMITIES: No edema. Bilateral AKA healed. SKIN: Normal. and no rash or abnormalities except for penis/scrotum with edema and erythema. Skin appears intact but extremely tender to touch. NEUROLOGIC: Lethargic. Oriented to name. Bed bound. Cooperative with exam but does not participate in exam nor answer my questions except to nod yes when I ask about pain. PSYCHIATRIC: non focal    Assessment:     Hospital Problems  Date Reviewed: 7/25/2017          Codes Class Noted POA    * (Principal)Zelalem's gangrene of penis ICD-10-CM: N49.3  ICD-9-CM: 608.83  7/25/2017 Yes        S/P AKA (above knee amputation) bilateral ([facility] (Chronic) ICD-10-CM: B92.304, M35.684  ICD-9-CM: V49.76  12/5/2014 Yes    Overview Signed 12/5/2014  3:54 PM by Archie Gustafson     Due to PVD, diabetes etc             Sepsis ([facility] (Chronic) ICD-10-CM: A41.9  ICD-9-CM: 038.9, 995.91  2/26/2014 Yes        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 Andrey Thakkar) [address] KNEE(AKA)/ RIGHT LEG   11/14/12 (Dr. Andrey Thakkar) Left leg above the knee amputation.                ETOH abuse (Chronic) ICD-10-CM: F10.10  ICD-9-CM: 305.00  6/10/2011 Yes    Overview Signed 6/10/2011  6:38 PM by ARNULFO Jon     12 pk of beer/ day for \"years\" until a month and have ago             Tobacco abuse (Chronic) ICD-10-CM: Z72.0  ICD-9-CM: 305.1  10/5/2009 Yes    Overview Signed 6/13/2017  5:28 PM by Ml Salinas NP     QUIT                    Plan:     Current Facility-Administered Medications   Medication Dose Route Frequency    nicotine (NICODERM CQ) 14 mg/24 hr patch 1 Patch  1 Patch TransDERmal DAILY    sodium chloride (NS) flush 3 mL  3 mL IntraVENous Q12H    sodium chloride (NS) flush 3 mL  3 mL IntraVENous PRN    HYDROmorphone (DILAUDID) syringe 0.5 mg  0.5 mg SubCUTAneous Q30MIN PRN    Or    HYDROmorphone (DILAUDID) syringe 0.5 mg  0.5 mg IntraVENous Q30MIN PRN    diphenhydrAMINE (BENADRYL) capsule 25 mg  25 mg Oral Q6H PRN    diphenhydrAMINE (BENADRYL) injection 25 mg  25 mg IntraVENous Q6H PRN    acetaminophen (TYLENOL) suppository 650 mg  650 mg Rectal Q3H PRN    acetaminophen (TYLENOL) tablet 650 mg  650 mg Oral Q4H PRN    senna (SENOKOT) tablet 8.6 mg  1 Tab Oral BID    bisacodyl (DULCOLAX) suppository 10 mg  10 mg Rectal PRN    haloperidol lactate (HALDOL) injection 2 mg  2 mg SubCUTAneous Q1H PRN    Or    haloperidol lactate (HALDOL) injection 2 mg  2 mg IntraVENous Q1H PRN    haloperidol (HALDOL) tablet 2 mg  2 mg Oral Q1H PRN    glycopyrrolate (ROBINUL) injection 0.2 mg  0.2 mg SubCUTAneous Q4H PRN    LORazepam (ATIVAN) injection 2 mg  2 mg IntraVENous Q10MIN PRN    levETIRAcetam (KEPPRA) tablet 500 mg  500 mg Oral BID    HYDROmorphone (DILAUDID) tablet 2 mg  2 mg Oral Q2H PRN     1. Admited GIP for dx of Zelalem's gangrene for management of pain, wound care, agitation, and family/pt support. Placed on contact precautions for ESBL blood. 2. Pain: PRN dilaudid and monitor need for long acting medication. Able to consume PO at this time but have SQ/IV available as needed. 3. Wound care: Elevate penis/scrotum and maintain comfort by keeping dry. He is able to urinate and is incontinent so barrier cream applied to protect skin and then wrapped in guaze and brief. 4. Agitation: PRN haldol and ativan. 5. Family/pt support: Spoke with wife at bedside and discussed plan of care including meds.  We discuss that he will continue PO meds and in particular his Keppra but that if/when he is unable to take them by mouth that we will provide SQ/IV meds instead. His wife is quite distressed over his clinical condition but reports that she is happy that his suffering will be over soon. She is tearful but is consoled by the fact that he is here with us and my reassurance that we will keep him comfortable. Plan of care including meds discussed with primary RN. Continue to monitor and palliate symptoms as they arise. PPS 20%.      Signed By: Betty Mayberry NP     July 26, 2017

## 2017-07-26 NOTE — PROGRESS NOTES
CM reviewed record. Patient has admitted to [facility] hospice [place], as a transition plan from the inpatient acute team.    At this time, no further outreaches will be made. Patient does have family present. This note will not be viewable in [address]

## 2017-07-27 NOTE — PROGRESS NOTES
Received report from off-going RN. Pt ID at bedside with no s/sx of pain or distress noted at this time, family at bedside.

## 2017-07-27 NOTE — PROGRESS NOTES
Medicated with Benadryl 25 mg IV for itching and Hydromorphone 0.5 mg for pain of 9. Repositioned for comfort.

## 2017-07-27 NOTE — PROGRESS NOTES
Pt reports severe pain 10/10 after brief change; pt had BM, soft formed stool; reapplied dressing to penis area; administered prn hydromorphone IV

## 2017-07-27 NOTE — PROGRESS NOTES
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 Linh Liriano) [address] KNEE(AKA)/ RIGHT LEG       12 (Dr. Linh Liriano) Left leg above the knee amputation. Sepsis ([facility] (2014) POA: Yes      S/P AKA (above knee amputation) bilateral ([facility] (2014) POA: Yes      Overview: Due to PVD, diabetes etc        Individuals participating in interview//assessment process (name and relationship): [address] [place] [address]  If in a relationship, name of partner/spouse? Alexia Gooden  Duration of relationship: 35 years  Does the spouse/partner function as the primary caregiver? YES     Members of the household  Does the patient live alone: NO  Members of the household  Name Age Relationship Actively Involved in [address] [place] [address] yes                             Notes    Family members/significant others not part of the household  Name Age Relationship Actively Involved in Care                                   Notes They have 3 children 1  in [address] [place] [address] Avenue: Good social support system which includes two or less willing family members or friends    Abuse/Neglect (actual/potential risks): none    Mental Status: Alert; Oriented to  Person and Place  Does the patient or family have any concerns about the patient's mental status?  NO    Emotional Status  Patient: mood/affect stable and appropriate  Caregiver: mood/affect stable and appropriate and tearful    Significant Behavioral Changes Noted with members of the household other than the patient: none observed    Leisure time management/hobbies/interests: watching television    Financial/Employment Status  Current employment status disability   Has the patient's illness/condition forced a change in his/her employment status? NO    Has the patient's illness/condition forced a change in the employment status of the spouse or significant other? NO    Patient's annual income level:   Has the patient's income status changed as a result of health status: NO  Financial needs: YES  The patient needs the following services: Community resources  Handling finances: Total assistance unable to manage his/her own financial affairs  Financial concerns expressed by patient or spouse/significant other: spouse can not afford to eat while she is here. 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 She is currently on vacation. Goals/Plans  Goals of hospice care expressed by patient: none  Goals of hospice care expressed by spouse/significant other: comfort for spouse    Pain Management  Does the patient/family express or observed signs/symptoms of any pain/issues that need to be reported to the ? YES  If yes, what time was the  notified?  was in the room at the time of pain     End of Life Decisions/Planning:   Status of plans for /final arrangements: Plans in progress  Plans/desires/requests for final arrangements/ communicated with: Family and  home  End of life notes: Pt is a DNR family is deciding between [facility] and [facility].     Remaining life goals:     Survivor Risk Assessment  Indicate the actual and potential risks to the bereavement process: financial status significantly affected by illness/death  Risk notes: Low risk normal grief response expected     Is spiritual well being essential to patient/caregiver? Asked and discussion occurred   Spiritual notes: They attend [employer]. .    Narrative :  LMSW and megan Petersen talk to the spouse in the family room. Pt was seeing Dr Jun Whaley and the spouse was tearful so we took her to the family room. Joel Nichole and Danuta Benitez have  8 years but have been together 28 years. She said she has been caring for him for 7 of the 8. They had 4 children 1 is . He has many siblings in [place]. They attend [employer] and she has called but her  is on vacation at this time,  They will be back Saturday. The spouse Danuta Benitez has been working and trying to care for him. Danuta Benitez is a manager at [employer] shift. She is on vacation this week. She was going to take him to see his sisters in [place] for vacation, but he came to the hospice house. Danuta Benitez was give 2 food vouchers this morning. She is unable to spend money to eat here at the house. [facility] is currently deciding [facility]. Danuta Benitez is working with his sister in [place].       Mariela Hussein

## 2017-07-28 NOTE — PROGRESS NOTES
Progress Note    Patient: Jessica Michelle MRN: [id number]  SSN: [id number]    YOB: 1955  Age: 58 y.o. Sex: male      Admit Date: 7/25/2017    LOS: 3 days     Subjective:     Unresponsive. NPO. Has required Dilaudid x 6 IV for pain, Haldol x 3 IV for agitation and Glycopyrrolate x 2 for secretions. Family at bedside. Review of Systems:  Review of systems not obtained due to patient factors. Objective:     Vitals:    07/26/17 1926 07/27/17 0559 07/27/17 2107 07/28/17 0620   BP: 130/68 128/65 121/68 122/60   Pulse: 94 86 (!) 101 (!) 102   Resp:   21 20   Temp: 95.6 °F (35.3 °C) 96.4 °F (35.8 °C) 99.1 °F (37.3 °C) 97.3 °F (36.3 °C)        Intake and Output:  Current Shift: 07/28 0701 - 07/28 1900  In: -   Out: 1   Last three shifts: 07/26 1901 - 07/28 0700  In: -   Out: 3 [Urine:2]    Physical Exam:   GENERAL: no distress, appears older than stated age, unresponsive  LUNG: clear to auscultation bilaterally, diminished breath sounds   HEART: regular rate and rhythm, S1, S2 normal, no murmur, click, rub or gallop  ABDOMEN: soft, non-tender. Bowel sounds hypoactive. No masses,  no organomegaly. Lower abdominal distention, related to gangrene in the genital area. : Incontinent. Severe edema of the penis. Moderate scrotal edema. EXTREMITIES:  extremities normal, atraumatic, no cyanosis or edema. Weak pulses. SKIN: Gangrene of the penis. Severe edema to penis. Skin intact. Cool to touch on ext. NEUROLOGIC: Unresponsive. Bedbound. Unable to participate in exam.     Lab/Data Review:  No new labs resulted in the last 24 hours. 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 Juan David Lazo) AMPUTATION ABOVE THE KNEE(AKA)/ RIGHT LEG/ Left leg above the knee amputation.             [facility] (2/26/2014)      S/P AKA (above knee amputation) bilateral ([facility] (12/5/2014)            Plan:     Current Facility-Administered Medications   Medication Dose Route Frequency    PHENobarbital (LUMINAL) injection 65 mg  65 mg SubCUTAneous Q12H    fentaNYL (DURAGESIC) 75 mcg/hr patch 1 Patch  1 Patch TransDERmal Q72H    HYDROmorphone (DILAUDID) syringe 1 mg  1 mg SubCUTAneous Q30MIN PRN    Or    HYDROmorphone (DILAUDID) syringe 1 mg  1 mg IntraVENous Q30MIN PRN    nicotine (NICODERM CQ) 14 mg/24 hr patch 1 Patch  1 Patch TransDERmal DAILY    sodium chloride (NS) flush 3 mL  3 mL IntraVENous Q12H    sodium chloride (NS) flush 3 mL  3 mL IntraVENous PRN    diphenhydrAMINE (BENADRYL) injection 25 mg  25 mg IntraVENous Q6H PRN    acetaminophen (TYLENOL) suppository 650 mg  650 mg Rectal Q3H PRN    bisacodyl (DULCOLAX) suppository 10 mg  10 mg Rectal PRN    haloperidol lactate (HALDOL) injection 2 mg  2 mg SubCUTAneous Q1H PRN    Or    haloperidol lactate (HALDOL) injection 2 mg  2 mg IntraVENous Q1H PRN    glycopyrrolate (ROBINUL) injection 0.2 mg  0.2 mg SubCUTAneous Q4H PRN    LORazepam (ATIVAN) injection 2 mg  2 mg IntraVENous Q10MIN PRN     Admitted GIP with Zelalem's gangrene for management of pain, agitation, dyspnea and wound care. 1. Pain: Hydromorphone as ordered. Fentanyl patch as ordered. 2. Agitation: Haloperidol and Lorazepam as ordered. 3. Dyspnea: Hydromorphone as ordered. Glycopyrrolate prn secretions. Oxygen prn.    4. Wound Care: Turn and reposition Q2 hours and prn. Wound care as ordered. 5. Family/Pt Support: Family at bedside during exam. Medications and plan of care discussed with nursing staff and family. Will continue to monitor for symptoms and adjust medications as needed to maintain patient comfort. PPS 10%. Case discussed with Dr. Fatmata Quiros and in [facility] meeting today. Changes made 7/27: Added Fentanyl 75mcg. Increased Dilaudid IV to 1mg. DC po meds.  Changed to pleasure diet due to swallowing difficulties. Added Phenobarbital SQ to replace po Keppra. No changes made 7/28.     Signed By: Alexander Barnett NP     July 28, 2017

## 2017-07-28 NOTE — [facility] IDG NURSE NOTES
Patient: Dillan Emmanuel    Date: 07/28/17  Time: 12:36 PM    [facility] Nurse Notes    UPDATE: Patient is a 58year old Male patient, with diagnosis of Foumier's Gangrene. GIP for Pain, Wound Care, and Agitation. PO meds discontinued, and Dilaudid injectable increased with Fentanyl patch added for pain management. Phenobarb scheduled. Pleasure diet due to swallowing difficulties. GOALS OF CARE:   Continue to monitor for optimal patient comfort and symptom management. Ongoing family support.             Signed by: Nayeli Mcduffie RN MSN

## 2017-07-28 NOTE — PROGRESS NOTES
The shift change rounds completed with the off going RN and report was taken. Patient's wife at the bedside. The patient was identified by name and date of birth. The patient is resting quietly in the bed with no signs of distress observed. Pain =0, no signs of anxiety, SOB or other distress observed at this time. The bed is low and locked and the side rails are up times 2 for safety. Will continue to monitor.

## 2017-07-28 NOTE — [facility] IDG VOLUNTEER NOTES
[address] Review     Status Codes I = Initiated C=Continued R=Revised RS = Resolved     I.  Volunteer     Goal: Hospice house volunteer (s) enhances the quality of remaining life while patient is at the hospice house. Interventions: Kim Barbaing Volunteer (s) will provide companionship to the patient and/or family by visiting at the hospice house       . Kim Riddle Volunteer (s) will provide respite as needed when requested by patient and/or family. Kim Valle  Volunteer will provide activities such as music, reading, pet therapy, etc. as requested. Kim Valle  Comfort bag delivered. Any other special requests or information regarding volunteer services:    Comfort bag delivered. No further needs identified at this time. These notes have been discussed in [facility] meeting.           Signed by: Darrel Mitchell

## 2017-07-28 NOTE — [facility] IDG SOCIAL WORKER NOTES
Patient: Amelie Ross    Date: 07/28/17  Time: 12:37 PM    \[facility]\""  Notes    LMSW will continued to provide emotional support to the pt and family. LMSW provided a 5 meal ticket for her.           Signed by: Siddhartha Gan

## 2017-07-28 NOTE — PROGRESS NOTES
Initial Spiritual Assessment:    Situation:  Patient sitting up in bed. Dr. Reji Taylor in room with patient. Support provided to patient's spouse in Family Room in joint initial visit made with , [facility]. Background:  Patient is 58year old [place] with dx of Zelalem's gangrene of penis, admitted on 7/25/17. Annmarie background is [employer].    Assess/Response:  Spiritual/emotional support provided to patient's spouse, Carolyn Bauer, through [employer] of presence, providing opportunity for life review, and prayer. Patient's spouse is receptive to spiritual care, and reports support from local community of [place].  has been out of town but is expected back soon. Primary concern and struggle for patient's spouse seems to be patient's suffering.  explained spiritual care and bereavement care/support available to patient and family. Plan of care:  Spiritual/emotional support to be provided as needed, requested, or referred.

## 2017-07-29 NOTE — PROGRESS NOTES
Received report from off going RN and assumed care of patient. Patient identified by name and . No s/sx of pain or agitation at this time. Bed low and locked. Call light within reach . Door open for monitoring.

## 2017-07-29 NOTE — PROGRESS NOTES
Shift summary:  I verify that a minimum of hourly rounds have been provided for this patient during this shift. Meds: Medicated for pain PRN. Family/Education:  No questions from spouse at this time. Oral intake: None    Intake/Output Summary (Last 24 hours) at 07/29/17 0432  Last data filed at 07/28/17 0984   Gross per 24 hour   Intake                0 ml   Output                1 ml   Net               -1 ml     regular, soft BM. New problems/issues:  Pt could benefit from pre-medicating prior to repositioning. Summary/general care: Pt rested well this shift. Wife remained at the bedside. Linens provided, showed her how to pull out the chair. Report given to:  Airam Pierson

## 2017-07-30 NOTE — PROGRESS NOTES
Shift summary- Patient has required Dilaudid x 1 this shift for pain. 0 BM. Patient rested well this shift. Bed low and locked. Call light within reach. Wife at bed side.

## 2017-07-30 NOTE — PROGRESS NOTES
Progress Note    Patient: Sultana Luis MRN: [id number]  SSN: [id number]    YOB: 1955  Age: 58 y.o. Sex: male      Admit Date: 7/25/2017    LOS: 4 days     Subjective:     Unresponsive. NPO. Has required Dilaudid x 4 IV for pain, Haldol x 2 IV for agitation. Family at bedside. Review of Systems:  Review of systems not obtained due to patient factors. Objective:     Vitals:    07/28/17 0620 07/28/17 1720 07/29/17 0515 07/29/17 1645   BP: 122/60 105/50 (!) 77/53 90/53   Pulse: (!) 102 96 100 91   Resp: 20 18 16 17   Temp: 97.3 °F (36.3 °C) 99.8 °F (37.7 °C) 96.1 °F (35.6 °C) 96.6 °F (35.9 °C)        Intake and Output:  Current Shift:    Last three shifts: 07/28 0701 - 07/29 1900  In: -   Out: 1     Physical Exam:   GENERAL: no distress, appears older than stated age, unresponsive  LUNG: clear to auscultation bilaterally, diminished breath sounds   HEART: regular rate and rhythm, S1, S2 normal, no murmur, click, rub or gallop  ABDOMEN: soft, non-tender. Bowel sounds hypoactive. No masses,  no organomegaly. Lower abdominal distention, related to gangrene in the genital area. : Incontinent. Severe edema of the penis. Moderate scrotal edema. EXTREMITIES:  extremities normal, atraumatic, no cyanosis or edema. Weak pulses. SKIN: Gangrene of the penis. Severe edema to penis. Skin intact. Cool to touch on ext. NEUROLOGIC: Unresponsive. Bedbound. Unable to participate in exam.        Lab/Data Review:  No new labs resulted in the last 24 hours.     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 Corazon Wood) AMPUTATION ABOVE THE KNEE(AKA)/ RIGHT LEG/ Left leg above the knee amputation.           Sepsis ([facility] (2/26/2014)       S/P AKA (above knee amputation) bilateral ([facility] (12/5/2014)        Plan:     Current Facility-Administered Medications   Medication Dose Route Frequency    HYDROmorphone (PF) (DILAUDID) injection 1 mg  1 mg IntraVENous Q30MIN PRN    Or    HYDROmorphone (PF) (DILAUDID) injection 1 mg  1 mg SubCUTAneous Q30MIN PRN    PHENobarbital (LUMINAL) injection 65 mg  65 mg SubCUTAneous Q12H    fentaNYL (DURAGESIC) 75 mcg/hr patch 1 Patch  1 Patch TransDERmal Q72H    sodium chloride (NS) flush 3 mL  3 mL IntraVENous Q12H    sodium chloride (NS) flush 3 mL  3 mL IntraVENous PRN    diphenhydrAMINE (BENADRYL) injection 25 mg  25 mg IntraVENous Q6H PRN    acetaminophen (TYLENOL) suppository 650 mg  650 mg Rectal Q3H PRN    bisacodyl (DULCOLAX) suppository 10 mg  10 mg Rectal PRN    haloperidol lactate (HALDOL) injection 2 mg  2 mg SubCUTAneous Q1H PRN    Or    haloperidol lactate (HALDOL) injection 2 mg  2 mg IntraVENous Q1H PRN    glycopyrrolate (ROBINUL) injection 0.2 mg  0.2 mg SubCUTAneous Q4H PRN    LORazepam (ATIVAN) injection 2 mg  2 mg IntraVENous Q10MIN PRN       Admitted GIP with Zelalem's gangrene for management of pain, agitation, dyspnea and wound care.      1. Pain: Hydromorphone as ordered. Fentanyl patch as ordered.      2. Agitation: Haloperidol and Lorazepam as ordered.      3. Dyspnea: Hydromorphone as ordered. Glycopyrrolate prn secretions. Oxygen prn.     4. Wound Care: Turn and reposition Q2 hours and prn. Wound care as ordered.      5. Family/Pt Support: Family at bedside during exam. Medications and plan of care discussed with nursing staff and family. Will continue to monitor for symptoms and adjust medications as needed to maintain patient comfort. PPS 10%. Case discussed with Dr. Inga Wu. No change in plan of care. Patient demise likely in the next 24-48 hours.      Signed By: Mercy Matute NP     July 29, 2017

## 2017-07-30 NOTE — PROGRESS NOTES
@[id number] with no apical pulse, no blood pressure, no respirations, and not responsive to verbal or tactile stimuli.  Called wife to inform her, she is one the way

## 2017-07-31 NOTE — HSPC IDG BEREAVEMENT NOTES
Patient death and family bereavement needs discussed at [address] [place] [address]. Bereavement risk factors indicate a bereavement risk score of MODERATE . Bereavement follow up to be provided accordingly.

## 2017-07-31 NOTE — HSPC IDG MASTER NOTE
Hospice Interdisciplinary Group Collaborative  Date: 07/28/2017  Time: 1300 PM    ___________________    Patient: Mildred Cool  Insurance ID: [unfilled]  MRN: [id number]          ___________________    Diagnoses: There were no encounter diagnoses. Current Medications:  No current facility-administered medications for this encounter. Current Outpatient Prescriptions:     carvedilol (COREG) 6.25 mg tablet, Take 1 Tab by mouth two (2) times daily (with meals). , Disp: 60 Tab, Rfl: 1    furosemide (LASIX) 40 mg tablet, Take 1 Tab by mouth daily. , Disp: 30 Tab, Rfl: 1    lisinopril (PRINIVIL, ZESTRIL) 10 mg tablet, Take 1 Tab by mouth daily. , Disp: 30 Tab, Rfl: 0    nicotine (NICODERM CQ) 14 mg/24 hr patch, 1 Patch by TransDERmal route daily for 30 days. , Disp: 24 Patch, Rfl: 0    spironolactone (ALDACTONE) 25 mg tablet, Take 1 Tab by mouth daily. , Disp: 30 Tab, Rfl: 1    HYDROcodone-acetaminophen (NORCO) 5-325 mg per tablet, Take 1 Tab by mouth every eight (8) hours as needed for Pain. Max Daily Amount: 3 Tabs., Disp: 15 Tab, Rfl: 0    polyethylene glycol (MIRALAX) 17 gram packet, Take 1 Packet by mouth daily as needed. , Disp: 30 Each, Rfl: 0    senna-docusate (PERICOLACE) 8.6-50 mg per tablet, Take 2 Tabs by mouth daily. , Disp: 30 Tab, Rfl: 0    lacosamide (VIMPAT) 100 mg tab tablet, Take 100 mg by mouth two (2) times a day., Disp: , Rfl:     levETIRAcetam (KEPPRA) 500 mg tablet, Take 1 Tab by mouth two (2) times a day., Disp: 60 Tab, Rfl: 3    gabapentin (NEURONTIN) 300 mg capsule, Take 2 Caps by mouth three (3) times daily. , Disp: 180 Cap, Rfl: 3    ranitidine (ZANTAC) 150 mg tablet, Take 150 mg by mouth two (2) times a day., Disp: , Rfl:     Orders:  Orders Placed This Encounter    IP CONSULT TO SPIRITUAL CARE     Standing Status:   Standing     Number of Occurrences:   1     Order Specific Question:   Reason for Consult: Answer: Once on week one, then PRN.  For [facility] Patients Only. For contracted patients, their primary hospice will continue to manage spiritual care needs.  OXYGEN CANNULA Liters per minute: 3; Indications for O2 therapy: RESPIRATORY DISTRESS CONTINUOUS Routine     Standing Status:   Standing     Number of Occurrences:   1     Order Specific Question:   Liters per minute: Answer:   3     Order Specific Question:   Indications for O2 therapy     Answer:   RESPIRATORY DISTRESS    DISCONTD: sodium chloride (NS) flush 3 mL    DISCONTD: sodium chloride (NS) flush 3 mL    DISCONTD: HYDROmorphone (DILAUDID) syringe 0.5 mg    DISCONTD: HYDROmorphone (DILAUDID) syringe 0.5 mg    DISCONTD: diphenhydrAMINE (BENADRYL) capsule 25 mg    DISCONTD: diphenhydrAMINE (BENADRYL) injection 25 mg    DISCONTD: acetaminophen (TYLENOL) suppository 650 mg    DISCONTD: acetaminophen (TYLENOL) tablet 650 mg    DISCONTD: senna (SENOKOT) tablet 8.6 mg    DISCONTD: bisacodyl (DULCOLAX) suppository 10 mg    DISCONTD: haloperidol lactate (HALDOL) injection 2 mg    DISCONTD: haloperidol lactate (HALDOL) injection 2 mg    DISCONTD: haloperidol (HALDOL) tablet 2 mg    DISCONTD: glycopyrrolate (ROBINUL) injection 0.2 mg    DISCONTD: LORazepam (ATIVAN) injection 2 mg    DISCONTD: levETIRAcetam (KEPPRA) tablet 500 mg    DISCONTD: HYDROmorphone (DILAUDID) tablet 2 mg    nicotine (NICODERM CQ) 14 mg/24 hr patch 1 Patch    DISCONTD: PHENobarbital (LUMINAL) injection 65 mg    DISCONTD: fentaNYL (DURAGESIC) 75 mcg/hr patch 1 Patch    DISCONTD: HYDROmorphone (DILAUDID) syringe 1 mg    DISCONTD: HYDROmorphone (DILAUDID) syringe 1 mg    DISCONTD: HYDROmorphone (PF) (DILAUDID) injection 1 mg    DISCONTD: HYDROmorphone (PF) (DILAUDID) injection 1 mg    INITIAL PHYSICIAN ORDER: HOSPICE Level Of Care: General; Reason for Admission: Admit GIP for dx of Zelalem's gangrene for management of pain, wound care, and agitation.      Standing Status:   Standing     Number of Occurrences:   1 Order Specific Question:   Status     Answer:   Hospice     Order Specific Question:   Level Of Care     Answer:   General     Order Specific Question:   Reason for Admission     Answer:   Admit GIP for dx of Zelalem's gangrene for management of pain, wound care, and agitation. Order Specific Question:   Inpatient Hospitalization Certified Necessary for the Following Reasons     Answer:   3. Patient receiving treatment that can only be provided in an inpatient setting (further clarification in H&P documentation)     Order Specific Question:   Admitting Diagnosis     Answer:   Zelalem's gangrene of penis [[id number]]     Order Specific Question:   Terminal Prognosis Diagnosis(es)     Answer:   PVD (peripheral vascular disease) ([facility] [[id number]]     Order Specific Question:   Terminal Prognosis Diagnosis(es)     Answer:   S/P AKA (above knee amputation) bilateral [facility]) [[id number]]     Order Specific Question:   Terminal Prognosis Diagnosis(es)     Answer:   Renal failure [[id number]]     Order Specific Question:   Terminal Prognosis Diagnosis(es)     Answer:   Diabetes [facility]) [[id number]]     Order Specific Question:   Admitting Physician     Answer:   Clari Chain     Order Specific Question:   Attending Physician     Answer:   Clari Chain     Order Specific Question:   Discharge Plan:     Answer: Other (Specify)     Comments:   Anticipate demise at [facility]       Allergies: Allergies   Allergen Reactions    Contrast Agent [Iodine] Hives     IVP DYE/Contrast, pt medicated with prednisone 50mg x 3 and still had hives, itching.     DID WELL THE NEXT TIME WITHOUT ANY SYMPTOMS    Restoril [Temazepam] Hives       ___________________    Care Team Notes          POC/IDG Notes      HSPC IDG Volunteer Notes by Rehan Del Cid at 07/28/17 1448  Version 1 of 1    Author:  Rehan Del Cid Service:  Guillaume Smith Author Type:  Hospice Volunteer/    Filed:  07/28/17 8208 Date of Service:  07/28/17 1448 Status:  Signed    :  Scott Ramirez (Hospice Volunteer/)               [facility] Interdisciplinary Plan of Care Review     Status Codes I = Initiated C=Continued R=Revised RS = Resolved     I.  Volunteer     Goal: Hospice house volunteer (s) enhances the quality of remaining life while patient is at the hospice house. Interventions: Maynor Gleason Volunteer (s) will provide companionship to the patient and/or family by visiting at the hospice house       . Maynor Gleason Volunteer (s) will provide respite as needed when requested by patient and/or family. Maynor Olivares  Volunteer will provide activities such as music, reading, pet therapy, etc. as requested. Maynor Olivares  Comfort bag delivered. Any other special requests or information regarding volunteer services:    Comfort bag delivered. No further needs identified at this time. These notes have been discussed in [facility] meeting. Signed by: Scott Ramirez       [facility] IDG  Notes by Amelie Velazquez at 07/28/17 1257  Version 1 of 1    Author:  Amelie Velazquez Service:  Azar Roland Author Type:  Pastoral Care    Filed:  07/28/17 1303 Date of Service:  07/28/17 1257 Status:  Signed    :  Amelie Velazquez (Pastoral Care)           Patient: Fernanda Rubio    Date: 07/28/17  Time: 12:57 PM    [facility]  Notes  Patient is a 58year old patient with Zelalem's gangrene of penis. Yfn Mathew made initial spiritual assessment visit along with MSROSITA Borrego on 7/27. Patient's spouse is somewhat emotional but coping in vanessa with patient's illness and decline. Plan of care is for spiritual care and support to be provided as needed, requested, or referred.         Signed by: Amelie Velazquez       [facility] IDG  Notes by Trent Cuadra at 07/28/17 1237  Version 1 of 1    Author:  Trent Cuadra Service:  Azar Roland Author Type:      Filed:  07/28/17 1242 Date of Service:  07/28/17 1237 Status:  Signed    :  Gato Cornell ()           Patient: Brendon Moran    Date: 07/28/17  Time: 12:37 PM    [facility]  Notes    LMSW will continued to provide emotional support to the pt and family. LMSW provided a 5 meal ticket for her. Signed by: Gato Cornell       [facility] IDG Nurse Notes by Patricia Farrell at 07/28/17 1236  Version 1 of 1    Author:  Patricia Farrell Service:  Ethan King Author Type:  Registered Nurse    Filed:  07/28/17 1240 Date of Service:  07/28/17 1236 Status:  Signed    :  Patricia Farrell (Registered Nurse)           Patient: Brendon Moran    Date: 07/28/17  Time: 12:36 PM    [facility] Nurse Notes    UPDATE: Patient is a 58year old Male patient, with diagnosis of Foumier's Gangrene. GIP for Pain, Wound Care, and Agitation. PO meds discontinued, and Dilaudid injectable increased with Fentanyl patch added for pain management. Phenobarb scheduled. Pleasure diet due to swallowing difficulties. GOALS OF CARE:   Continue to monitor for optimal patient comfort and symptom management. Ongoing family support.             Signed by: Patricia Farrell RN MSN

## 2018-05-22 NOTE — PROGRESS NOTES
C/o intermittent pain over bladder region. Normal urination. C/o abnormal vaginal odor for 1 wk. Denies vaginal discharge.   Patient getting irate at staff, yelling and attempting to hit. He has pulled off his oxygen, pulse ox, gown and was about to pull out central line when staff finally gained control of his hands. He was thrashing and threatening staff with violence. Patient stating he wants to leave saying \"Alisia let me leave when I was there. \" MD notified. Orders received for soft restraints, MD to come see patient.

## 2022-01-31 NOTE — PROGRESS NOTES
[facility] CARDIOLOGY PROGRESS NOTE           7/1/2017 10:37 AM    Admit Date: 6/27/2017      Subjective:   Patient remains in ICU. BP much improved with treatment of sepsis and transfusion of PRBCs. No active chest pain. No dyspnea. On room air. 3.7 liter diuresis on IV lasix. Does not appear volume overloaded. ROS:  Cardiovascular:  As noted above    Objective:      Vitals:    07/01/17 0732 07/01/17 0801 07/01/17 0831 07/01/17 0931   BP: 131/62 114/55 103/53 110/56   Pulse: (!) 102 100 100 98   Resp: 30 22 22 25   Temp: 98.9 °F (37.2 °C)      SpO2: 98% 98% 95% 98%   Weight:       Height:           Physical Exam:  General-No Acute Distress  Neck- supple, mild JVD  CV- regular rate and rhythm Grade II/VI CARLINE at apex. Lung- clear bilaterally  Abd- soft, nontender, nondistended  Ext- Bilateral AKA. Skin- warm and dry      Data Review:   Recent Labs      07/01/17   0430  06/30/17   1730  06/30/17   0445   06/29/17   0355   06/28/17   1415   NA  134*   --   136   --   136   --   133*   K  4.0   --   3.6   --   3.7   < >  5.4*   MG  2.0   --   2.3   --    --    --    --    BUN  31*   --   37*   --   45*   --   56*   CREA  0.80   --   0.96   --   1.01   --   1.19   GLU  241*   --   132*   --   76   --   38*   WBC   --    --    --    --   11.4*   --    --    HGB  9.0*  9.5*  9.3*   < >  8.7*   < >  5.7*   HCT  26.4*  28.1*  27.2*   < >  25.3*   < >  17.1*   PLT   --    --    --    --   223   --    --    INR   --    --    --    --    --    --   1.0    < > = values in this interval not displayed. No results found for: IRIS Driver    Echo (6/28/17):  SUMMARY:  -  Left ventricle: The ventricle was mildly dilated. Systolic function was severely reduced. Ejection fraction was estimated in the range of 30 % to 35 %. There was severe diffuse hypokinesis. -  Right ventricle: The ventricle was mildly dilated. Systolic function was reduced.   -  Left atrium: The atrium was mildly Pt coughing last 2 days, wheezing x 4 months. Rash x 24 hours.  Pt \"hasn't wanted to eat today, mostly sleeping\" dilated. -  Right atrium: The atrium was mildly dilated. -  Inferior vena cava, hepatic veins: The inferior vena cava was dilated. The respirophasic change in diameter was less than 50%. -  Mitral valve: There was moderate regurgitation. -  Tricuspid valve: There was mild to moderate regurgitation. Assessment/Plan:     Principal Problem:    Hypoglycemia (6/27/2017)    Defer management to primary team.      Active Problems:    CAD (coronary artery disease) (6/25/2012)    Low level troponin elevation felt supply/demand imbalance in setting of sepsis and hypotension. Suspect significant CAD with CT showing coronary calcifications and known severe PVDz. No angina and given co-morbities would focus on medical management. May consider outpatient ischmia evaluation as condition stabilizes if patient desires (refuses EGD). On ASA and Coreg. Start statin. Medical non-compliance (6/25/2012)    Noted. Complicates long term management. DM (diabetes mellitus) type II uncontrolled, periph vascular disorder ([facility] (11/14/2012)    Defer management to primary team.          Sepsis ([facility] (2/26/2014)    Continue antibiotics. Chronic systolic heart failure  Change to PO lasix. On coreg and lisinopril.                  Kashmir Bates MD  7/1/2017 10:37 AM

## 2023-10-02 NOTE — HSPC IDG CHAPLAIN NOTES
Patient: Brendon Moran    Date: 07/28/17  Time: 12:57 PM    [facility]  Notes  Patient is a 58year old patient with Zelalem's gangrene of penis. Puja Bennett made initial spiritual assessment visit along with LUIS ARMANDO Borrego on 7/27. Patient's spouse is somewhat emotional but coping in vanessa with patient's illness and decline. Plan of care is for spiritual care and support to be provided as needed, requested, or referred.         Signed by: Zuleika Herrera Alert and oriented to person, place and time